# Patient Record
Sex: MALE | Race: ASIAN | NOT HISPANIC OR LATINO | Employment: OTHER | URBAN - METROPOLITAN AREA
[De-identification: names, ages, dates, MRNs, and addresses within clinical notes are randomized per-mention and may not be internally consistent; named-entity substitution may affect disease eponyms.]

---

## 2019-01-03 ENCOUNTER — OFFICE VISIT - HEALTHEAST (OUTPATIENT)
Dept: CARDIOLOGY | Facility: CLINIC | Age: 64
End: 2019-01-03

## 2019-01-03 ENCOUNTER — AMBULATORY - HEALTHEAST (OUTPATIENT)
Dept: CARDIOLOGY | Facility: CLINIC | Age: 64
End: 2019-01-03

## 2019-01-03 ENCOUNTER — SURGERY - HEALTHEAST (OUTPATIENT)
Dept: CARDIOLOGY | Facility: CLINIC | Age: 64
End: 2019-01-03

## 2019-01-03 DIAGNOSIS — I20.9 ANGINA, CLASS III (H): ICD-10-CM

## 2019-01-03 DIAGNOSIS — I10 HTN (HYPERTENSION): ICD-10-CM

## 2019-01-03 ASSESSMENT — MIFFLIN-ST. JEOR: SCORE: 1273.88

## 2019-01-10 ENCOUNTER — HOSPITAL ENCOUNTER (INPATIENT)
Dept: CARDIOLOGY | Facility: CLINIC | Age: 64
Discharge: HOME | End: 2019-01-19
Attending: INTERNAL MEDICINE | Admitting: INTERNAL MEDICINE

## 2019-01-10 ENCOUNTER — ANESTHESIA - HEALTHEAST (OUTPATIENT)
Dept: SURGERY | Facility: CLINIC | Age: 64
End: 2019-01-10

## 2019-01-10 ENCOUNTER — SURGERY - HEALTHEAST (OUTPATIENT)
Dept: CARDIOLOGY | Facility: CLINIC | Age: 64
End: 2019-01-10

## 2019-01-10 DIAGNOSIS — I25.10 CORONARY ARTERY DISEASE INVOLVING NATIVE CORONARY ARTERY OF NATIVE HEART, ANGINA PRESENCE UNSPECIFIED: ICD-10-CM

## 2019-01-10 DIAGNOSIS — Z95.1 S/P CABG (CORONARY ARTERY BYPASS GRAFT): ICD-10-CM

## 2019-01-10 DIAGNOSIS — M10.9 ACUTE GOUT OF RIGHT ANKLE, UNSPECIFIED CAUSE: ICD-10-CM

## 2019-01-10 DIAGNOSIS — M10.9 ACUTE GOUT, UNSPECIFIED CAUSE, UNSPECIFIED SITE: ICD-10-CM

## 2019-01-10 DIAGNOSIS — I20.9 ANGINA, CLASS III (H): ICD-10-CM

## 2019-01-10 LAB
ABO/RH(D): NORMAL
ALBUMIN UR-MCNC: ABNORMAL MG/DL
ANION GAP SERPL CALCULATED.3IONS-SCNC: 7 MMOL/L (ref 5–18)
ANTIBODY SCREEN: NEGATIVE
AORTIC ROOT: 3.4 CM
AORTIC VALVE MEAN VELOCITY: 102 CM/S
APPEARANCE UR: CLEAR
ASCENDING AORTA: 3.8 CM
ATRIAL RATE - MUSE: 110 BPM
AV DIMENSIONLESS INDEX VTI: 0.8
AV MEAN GRADIENT: 5 MMHG
AV PEAK GRADIENT: 9 MMHG
AV VALVE AREA: 2.9 CM2
AV VELOCITY RATIO: 0.7
BACTERIA #/AREA URNS HPF: ABNORMAL HPF
BASOPHILS # BLD AUTO: 0.1 THOU/UL (ref 0–0.2)
BASOPHILS NFR BLD AUTO: 1 % (ref 0–2)
BILIRUB UR QL STRIP: NEGATIVE
BSA FOR ECHO PROCEDURE: 1.67 M2
BUN SERPL-MCNC: 16 MG/DL (ref 8–22)
CALCIUM SERPL-MCNC: 9.6 MG/DL (ref 8.5–10.5)
CHLORIDE BLD-SCNC: 108 MMOL/L (ref 98–107)
CHOLEST SERPL-MCNC: 105 MG/DL
CO2 SERPL-SCNC: 24 MMOL/L (ref 22–31)
COLOR UR AUTO: YELLOW
CREAT SERPL-MCNC: 0.77 MG/DL (ref 0.7–1.3)
CV ECHO HEIGHT: 61 IN
CV ECHO WEIGHT: 143 LBS
DIASTOLIC BLOOD PRESSURE - MUSE: NORMAL MMHG
DOP CALC AO PEAK VEL: 150 CM/S
DOP CALC AO VTI: 32.6 CM
DOP CALC LVOT AREA: 3.8 CM2
DOP CALC LVOT DIAMETER: 2.2 CM
DOP CALC LVOT PEAK VEL: 110 CM/S
DOP CALC LVOT STROKE VOLUME: 93.1 CM3
DOP CALCLVOT PEAK VEL VTI: 24.5 CM
EJECTION FRACTION: 63 % (ref 55–75)
EOSINOPHIL # BLD AUTO: 0.2 THOU/UL (ref 0–0.4)
EOSINOPHIL NFR BLD AUTO: 2 % (ref 0–6)
ERYTHROCYTE [DISTWIDTH] IN BLOOD BY AUTOMATED COUNT: 12.3 % (ref 11–14.5)
FRACTIONAL SHORTENING: 27.1 % (ref 28–44)
GFR SERPL CREATININE-BSD FRML MDRD: >60 ML/MIN/1.73M2
GLUCOSE BLD-MCNC: 103 MG/DL (ref 70–125)
GLUCOSE UR STRIP-MCNC: NEGATIVE MG/DL
HCT VFR BLD AUTO: 41.5 % (ref 40–54)
HDLC SERPL-MCNC: 38 MG/DL
HGB BLD-MCNC: 14.5 G/DL (ref 14–18)
HGB UR QL STRIP: NEGATIVE
INR PPP: 1.08 (ref 0.9–1.1)
INTERPRETATION ECG - MUSE: NORMAL
INTERVENTRICULAR SEPTUM IN END DIASTOLE: 0.9 CM (ref 0.6–1)
IVS/PW RATIO: 0.8
KETONES UR STRIP-MCNC: ABNORMAL MG/DL
LA AREA 1: 22 CM2
LA AREA 2: 22.4 CM2
LDLC SERPL CALC-MCNC: 53 MG/DL
LEFT ATRIUM LENGTH: 6.23 CM
LEFT ATRIUM SIZE: 3.7 CM
LEFT ATRIUM VOLUME INDEX: 40.3 ML/M2
LEFT ATRIUM VOLUME: 67.2 ML
LEFT VENTRICLE CARDIAC INDEX: 3.1 L/MIN/M2
LEFT VENTRICLE CARDIAC OUTPUT: 5.2 L/MIN
LEFT VENTRICLE DIASTOLIC VOLUME INDEX: 38.3 CM3/M2 (ref 34–74)
LEFT VENTRICLE DIASTOLIC VOLUME: 64 CM3 (ref 62–150)
LEFT VENTRICLE HEART RATE: 56 BPM
LEFT VENTRICLE MASS INDEX: 108.9 G/M2
LEFT VENTRICLE SYSTOLIC VOLUME INDEX: 14.4 CM3/M2 (ref 11–31)
LEFT VENTRICLE SYSTOLIC VOLUME: 24 CM3 (ref 21–61)
LEFT VENTRICULAR INTERNAL DIMENSION IN DIASTOLE: 4.8 CM (ref 4.2–5.8)
LEFT VENTRICULAR INTERNAL DIMENSION IN SYSTOLE: 3.5 CM (ref 2.5–4)
LEFT VENTRICULAR MASS: 181.9 G
LEFT VENTRICULAR OUTFLOW TRACT MEAN GRADIENT: 3 MMHG
LEFT VENTRICULAR OUTFLOW TRACT MEAN VELOCITY: 77.4 CM/S
LEFT VENTRICULAR OUTFLOW TRACT PEAK GRADIENT: 5 MMHG
LEFT VENTRICULAR POSTERIOR WALL IN END DIASTOLE: 1.2 CM (ref 0.6–1)
LEUKOCYTE ESTERASE UR QL STRIP: NEGATIVE
LV STROKE VOLUME INDEX: 55.7 ML/M2
LYMPHOCYTES # BLD AUTO: 2.5 THOU/UL (ref 0.8–4.4)
LYMPHOCYTES NFR BLD AUTO: 22 % (ref 20–40)
MAGNESIUM SERPL-MCNC: 2.3 MG/DL (ref 1.8–2.6)
MAGNESIUM SERPL-MCNC: 2.4 MG/DL (ref 1.8–2.6)
MCH RBC QN AUTO: 31.5 PG (ref 27–34)
MCHC RBC AUTO-ENTMCNC: 34.9 G/DL (ref 32–36)
MCV RBC AUTO: 90 FL (ref 80–100)
MITRAL REGURGITANT VELOCITY TIME INTEGRAL: 188 CM
MITRAL VALVE E/A RATIO: 0.9
MONOCYTES # BLD AUTO: 1 THOU/UL (ref 0–0.9)
MONOCYTES NFR BLD AUTO: 9 % (ref 2–10)
MR FLOW: 4 CM3
MR MEAN GRADIENT: 57 MMHG
MR MEAN VELOCITY: 359 CM/S
MR PEAK GRADIENT: 85 MMHG
MR PISA EROA: 0 CM2
MR PISA RADIUS: 0.2 CM
MR PISA VN NYQUIST: 30.8 CM/S
MUCOUS THREADS #/AREA URNS LPF: ABNORMAL LPF
MV AVERAGE E/E' RATIO: 9.5 CM/S
MV DECELERATION TIME: 278 MS
MV E'TISSUE VEL-LAT: 8.97 CM/S
MV E'TISSUE VEL-MED: 5.75 CM/S
MV LATERAL E/E' RATIO: 7.8
MV MEDIAL E/E' RATIO: 12.1
MV PEAK A VELOCITY: 75 CM/S
MV PEAK E VELOCITY: 69.6 CM/S
MV REGURGITANT VOLUME: 3.2 CC
NEUTROPHILS # BLD AUTO: 7.3 THOU/UL (ref 2–7.7)
NEUTROPHILS NFR BLD AUTO: 66 % (ref 50–70)
NITRATE UR QL: NEGATIVE
NUC REST DIASTOLIC VOLUME INDEX: 2288 LBS
NUC REST SYSTOLIC VOLUME INDEX: 61 IN
P AXIS - MUSE: 43 DEGREES
PH UR STRIP: 6 [PH] (ref 4.5–8)
PISA MR PEAK VEL: 461 CM/S
PLATELET # BLD AUTO: 368 THOU/UL (ref 140–440)
PMV BLD AUTO: 9.1 FL (ref 8.5–12.5)
POTASSIUM BLD-SCNC: 4 MMOL/L (ref 3.5–5)
PR INTERVAL - MUSE: 210 MS
PR MAX PG: 3 MMHG
PR PEAK VELOCITY: 87.5 CM/S
PREALB SERPL-MCNC: 20.5 MG/DL (ref 19–38)
QRS DURATION - MUSE: 152 MS
QT - MUSE: 464 MS
QTC - MUSE: 447 MS
R AXIS - MUSE: 29 DEGREES
RBC # BLD AUTO: 4.61 MILL/UL (ref 4.4–6.2)
RBC #/AREA URNS AUTO: ABNORMAL HPF
SODIUM SERPL-SCNC: 139 MMOL/L (ref 136–145)
SP GR UR STRIP: >1.05 (ref 1–1.03)
SQUAMOUS #/AREA URNS AUTO: ABNORMAL LPF
SYSTOLIC BLOOD PRESSURE - MUSE: NORMAL MMHG
T AXIS - MUSE: 2 DEGREES
TRICUSPID REGURGITATION PEAK PRESSURE GRADIENT: 28.1 MMHG
TRICUSPID VALVE PEAK REGURGITANT VELOCITY: 265 CM/S
TRIGL SERPL-MCNC: 71 MG/DL
UROBILINOGEN UR STRIP-ACNC: ABNORMAL
VENTRICULAR RATE- MUSE: 56 BPM
WBC #/AREA URNS AUTO: ABNORMAL HPF
WBC: 11.1 THOU/UL (ref 4–11)

## 2019-01-10 ASSESSMENT — MIFFLIN-ST. JEOR
SCORE: 1292.02

## 2019-01-11 ENCOUNTER — SURGERY - HEALTHEAST (OUTPATIENT)
Dept: SURGERY | Facility: CLINIC | Age: 64
End: 2019-01-11

## 2019-01-11 LAB
ANION GAP SERPL CALCULATED.3IONS-SCNC: 4 MMOL/L (ref 5–18)
ANION GAP SERPL CALCULATED.3IONS-SCNC: 7 MMOL/L (ref 5–18)
APTT PPP: 71 SECONDS (ref 24–37)
BACTERIA SPEC CULT: NORMAL
BASE EXCESS ARTERIAL, I-STAT - HISTORICAL: -1 MMOL/L
BASE EXCESS ARTERIAL, I-STAT - HISTORICAL: -2 MMOL/L
BASE EXCESS ARTERIAL, I-STAT - HISTORICAL: 3 MMOL/L
BASE EXCESS BLDA CALC-SCNC: -0.4 MMOL/L
BASE EXCESS BLDA CALC-SCNC: -3 MMOL/L
BASE EXCESS BLDA CALC-SCNC: 0.4 MMOL/L
BASOPHILS # BLD AUTO: 0 THOU/UL (ref 0–0.2)
BASOPHILS NFR BLD AUTO: 0 % (ref 0–2)
BUN SERPL-MCNC: 12 MG/DL (ref 8–22)
BUN SERPL-MCNC: 14 MG/DL (ref 8–22)
CA-I BLD-MCNC: 0.95 MMOL/L (ref 1.11–1.3)
CA-I BLD-MCNC: 1.03 MMOL/L (ref 1.11–1.3)
CA-I BLD-MCNC: 1.11 MMOL/L (ref 1.11–1.3)
CA-I BLD-MCNC: 1.16 MMOL/L (ref 1.11–1.3)
CA-I BLD-MCNC: 1.23 MMOL/L (ref 1.11–1.3)
CALCIUM SERPL-MCNC: 7.5 MG/DL (ref 8.5–10.5)
CALCIUM SERPL-MCNC: 9.4 MG/DL (ref 8.5–10.5)
CALCIUM, IONIZED MEASURED: 1.02 MMOL/L (ref 1.11–1.3)
CHLORIDE BLD-SCNC: 104 MMOL/L (ref 98–107)
CHLORIDE BLD-SCNC: 115 MMOL/L (ref 98–107)
CO2 BLD-SCNC: 25 MMOL/L
CO2 BLD-SCNC: 25 MMOL/L
CO2 BLD-SCNC: 27 MMOL/L
CO2 BLD-SCNC: 28 MMOL/L
CO2 BLD-SCNC: 29 MMOL/L
CO2 SERPL-SCNC: 22 MMOL/L (ref 22–31)
CO2 SERPL-SCNC: 25 MMOL/L (ref 22–31)
COHGB MFR BLD: 100 % (ref 96–97)
COHGB MFR BLD: 100 % (ref 96–97)
COHGB MFR BLD: 99.8 % (ref 96–97)
CREAT SERPL-MCNC: 0.65 MG/DL (ref 0.7–1.3)
CREAT SERPL-MCNC: 0.76 MG/DL (ref 0.7–1.3)
EOSINOPHIL # BLD AUTO: 0.1 THOU/UL (ref 0–0.4)
EOSINOPHIL NFR BLD AUTO: 1 % (ref 0–6)
ERYTHROCYTE [DISTWIDTH] IN BLOOD BY AUTOMATED COUNT: 12.1 % (ref 11–14.5)
ERYTHROCYTE [DISTWIDTH] IN BLOOD BY AUTOMATED COUNT: 12.2 % (ref 11–14.5)
FIBRINOGEN PPP-MCNC: 316 MG/DL (ref 170–410)
FLOW: 3 LPM
GFR SERPL CREATININE-BSD FRML MDRD: >60 ML/MIN/1.73M2
GFR SERPL CREATININE-BSD FRML MDRD: >60 ML/MIN/1.73M2
GLUCOSE BLD-MCNC: 106 MG/DL (ref 70–125)
GLUCOSE BLD-MCNC: 171 MG/DL (ref 70–125)
GLUCOSE BLDC GLUCOMTR-MCNC: 113 MG/DL
GLUCOSE BLDC GLUCOMTR-MCNC: 114 MG/DL
GLUCOSE BLDC GLUCOMTR-MCNC: 122 MG/DL
GLUCOSE BLDC GLUCOMTR-MCNC: 124 MG/DL
GLUCOSE BLDC GLUCOMTR-MCNC: 126 MG/DL
GLUCOSE BLDC GLUCOMTR-MCNC: 126 MG/DL
GLUCOSE BLDC GLUCOMTR-MCNC: 133 MG/DL
GLUCOSE BLDC GLUCOMTR-MCNC: 143 MG/DL
GLUCOSE BLDC GLUCOMTR-MCNC: 143 MG/DL
GLUCOSE BLDC GLUCOMTR-MCNC: 159 MG/DL
GLUCOSE BLDC GLUCOMTR-MCNC: 178 MG/DL
GLUCOSE BLDC GLUCOMTR-MCNC: 97 MG/DL
HBA1C MFR BLD: 5.8 % (ref 4.2–6.1)
HCO3 BLDA-SCNC: 23.9 MMOL/L
HCO3 BLDA-SCNC: 23.9 MMOL/L
HCO3 BLDA-SCNC: 25.7 MMOL/L
HCO3 BLDA-SCNC: 25.8 MMOL/L
HCO3 BLDA-SCNC: 27.4 MMOL/L
HCO3, ARTERIAL CALC - HISTORICAL: 22.6 MMOL/L (ref 23–29)
HCO3, ARTERIAL CALC - HISTORICAL: 24.6 MMOL/L (ref 23–29)
HCO3, ARTERIAL CALC - HISTORICAL: 25.2 MMOL/L (ref 23–29)
HCT VFR BLD AUTO: 29.7 % (ref 40–54)
HCT VFR BLD AUTO: 40.2 % (ref 40–54)
HCT VFR BLD CALC: 24 %PCV
HCT VFR BLD CALC: 28 %PCV
HCT VFR BLD CALC: 30 %PCV
HCT VFR BLD CALC: 34 %PCV
HCT VFR BLD CALC: 37 %PCV
HGB BLD-MCNC: 10.2 G/DL
HGB BLD-MCNC: 10.2 G/DL (ref 14–18)
HGB BLD-MCNC: 11.6 G/DL
HGB BLD-MCNC: 12.6 G/DL
HGB BLD-MCNC: 14 G/DL (ref 14–18)
HGB BLD-MCNC: 8.2 G/DL
HGB BLD-MCNC: 9.5 G/DL
INR PPP: 1.09 (ref 0.9–1.1)
INR PPP: 1.48 (ref 0.9–1.1)
INR PPP: 1.54 (ref 0.9–1.1)
ION CA PH 7.4: 1.01 MMOL/L (ref 1.11–1.3)
LYMPHOCYTES # BLD AUTO: 3 THOU/UL (ref 0.8–4.4)
LYMPHOCYTES NFR BLD AUTO: 19 % (ref 20–40)
MAGNESIUM SERPL-MCNC: 2.2 MG/DL (ref 1.8–2.6)
MAGNESIUM SERPL-MCNC: 2.5 MG/DL (ref 1.8–2.6)
MCH RBC QN AUTO: 31.2 PG (ref 27–34)
MCH RBC QN AUTO: 31.2 PG (ref 27–34)
MCHC RBC AUTO-ENTMCNC: 34.3 G/DL (ref 32–36)
MCHC RBC AUTO-ENTMCNC: 34.8 G/DL (ref 32–36)
MCV RBC AUTO: 90 FL (ref 80–100)
MCV RBC AUTO: 91 FL (ref 80–100)
MONOCYTES # BLD AUTO: 0.6 THOU/UL (ref 0–0.9)
MONOCYTES NFR BLD AUTO: 4 % (ref 2–10)
NEUTROPHILS # BLD AUTO: 11.7 THOU/UL (ref 2–7.7)
NEUTROPHILS NFR BLD AUTO: 76 % (ref 50–70)
O2/TOTAL GAS SETTING VFR VENT: 0.4 %
O2/TOTAL GAS SETTING VFR VENT: 1 %
OXYHEMOGLOBIN - HISTORICAL: 96.2 % (ref 96–97)
OXYHEMOGLOBIN - HISTORICAL: 97 % (ref 96–97)
OXYHEMOGLOBIN - HISTORICAL: 97.3 % (ref 96–97)
PCO2 BLD: 38 MM HG (ref 35–45)
PCO2 BLD: 42 MM HG (ref 35–45)
PCO2 BLD: 45 MM HG (ref 35–45)
PCO2 BLDA: 42.3 MMHG
PCO2 BLDA: 43.6 MMHG
PCO2 BLDA: 47.4 MMHG
PCO2 BLDA: 54.8 MMHG
PCO2 BLDA: 62.3 MMHG
PEEP: 5 CM H2O
PEEP: 5 CM H2O
PH BLD: 7.37 [PH] (ref 7.37–7.44)
PH BLD: 7.37 [PH] (ref 7.37–7.44)
PH BLD: 7.38 [PH] (ref 7.37–7.44)
PH: 7.38 (ref 7.35–7.45)
PLATELET # BLD AUTO: 189 THOU/UL (ref 140–440)
PLATELET # BLD AUTO: 372 THOU/UL (ref 140–440)
PMV BLD AUTO: 8.9 FL (ref 8.5–12.5)
PMV BLD AUTO: 9.1 FL (ref 8.5–12.5)
PO2 BLD: 103 MM HG (ref 80–90)
PO2 BLD: 121 MM HG (ref 80–90)
PO2 BLD: 410 MM HG (ref 80–90)
PO2 BLDA: 191 MMHG
PO2 BLDA: 276 MMHG
PO2 BLDA: 361 MMHG
PO2 BLDA: 449 MMHG
PO2 BLDA: 61 MMHG
POC PH - HISTORICAL: 7.23
POC PH - HISTORICAL: 7.28
POC PH - HISTORICAL: 7.31
POC PH - HISTORICAL: 7.36
POC PH - HISTORICAL: 7.41
POTASSIUM BLD-SCNC: 3.4 MMOL/L (ref 3.5–5)
POTASSIUM BLD-SCNC: 3.7 MMOL/L (ref 3.5–5)
POTASSIUM BLD-SCNC: 4.2 MMOL/L (ref 3.5–5)
POTASSIUM BLD-SCNC: 4.5 MMOL/L (ref 3.5–5)
POTASSIUM BLD-SCNC: 4.6 MMOL/L (ref 3.5–5)
POTASSIUM BLD-SCNC: 5 MMOL/L (ref 3.5–5)
POTASSIUM BLD-SCNC: 5.4 MMOL/L (ref 3.5–5)
PSV (LAB BLOOD GAS): 5 CM H2O
RATE: 16 RR/MIN
RBC # BLD AUTO: 3.27 MILL/UL (ref 4.4–6.2)
RBC # BLD AUTO: 4.49 MILL/UL (ref 4.4–6.2)
SAT POCT - HISTORICAL: 100 %
SAT POCT - HISTORICAL: 85 %
SODIUM BLD-SCNC: 139 MMOL/L (ref 136–145)
SODIUM BLD-SCNC: 140 MMOL/L (ref 136–145)
SODIUM BLD-SCNC: 140 MMOL/L (ref 136–145)
SODIUM BLD-SCNC: 141 MMOL/L (ref 136–145)
SODIUM BLD-SCNC: 143 MMOL/L (ref 136–145)
SODIUM SERPL-SCNC: 136 MMOL/L (ref 136–145)
SODIUM SERPL-SCNC: 141 MMOL/L (ref 136–145)
TEMPERATURE: 37 DEGREES C
VENTILATION MODE: ABNORMAL
VENTILATOR TIDAL VOLUME: 400 ML
WBC: 13.5 THOU/UL (ref 4–11)
WBC: 15.7 THOU/UL (ref 4–11)

## 2019-01-11 ASSESSMENT — MIFFLIN-ST. JEOR
SCORE: 1285.67

## 2019-01-12 LAB
ANION GAP SERPL CALCULATED.3IONS-SCNC: 7 MMOL/L (ref 5–18)
ATRIAL RATE - MUSE: 89 BPM
BASOPHILS # BLD AUTO: 0 THOU/UL (ref 0–0.2)
BASOPHILS NFR BLD AUTO: 0 % (ref 0–2)
BUN SERPL-MCNC: 14 MG/DL (ref 8–22)
CALCIUM SERPL-MCNC: 8.1 MG/DL (ref 8.5–10.5)
CALCIUM, IONIZED MEASURED: 1.17 MMOL/L (ref 1.11–1.3)
CHLORIDE BLD-SCNC: 115 MMOL/L (ref 98–107)
CO2 SERPL-SCNC: 22 MMOL/L (ref 22–31)
COMPONENT (HISTORICAL CONVERSION): NORMAL
CREAT SERPL-MCNC: 0.76 MG/DL (ref 0.7–1.3)
DIASTOLIC BLOOD PRESSURE - MUSE: NORMAL MMHG
EOSINOPHIL # BLD AUTO: 0 THOU/UL (ref 0–0.4)
EOSINOPHIL NFR BLD AUTO: 0 % (ref 0–6)
ERYTHROCYTE [DISTWIDTH] IN BLOOD BY AUTOMATED COUNT: 12.6 % (ref 11–14.5)
GFR SERPL CREATININE-BSD FRML MDRD: >60 ML/MIN/1.73M2
GLUCOSE BLD-MCNC: 114 MG/DL (ref 70–125)
GLUCOSE BLDC GLUCOMTR-MCNC: 128 MG/DL
GLUCOSE BLDC GLUCOMTR-MCNC: 130 MG/DL
GLUCOSE BLDC GLUCOMTR-MCNC: 133 MG/DL
GLUCOSE BLDC GLUCOMTR-MCNC: 134 MG/DL
GLUCOSE BLDC GLUCOMTR-MCNC: 137 MG/DL
GLUCOSE BLDC GLUCOMTR-MCNC: 138 MG/DL
GLUCOSE BLDC GLUCOMTR-MCNC: 147 MG/DL
GLUCOSE BLDC GLUCOMTR-MCNC: 182 MG/DL
GLUCOSE BLDC GLUCOMTR-MCNC: 22 MG/DL
HCT VFR BLD AUTO: 24.6 % (ref 40–54)
HGB BLD-MCNC: 8.1 G/DL (ref 14–18)
INR PPP: 1.35 (ref 0.9–1.1)
INTERPRETATION ECG - MUSE: NORMAL
ION CA PH 7.4: 1.1 MMOL/L (ref 1.11–1.3)
LYMPHOCYTES # BLD AUTO: 1.3 THOU/UL (ref 0.8–4.4)
LYMPHOCYTES NFR BLD AUTO: 12 % (ref 20–40)
MAGNESIUM SERPL-MCNC: 2.1 MG/DL (ref 1.8–2.6)
MCH RBC QN AUTO: 31.5 PG (ref 27–34)
MCHC RBC AUTO-ENTMCNC: 32.9 G/DL (ref 32–36)
MCV RBC AUTO: 96 FL (ref 80–100)
MONOCYTES # BLD AUTO: 1.1 THOU/UL (ref 0–0.9)
MONOCYTES NFR BLD AUTO: 10 % (ref 2–10)
NEUTROPHILS # BLD AUTO: 8.6 THOU/UL (ref 2–7.7)
NEUTROPHILS NFR BLD AUTO: 78 % (ref 50–70)
P AXIS - MUSE: 36 DEGREES
PH: 7.25 (ref 7.35–7.45)
PLATELET # BLD AUTO: 156 THOU/UL (ref 140–440)
PMV BLD AUTO: 9.5 FL (ref 8.5–12.5)
POTASSIUM BLD-SCNC: 4.1 MMOL/L (ref 3.5–5)
POTASSIUM BLD-SCNC: 4.5 MMOL/L (ref 3.5–5)
PR INTERVAL - MUSE: 222 MS
QRS DURATION - MUSE: 138 MS
QT - MUSE: 400 MS
QTC - MUSE: 486 MS
R AXIS - MUSE: 60 DEGREES
RBC # BLD AUTO: 2.57 MILL/UL (ref 4.4–6.2)
SODIUM SERPL-SCNC: 144 MMOL/L (ref 136–145)
STATUS (HISTORICAL CONVERSION): NORMAL
SYSTOLIC BLOOD PRESSURE - MUSE: NORMAL MMHG
T AXIS - MUSE: -3 DEGREES
VENTRICULAR RATE- MUSE: 89 BPM
WBC: 11 THOU/UL (ref 4–11)

## 2019-01-12 ASSESSMENT — MIFFLIN-ST. JEOR
SCORE: 1330.13

## 2019-01-13 LAB
ANION GAP SERPL CALCULATED.3IONS-SCNC: 10 MMOL/L (ref 5–18)
ATRIAL RATE - MUSE: 78 BPM
BLD PROD TYP BPU: NORMAL
BLOOD EXPIRATION DATE: NORMAL
BLOOD TYPE: 9500
BUN SERPL-MCNC: 21 MG/DL (ref 8–22)
CALCIUM SERPL-MCNC: 8.7 MG/DL (ref 8.5–10.5)
CALCIUM, IONIZED MEASURED: 1.16 MMOL/L (ref 1.11–1.3)
CHLORIDE BLD-SCNC: 106 MMOL/L (ref 98–107)
CO2 SERPL-SCNC: 24 MMOL/L (ref 22–31)
CODING SYSTEM: NORMAL
COMPONENT (HISTORICAL CONVERSION): NORMAL
CREAT SERPL-MCNC: 0.77 MG/DL (ref 0.7–1.3)
DIASTOLIC BLOOD PRESSURE - MUSE: NORMAL MMHG
GFR SERPL CREATININE-BSD FRML MDRD: >60 ML/MIN/1.73M2
GLUCOSE BLD-MCNC: 124 MG/DL (ref 70–125)
GLUCOSE BLDC GLUCOMTR-MCNC: 147 MG/DL
GLUCOSE BLDC GLUCOMTR-MCNC: 206 MG/DL
GLUCOSE BLDC GLUCOMTR-MCNC: 213 MG/DL
GLUCOSE BLDC GLUCOMTR-MCNC: 231 MG/DL
HGB BLD-MCNC: 8.4 G/DL (ref 14–18)
INTERPRETATION ECG - MUSE: NORMAL
ION CA PH 7.4: 1.16 MMOL/L (ref 1.11–1.3)
ISSUE DATE AND TIME: NORMAL
MAGNESIUM SERPL-MCNC: 2.3 MG/DL (ref 1.8–2.6)
P AXIS - MUSE: 42 DEGREES
PH: 7.4 (ref 7.35–7.45)
PLATELET # BLD AUTO: 176 THOU/UL (ref 140–440)
POTASSIUM BLD-SCNC: 3.9 MMOL/L (ref 3.5–5)
PR INTERVAL - MUSE: 200 MS
QRS DURATION - MUSE: 150 MS
QT - MUSE: 426 MS
QTC - MUSE: 485 MS
R AXIS - MUSE: 10 DEGREES
SODIUM SERPL-SCNC: 140 MMOL/L (ref 136–145)
STATUS (HISTORICAL CONVERSION): NORMAL
SYSTOLIC BLOOD PRESSURE - MUSE: NORMAL MMHG
T AXIS - MUSE: -3 DEGREES
UNIT ABO/RH (HISTORICAL CONVERSION): NORMAL
UNIT NUMBER: NORMAL
VENTRICULAR RATE- MUSE: 78 BPM

## 2019-01-13 ASSESSMENT — MIFFLIN-ST. JEOR
SCORE: 1310.62

## 2019-01-14 LAB
ANION GAP SERPL CALCULATED.3IONS-SCNC: 7 MMOL/L (ref 5–18)
ATRIAL RATE - MUSE: 72 BPM
BLD PROD TYP BPU: NORMAL
BLD PROD TYP BPU: NORMAL
BLOOD EXPIRATION DATE: NORMAL
BLOOD EXPIRATION DATE: NORMAL
BLOOD TYPE: 5100
BLOOD TYPE: 5100
BUN SERPL-MCNC: 17 MG/DL (ref 8–22)
CALCIUM SERPL-MCNC: 8.4 MG/DL (ref 8.5–10.5)
CHLORIDE BLD-SCNC: 99 MMOL/L (ref 98–107)
CO2 SERPL-SCNC: 28 MMOL/L (ref 22–31)
CODING SYSTEM: NORMAL
CODING SYSTEM: NORMAL
COMPONENT (HISTORICAL CONVERSION): NORMAL
COMPONENT (HISTORICAL CONVERSION): NORMAL
CREAT SERPL-MCNC: 0.74 MG/DL (ref 0.7–1.3)
CROSSMATCH: NORMAL
CROSSMATCH: NORMAL
DIASTOLIC BLOOD PRESSURE - MUSE: NORMAL MMHG
ERYTHROCYTE [DISTWIDTH] IN BLOOD BY AUTOMATED COUNT: 12.4 % (ref 11–14.5)
GFR SERPL CREATININE-BSD FRML MDRD: >60 ML/MIN/1.73M2
GLUCOSE BLD-MCNC: 183 MG/DL (ref 70–125)
GLUCOSE BLDC GLUCOMTR-MCNC: 111 MG/DL
GLUCOSE BLDC GLUCOMTR-MCNC: 191 MG/DL
GLUCOSE BLDC GLUCOMTR-MCNC: 192 MG/DL
GLUCOSE BLDC GLUCOMTR-MCNC: 234 MG/DL
GLUCOSE BLDC GLUCOMTR-MCNC: 255 MG/DL
HCT VFR BLD AUTO: 26.2 % (ref 40–54)
HGB BLD-MCNC: 8.9 G/DL (ref 14–18)
INTERPRETATION ECG - MUSE: NORMAL
MAGNESIUM SERPL-MCNC: 2.1 MG/DL (ref 1.8–2.6)
MCH RBC QN AUTO: 31.3 PG (ref 27–34)
MCHC RBC AUTO-ENTMCNC: 34 G/DL (ref 32–36)
MCV RBC AUTO: 92 FL (ref 80–100)
P AXIS - MUSE: 3 DEGREES
PLATELET # BLD AUTO: 221 THOU/UL (ref 140–440)
PMV BLD AUTO: 9.6 FL (ref 8.5–12.5)
POTASSIUM BLD-SCNC: 3.3 MMOL/L (ref 3.5–5)
POTASSIUM BLD-SCNC: 4.2 MMOL/L (ref 3.5–5)
PR INTERVAL - MUSE: 154 MS
QRS DURATION - MUSE: 146 MS
QT - MUSE: 504 MS
QTC - MUSE: 494 MS
R AXIS - MUSE: 6 DEGREES
RBC # BLD AUTO: 2.84 MILL/UL (ref 4.4–6.2)
SODIUM SERPL-SCNC: 134 MMOL/L (ref 136–145)
STATUS (HISTORICAL CONVERSION): NORMAL
STATUS (HISTORICAL CONVERSION): NORMAL
SYSTOLIC BLOOD PRESSURE - MUSE: NORMAL MMHG
T AXIS - MUSE: -1 DEGREES
UNIT ABO/RH (HISTORICAL CONVERSION): NORMAL
UNIT ABO/RH (HISTORICAL CONVERSION): NORMAL
UNIT NUMBER: NORMAL
UNIT NUMBER: NORMAL
VENTRICULAR RATE- MUSE: 58 BPM
WBC: 19.6 THOU/UL (ref 4–11)

## 2019-01-14 ASSESSMENT — MIFFLIN-ST. JEOR
SCORE: 1258.46

## 2019-01-15 LAB
ANION GAP SERPL CALCULATED.3IONS-SCNC: 6 MMOL/L (ref 5–18)
BASOPHILS # BLD AUTO: 0.1 THOU/UL (ref 0–0.2)
BASOPHILS NFR BLD AUTO: 1 % (ref 0–2)
BUN SERPL-MCNC: 16 MG/DL (ref 8–22)
CALCIUM SERPL-MCNC: 8.4 MG/DL (ref 8.5–10.5)
CHLORIDE BLD-SCNC: 101 MMOL/L (ref 98–107)
CO2 SERPL-SCNC: 29 MMOL/L (ref 22–31)
CREAT SERPL-MCNC: 0.73 MG/DL (ref 0.7–1.3)
EOSINOPHIL # BLD AUTO: 0.2 THOU/UL (ref 0–0.4)
EOSINOPHIL NFR BLD AUTO: 1 % (ref 0–6)
ERYTHROCYTE [DISTWIDTH] IN BLOOD BY AUTOMATED COUNT: 12.6 % (ref 11–14.5)
GFR SERPL CREATININE-BSD FRML MDRD: >60 ML/MIN/1.73M2
GLUCOSE BLD-MCNC: 108 MG/DL (ref 70–125)
GLUCOSE BLDC GLUCOMTR-MCNC: 164 MG/DL
GLUCOSE BLDC GLUCOMTR-MCNC: 164 MG/DL
GLUCOSE BLDC GLUCOMTR-MCNC: 174 MG/DL
GLUCOSE BLDC GLUCOMTR-MCNC: 187 MG/DL
GLUCOSE BLDC GLUCOMTR-MCNC: 198 MG/DL
HCT VFR BLD AUTO: 25.4 % (ref 40–54)
HGB BLD-MCNC: 8.3 G/DL (ref 14–18)
LYMPHOCYTES # BLD AUTO: 3.5 THOU/UL (ref 0.8–4.4)
LYMPHOCYTES NFR BLD AUTO: 21 % (ref 20–40)
MAGNESIUM SERPL-MCNC: 2.6 MG/DL (ref 1.8–2.6)
MCH RBC QN AUTO: 31 PG (ref 27–34)
MCHC RBC AUTO-ENTMCNC: 32.7 G/DL (ref 32–36)
MCV RBC AUTO: 95 FL (ref 80–100)
MONOCYTES # BLD AUTO: 1.7 THOU/UL (ref 0–0.9)
MONOCYTES NFR BLD AUTO: 10 % (ref 2–10)
NEUTROPHILS # BLD AUTO: 11.3 THOU/UL (ref 2–7.7)
NEUTROPHILS NFR BLD AUTO: 68 % (ref 50–70)
PLATELET # BLD AUTO: 252 THOU/UL (ref 140–440)
PMV BLD AUTO: 9.1 FL (ref 8.5–12.5)
POTASSIUM BLD-SCNC: 4.5 MMOL/L (ref 3.5–5)
RBC # BLD AUTO: 2.68 MILL/UL (ref 4.4–6.2)
SODIUM SERPL-SCNC: 136 MMOL/L (ref 136–145)
WBC: 17 THOU/UL (ref 4–11)

## 2019-01-15 ASSESSMENT — MIFFLIN-ST. JEOR
SCORE: 1306.99

## 2019-01-16 LAB
ERYTHROCYTE [DISTWIDTH] IN BLOOD BY AUTOMATED COUNT: 12.4 % (ref 11–14.5)
GLUCOSE BLDC GLUCOMTR-MCNC: 126 MG/DL
GLUCOSE BLDC GLUCOMTR-MCNC: 168 MG/DL
GLUCOSE BLDC GLUCOMTR-MCNC: 184 MG/DL
GLUCOSE BLDC GLUCOMTR-MCNC: 185 MG/DL
HCT VFR BLD AUTO: 24 % (ref 40–54)
HGB BLD-MCNC: 8 G/DL (ref 14–18)
MAGNESIUM SERPL-MCNC: 2.3 MG/DL (ref 1.8–2.6)
MCH RBC QN AUTO: 30.9 PG (ref 27–34)
MCHC RBC AUTO-ENTMCNC: 33.3 G/DL (ref 32–36)
MCV RBC AUTO: 93 FL (ref 80–100)
PLATELET # BLD AUTO: 260 THOU/UL (ref 140–440)
PMV BLD AUTO: 9.4 FL (ref 8.5–12.5)
POTASSIUM BLD-SCNC: 4.2 MMOL/L (ref 3.5–5)
RBC # BLD AUTO: 2.59 MILL/UL (ref 4.4–6.2)
WBC: 16.6 THOU/UL (ref 4–11)

## 2019-01-16 ASSESSMENT — MIFFLIN-ST. JEOR
SCORE: 1291.12

## 2019-01-17 LAB
GLUCOSE BLDC GLUCOMTR-MCNC: 149 MG/DL
GLUCOSE BLDC GLUCOMTR-MCNC: 153 MG/DL
GLUCOSE BLDC GLUCOMTR-MCNC: 158 MG/DL
GLUCOSE BLDC GLUCOMTR-MCNC: 194 MG/DL
PLATELET # BLD AUTO: 325 THOU/UL (ref 140–440)
POTASSIUM BLD-SCNC: 3.8 MMOL/L (ref 3.5–5)

## 2019-01-17 ASSESSMENT — MIFFLIN-ST. JEOR
SCORE: 1300.64

## 2019-01-18 ENCOUNTER — HOSPITAL ENCOUNTER (OUTPATIENT)
Dept: OCCUPATIONAL THERAPY | Facility: CLINIC | Age: 64
Discharge: HOME | End: 2019-01-18
Attending: INTERNAL MEDICINE | Admitting: INTERNAL MEDICINE

## 2019-01-18 LAB
ANION GAP SERPL CALCULATED.3IONS-SCNC: 8 MMOL/L (ref 5–18)
ATRIAL RATE - MUSE: 37 BPM
BUN SERPL-MCNC: 18 MG/DL (ref 8–22)
CALCIUM SERPL-MCNC: 9 MG/DL (ref 8.5–10.5)
CHLORIDE BLD-SCNC: 103 MMOL/L (ref 98–107)
CO2 SERPL-SCNC: 27 MMOL/L (ref 22–31)
CREAT SERPL-MCNC: 0.74 MG/DL (ref 0.7–1.3)
DIASTOLIC BLOOD PRESSURE - MUSE: NORMAL MMHG
ERYTHROCYTE [DISTWIDTH] IN BLOOD BY AUTOMATED COUNT: 13.1 % (ref 11–14.5)
GFR SERPL CREATININE-BSD FRML MDRD: >60 ML/MIN/1.73M2
GLUCOSE BLD-MCNC: 97 MG/DL (ref 70–125)
GLUCOSE BLDC GLUCOMTR-MCNC: 135 MG/DL
GLUCOSE BLDC GLUCOMTR-MCNC: 197 MG/DL
GLUCOSE BLDC GLUCOMTR-MCNC: 251 MG/DL
GLUCOSE BLDC GLUCOMTR-MCNC: 81 MG/DL
HCT VFR BLD AUTO: 27.4 % (ref 40–54)
HGB BLD-MCNC: 8.9 G/DL (ref 14–18)
INTERPRETATION ECG - MUSE: NORMAL
MCH RBC QN AUTO: 31.2 PG (ref 27–34)
MCHC RBC AUTO-ENTMCNC: 32.5 G/DL (ref 32–36)
MCV RBC AUTO: 96 FL (ref 80–100)
P AXIS - MUSE: -4 DEGREES
PLATELET # BLD AUTO: 407 THOU/UL (ref 140–440)
PMV BLD AUTO: 9.2 FL (ref 8.5–12.5)
POTASSIUM BLD-SCNC: 4.2 MMOL/L (ref 3.5–5)
PR INTERVAL - MUSE: 158 MS
QRS DURATION - MUSE: 132 MS
QT - MUSE: 532 MS
QTC - MUSE: 417 MS
R AXIS - MUSE: 8 DEGREES
RBC # BLD AUTO: 2.85 MILL/UL (ref 4.4–6.2)
SODIUM SERPL-SCNC: 138 MMOL/L (ref 136–145)
SYSTOLIC BLOOD PRESSURE - MUSE: NORMAL MMHG
T AXIS - MUSE: 52 DEGREES
VENTRICULAR RATE- MUSE: 37 BPM
WBC: 25.7 THOU/UL (ref 4–11)

## 2019-01-18 ASSESSMENT — MIFFLIN-ST. JEOR
SCORE: 1286.13

## 2019-01-19 LAB
ALBUMIN UR-MCNC: NEGATIVE MG/DL
APPEARANCE UR: CLEAR
BILIRUB UR QL STRIP: NEGATIVE
COLOR UR AUTO: COLORLESS
ERYTHROCYTE [DISTWIDTH] IN BLOOD BY AUTOMATED COUNT: 13.7 % (ref 11–14.5)
GLUCOSE BLDC GLUCOMTR-MCNC: 104 MG/DL
GLUCOSE UR STRIP-MCNC: NEGATIVE MG/DL
HCT VFR BLD AUTO: 29.2 % (ref 40–54)
HGB BLD-MCNC: 9.4 G/DL (ref 14–18)
HGB UR QL STRIP: NEGATIVE
KETONES UR STRIP-MCNC: NEGATIVE MG/DL
LEUKOCYTE ESTERASE UR QL STRIP: NEGATIVE
MCH RBC QN AUTO: 31 PG (ref 27–34)
MCHC RBC AUTO-ENTMCNC: 32.2 G/DL (ref 32–36)
MCV RBC AUTO: 96 FL (ref 80–100)
NITRATE UR QL: NEGATIVE
PH UR STRIP: 5.5 [PH] (ref 4.5–8)
PLATELET # BLD AUTO: 454 THOU/UL (ref 140–440)
PMV BLD AUTO: 9.2 FL (ref 8.5–12.5)
POTASSIUM BLD-SCNC: 4.4 MMOL/L (ref 3.5–5)
RBC # BLD AUTO: 3.03 MILL/UL (ref 4.4–6.2)
SP GR UR STRIP: 1 (ref 1–1.03)
UROBILINOGEN UR STRIP-ACNC: NORMAL
WBC: 29.4 THOU/UL (ref 4–11)

## 2019-01-19 ASSESSMENT — MIFFLIN-ST. JEOR
SCORE: 1273.88

## 2019-01-21 ENCOUNTER — TELEPHONE (OUTPATIENT)
Dept: FAMILY MEDICINE | Facility: CLINIC | Age: 64
End: 2019-01-21

## 2019-01-21 NOTE — TELEPHONE ENCOUNTER
Date of discharge: 01/18/19  Facility of discharge: [facility]  Patient concerns about condition: No concerns at this time.  Patient concerns about medications: Concerns include Pt is wondering if he can get something for the pain.  Full med reconciliation will be completed at clinic visit.  Patient concerns about transitioning: No concerns at this time.  Clinic office visit appointment date: 01/22/19 w/Dr. Butts at 4:30 pm  Patient reminded to bring all medications (prescription and over-the-counter) to clinic appointment: Yes    JOSIAH Gray    Routed to Dr. Butts for FYI. Thanks. JOSIAH Gray

## 2019-01-22 ENCOUNTER — OFFICE VISIT (OUTPATIENT)
Dept: FAMILY MEDICINE | Facility: CLINIC | Age: 64
End: 2019-01-22
Payer: COMMERCIAL

## 2019-01-22 ENCOUNTER — OFFICE VISIT (OUTPATIENT)
Dept: PHARMACY | Facility: CLINIC | Age: 64
End: 2019-01-22
Payer: COMMERCIAL

## 2019-01-22 VITALS
RESPIRATION RATE: 20 BRPM | SYSTOLIC BLOOD PRESSURE: 118 MMHG | HEART RATE: 94 BPM | WEIGHT: 139.4 LBS | OXYGEN SATURATION: 99 % | BODY MASS INDEX: 27.37 KG/M2 | DIASTOLIC BLOOD PRESSURE: 87 MMHG | TEMPERATURE: 97.4 F | HEIGHT: 60 IN

## 2019-01-22 DIAGNOSIS — Z09 HOSPITAL DISCHARGE FOLLOW-UP: Primary | ICD-10-CM

## 2019-01-22 DIAGNOSIS — I25.810 CORONARY ARTERY DISEASE INVOLVING AUTOLOGOUS ARTERY CORONARY BYPASS GRAFT WITHOUT ANGINA PECTORIS: Primary | ICD-10-CM

## 2019-01-22 LAB
% GRANULOCYTES: 73.7 %G (ref 40–75)
ANION GAP SERPL CALCULATED.3IONS-SCNC: 10 MMOL/L (ref 5–18)
BUN SERPL-MCNC: 14 MG/DL (ref 8–22)
CALCIUM SERPL-MCNC: 8.9 MG/DL (ref 8.5–10.5)
CHLORIDE SERPL-SCNC: 102 MMOL/L (ref 98–107)
CO2 SERPL-SCNC: 22 MMOL/L (ref 22–31)
CREAT SERPL-MCNC: 0.78 MG/DL (ref 0.7–1.3)
GLUCOSE SERPL-MCNC: 139 MG/DL (ref 70–125)
GRANULOCYTES #: 13.6 K/UL (ref 1.6–8.3)
HCT VFR BLD AUTO: 37.9 % (ref 40–53)
HEMOGLOBIN: 11.6 G/DL (ref 13.3–17.7)
LYMPHOCYTES # BLD AUTO: 3.3 K/UL (ref 0.8–5.3)
LYMPHOCYTES NFR BLD AUTO: 17.7 %L (ref 20–48)
MAGNESIUM SERPL-MCNC: 2.4 MG/DL (ref 1.8–2.6)
MCH RBC QN AUTO: 30.7 PG (ref 26.5–35)
MCHC RBC AUTO-ENTMCNC: 30.6 G/DL (ref 32–36)
MCV RBC AUTO: 100.2 FL (ref 78–100)
MID #: 1.6 K/UL (ref 0–2.2)
MID %: 8.6 %M (ref 0–20)
PLATELET # BLD AUTO: 567 K/UL (ref 150–450)
POTASSIUM SERPL-SCNC: 4.2 MMOL/L (ref 3.5–5)
RBC # BLD AUTO: 3.8 M/UL (ref 4.4–5.9)
SODIUM SERPL-SCNC: 134 MMOL/L (ref 136–145)
URATE SERPL-MCNC: 8.1 MG/DL (ref 3–8)
WBC # BLD AUTO: 18.4 K/UL (ref 4–11)

## 2019-01-22 RX ORDER — LIDOCAINE 4 G/G
1 PATCH TOPICAL DAILY
COMMUNITY
Start: 2019-01-18 | End: 2019-03-26

## 2019-01-22 RX ORDER — POTASSIUM CHLORIDE 1500 MG/1
20 TABLET, EXTENDED RELEASE ORAL 2 TIMES DAILY
COMMUNITY
Start: 2019-01-19 | End: 2019-01-23

## 2019-01-22 RX ORDER — ASPIRIN 81 MG/1
81 TABLET, CHEWABLE ORAL DAILY
COMMUNITY
Start: 2018-12-26 | End: 2019-03-20

## 2019-01-22 RX ORDER — COLCHICINE 0.6 MG/1
0.6 TABLET ORAL 2 TIMES DAILY
COMMUNITY
Start: 2019-01-17 | End: 2019-03-20

## 2019-01-22 RX ORDER — CLOPIDOGREL BISULFATE 75 MG/1
75 TABLET ORAL DAILY
COMMUNITY
Start: 2019-01-18 | End: 2019-03-20

## 2019-01-22 RX ORDER — FUROSEMIDE 20 MG
20 TABLET ORAL 2 TIMES DAILY
COMMUNITY
Start: 2019-01-19 | End: 2019-01-22

## 2019-01-22 RX ORDER — FUROSEMIDE 20 MG
20 TABLET ORAL 2 TIMES DAILY
Qty: 14 TABLET | Refills: 0 | COMMUNITY
Start: 2019-01-19 | End: 2019-01-23

## 2019-01-22 RX ORDER — FERROUS SULFATE 325(65) MG
325 TABLET ORAL 2 TIMES DAILY
COMMUNITY
Start: 2019-01-17

## 2019-01-22 RX ORDER — ACETAMINOPHEN 325 MG/1
650 TABLET ORAL EVERY 6 HOURS PRN
COMMUNITY
End: 2019-03-20

## 2019-01-22 RX ORDER — TRAMADOL HYDROCHLORIDE 50 MG/1
50 TABLET ORAL EVERY 8 HOURS PRN
COMMUNITY
Start: 2019-01-17

## 2019-01-22 RX ORDER — ATORVASTATIN CALCIUM 80 MG/1
80 TABLET, FILM COATED ORAL AT BEDTIME
COMMUNITY
Start: 2019-01-17 | End: 2019-03-20

## 2019-01-22 RX ORDER — METOPROLOL TARTRATE 25 MG/1
12.5 TABLET, FILM COATED ORAL 2 TIMES DAILY
COMMUNITY
Start: 2019-01-19 | End: 2019-03-20

## 2019-01-22 ASSESSMENT — MIFFLIN-ST. JEOR: SCORE: 1249.97

## 2019-01-22 NOTE — NURSING NOTE
Due to patient being non-English speaking/uses sign language, an  was used for this visit. Only for face-to-face interpretation by an external agency, date and length of interpretation can be found on the scanned worksheet.     name: Ashley Martins  Agency: [employer]  Language: Hmong   Telephone number: [phone]  Type of interpretation: Face-to-face, spoken

## 2019-01-22 NOTE — LETTER
January 25, 2019      Main Carney  [address] [place] [address]        Dear Main,  Here is a copy of Main's most recent labs. His electrolytes all look good. His white blood count is still a little high and his red blood cell (hemoglobin) count is a little low, but they are improving from when they were checked in the hospital and most likely are from his recent stress of surgery. If his cough isn't getting better or breathing gets worse, please come back and see us again. His uric acid (gout) level is also a little high - he should keep taking the colchicine and at next visit (make an appointment for beginning of March unless you think he needs to be seen sooner) we can talk about whether he would benefit from a medication to prevent more attacks.   Please see below for your test results.    Resulted Orders   CBC with Diff Plt ([facility])   Result Value Ref Range    WBC 18.4 (H) 4.0 - 11.0 K/uL    Lymphocytes # 3.3 0.8 - 5.3 K/uL    % Lymphocytes 17.7 (L) 20.0 - 48.0 %L    Mid # 1.6 0.0 - 2.2 K/uL    Mid % 8.6 0.0 - 20.0 %M    GRANULOCYTES # 13.6 (H) 1.6 - 8.3 K/uL    % Granulocytes 73.7 40.0 - 75.0 %G    RBC 3.8 (L) 4.4 - 5.9 M/uL    Hemoglobin 11.6 (L) 13.3 - 17.7 g/dL    Hematocrit 37.9 (L) 40.0 - 53.0 %    .2 (H) 78.0 - 100.0 fL    MCH 30.7 26.5 - 35.0    MCHC 30.6 (L) 32.0 - 36.0 g/dL    Platelets 567.0 (H) 150.0 - 450.0 K/uL   Uric Acid ([facility])   Result Value Ref Range    Uric Acid 8.1 (H) 3.0 - 8.0 mg/dL    Narrative    Test performed by:  [facility]  [address] [place] [address]   Magnesium ([facility])   Result Value Ref Range    Magnesium 2.4 1.8 - 2.6 mg/dL    Narrative    Test performed by:  [facility]  [address] [place] [address]   Basic Metabolic Profile ([facility])   Result Value Ref Range    Sodium 134 (L) 136 - 145 mmol/L    Potassium 4.2 3.5 - 5.0 mmol/L    Chloride 102 98 - 107 mmol/L    CO2, Total 22 22 - 31 mmol/L    Anion Gap  10 5 - 18 mmol/L    Glucose 139 (H) 70 - 125 mg/dL    Calcium 8.9 8.5 - 10.5 mg/dL    Urea Nitrogen 14 8 - 22 mg/dL    Creatinine 0.78 0.70 - 1.30 mg/dL    GFR Estimate If Black >60 >60 mL/min/1.73m2    GFR Estimate >60 >60 mL/min/1.73m2    Narrative    Test performed by:  [facility] [address] [place] [address]  Fasting Glucose reference range is 70-99 mg/dL per  American Diabetes Association (ADA) guidelines.       If you have any questions, please call the clinic to make an appointment.    Sincerely,    Chetna Butts MD

## 2019-01-22 NOTE — PROGRESS NOTES
Transitional Care / Medication Management Note                                                       Touly was referred by Dr. Butts for pharmacy services for TCM.    MEDICATION REVIEW:  Discussed all medication indications, dosage and effectiveness, adverse effects, and adherence with patient/caregiver.    Pt had meds with them: yes  Pt had med list with them: yes  Pt was knowledgeable about meds: yes  Medications set up by: Son  Medications administered by someone else (e.g., LTCF): No  Pt uses a medication box or automated dispenser: yes - son sets up all the meds  Called pharmacy to obtain or clarify med list:  no  Called HHN or LTCF to obtain or clarify med list:  no  Patient has been seen by PharmD in past 6 months:  no   Needed: yes; Name: Ashley Martins Language: Ina    Medication Discrepancies  Medications on EMR med list that pt is NOT taking:  yes, Carvedilol  Medications pt IS taking that are NOT on EMR med list (e.g., from specialist, hospital): yes, Clopidogrel, colchicine, docusate, iron, furosemide, lidocaine patch, metoprolol tartrate, potassium chloride, tramadol  OTC meds/ dietary supplements pt taking on own that are NOT on EMR med list:  none  Dosage listed differently than how patient is taking: none  Frequency listed differently than how patient is taking: none  Duplicate medication on list (two occurrences of the same medication):  none  TOTAL NUMBER OF MEDICATION DISCREPANCIES:  10    The following medications were added in the hospital:    Clopidogrel, colchicine, docusate, iron, furosemide, lidocaine patch, metoprolol tartrate, potassium chloride, tramadol  The following medications were discontinued in the hospital:    Carvedilol  The following medications had dose/frequency changes in the hospital:    Atorvastatin    Subjective                                                       Patient reports the following problems or concerns with their medications:  none  Patient  reports the following adverse reactions to medications:  none  Pt reports missing doses:  never  Additional subjective information (e.g., reason for visit, frequency of PRNs, reasons meds were D/C ed):    Patient had concern that he has not been eating well and would like to know if there are any medications to improve his appetite - Referred to Dr. Butts    Eating less than when he was in the hospital - still feels hungry, but after he eats a few bites he loses his appetite; No N/V    Diet has not changed much (rice, chicken, porridge, fruit) except for decreasing salt intake    Was not prescribed a Prednisone taper, although discharge paperwork included one (Prednisone 40 mg daily x2 days, 20 mg x3 days, 15 mg x3 days, 10 mg x3 days, 5 mg x3 days)    Has not taken any tramadol or used any lidocaine patches    Son states that he told his father to tell him if he needed anything for pain, but it would seem the patient did not communicate this and states he has been having some back pain    Has not been taking docusate - no issues with bowels    No issues with gout flares currently    Objective                                                       There is no problem list on file for this patient.      No current outpatient medications on file.       Social History     Tobacco Use     Smoking status: Never Smoker     Smokeless tobacco: Never Used   Substance Use Topics     Alcohol use: Not on file     Drug use: Not on file       CrCl cannot be calculated (No order found.).    No results found for: A1C  Last Comprehensive Metabolic Panel:  No results found for: NA, POTASSIUM, CHLORIDE, CO2, ANIONGAP, GLC, BUN, CR, GFRESTIMATED, SLOAN    BP Readings from Last 3 Encounters:   01/22/19 118/87       The ASCVD Risk score (Landis JOSE Nieves., et al., 2013) failed to calculate for the following reasons:    Cannot find a previous HDL lab    Cannot find a previous total cholesterol lab    PHQ-9 score:  No flowsheet data  found.      Assessment                                                       Gout - controlled     Taking colchicine 0.6 mg BID    CAD - controlled     Lasix + Potassium for 7 days    Sees cardiology    Plan/Recommendations                                                       Updated medication list in the EMR; deleted meds patient no longer taking and added meds patient is now taking, and changed doses where there was a dose discrepancy.    All medications were reviewed and found to be indicated, effective, safe and convenient/ affordable unless drug therapy problem(s) was/were identified, as are described below.      Completed at this visit    Gout     Obtained uric acid labs at today's visit - value 8.1    Referred to Dr. Butts    CAD    Obtained BMP today    Per Dr. Butts, no indication to continue lasix at this time    To be completed at a future visit  Gout    Reassess for gout flares    Consider adding Allopurinol for urate-lowering therapy    Consider testing HLA-B*[id number] due to  ethinicity    CAD    Follow-up with recommendations from cardiology    Options for treatment and/or follow-up care were reviewed with the patient.  Nikobev was engaged and actively involved in the decision making process, verbalized understanding of the options discussed, and was satisfied with the final plan.      Follow-up                                                       Patient should follow up with Dr. Butts.  Patient was provided with written instructions/medication list via AVS.     Dr. Butts was provided the recommendations above  in clinic today and Dr. Aiken was available for supervision during this visit and is the authorizing prescriber for this visit through the pharmacist collaborative practice agreement.    Stacey Rogers, PharmD Resident      Drug therapy problems identified  1. Med: Allopurinol - Indication - Needs additional drug therapy - Resolution: Intiate Drug; deferred to future  visit    # of medical conditions addressed: 2  # of medications addressed: 12  # of medication discrepancies identified: 10  # of DTP identified: 1  Time spent: 20 minutes  Level of service: 2NC    Due to patient being non-English speaking/uses sign language, an  was used for this visit. Only for face-to-face interpretation by an external agency, date and length of interpretation can be found on the scanned worksheet.     name: Ashley Martins  Agency: [employer]  Language: Ina   Telephone number: [phone]  Type of interpretation: Face-to-face, spoken

## 2019-01-22 NOTE — PROGRESS NOTES
Hospitalization Follow-up Visit         [facility]       Hospital Follow-up Visit:    Hospital:  [facility]   Date of Admission: 1/10/19  Date of Discharge: 1/19/19  Reason(s) for Admission: abnormal angiogram - CABG x5 on 1/11 with vein harvest from L leg. Extubdated on POD0. Gout flare during hospital stay with colchicine and steroid. Had some concern for tachy/loren syndrome in hospital, asymptomatic, evaluated by cardiology, no pacemarker at this time.             Problems taking medications regularly: No prednisone taper picked up     Post Discharge Medication Reconciliation: See Pharm D note.        Problems adhering to non-medication therapy:  None       Medications reviewed by: by PharmD    Summary of hospitalization:  [facility] discharge summary reviewed  Diagnostic Tests/Treatments reviewed.  Follow up needed: cardiology, CV surg  Other Healthcare Providers Involved in Patient s Care:         Specialist appointment - Reviewed on discharge paperwork with son - pt has upcoming appts with cardiac rehab, CV surg and cardiology  Update since discharge: improved. Still having cough, not sure if this is related. Started at the end of hospitalization. No trouble breathing other than still having incisional pain with deep breaths.   Plan of care communicated with patient            A [employer]  was used for this visit.            Review of Systems:   CONSTITUTIONAL: no fatigue, no unexpected change in weight  SKIN: no worrisome rashes, no worrisome moles, no worrisome lesions  EYES: no acute vision problems or changes  ENT: no ear problems, no mouth problems, no throat problems  RESP: See above  CV: no chest pain, no palpitations, no new or worsening peripheral edema  GI: no nausea, no vomiting, no constipation, no diarrhea            Physical Exam:     Vitals:    01/22/19 1621   BP: 118/87   BP Location: Left arm   Patient Position: Sitting   Cuff Size: Adult Regular   Pulse: 94   Resp: 20   Temp: 97.4  " F (36.3  C)   TempSrc: Oral   SpO2: 99%   Weight: 63.2 kg (139 lb 6.4 oz)   Height: 1.492 m (4' 10.75\")     Body mass index is 28.4 kg/m .    GENERAL: healthy, alert and no distress  Chest wall: Sternotomy incision is c/d/i without surrounding erythema.   RESP: lungs clear to auscultation, no crackles. + intermittent end expiratory wheezes.   CV: regular rates and rhythm, normal S1 S2, no S3 or S4 and no murmur, no click or rub -   ABDOMEN: soft, no tenderness, no  hepatosplenomegaly, no masses, normal bowel sounds  MS: extremities- no gross deformities noted, no edema. Knees and ankles are without swelling or erythema.   SKIN: no suspicious lesions, no rashes         Results:     Results from the last 24 hours   Results for orders placed or performed in visit on 01/22/19 (from the past 24 hour(s))   CBC with Diff Plt ([facility])   Result Value Ref Range    WBC 18.4 (H) 4.0 - 11.0 K/uL    Lymphocytes # 3.3 0.8 - 5.3 K/uL    % Lymphocytes 17.7 (L) 20.0 - 48.0 %L    Mid # 1.6 0.0 - 2.2 K/uL    Mid % 8.6 0.0 - 20.0 %M    GRANULOCYTES # 13.6 (H) 1.6 - 8.3 K/uL    % Granulocytes 73.7 40.0 - 75.0 %G    RBC 3.8 (L) 4.4 - 5.9 M/uL    Hemoglobin 11.6 (L) 13.3 - 17.7 g/dL    Hematocrit 37.9 (L) 40.0 - 53.0 %    .2 (H) 78.0 - 100.0 fL    MCH 30.7 26.5 - 35.0    MCHC 30.6 (L) 32.0 - 36.0 g/dL    Platelets 567.0 (H) 150.0 - 450.0 K/uL   Basic Metabolic Profile ([facility])   Result Value Ref Range    Sodium 134 (L) 136 - 145 mmol/L    Potassium 4.2 3.5 - 5.0 mmol/L    Chloride 102 98 - 107 mmol/L    CO2, Total 22 22 - 31 mmol/L    Anion Gap 10 5 - 18 mmol/L    Glucose 139 (H) 70 - 125 mg/dL    Calcium 8.9 8.5 - 10.5 mg/dL    Urea Nitrogen 14 8 - 22 mg/dL    Creatinine 0.78 0.70 - 1.30 mg/dL    GFR Estimate If Black >60 >60 mL/min/1.73m2    GFR Estimate >60 >60 mL/min/1.73m2    Narrative    Test performed by:  [facility] [address] [place] [address]  Fasting Glucose reference range is 70-99 mg/dL " per  American Diabetes Association (ADA) guidelines.       Assessment and Plan      Touly was seen today for hospital f/u.    Diagnoses and all orders for this visit:    Hospital discharge follow-up  Doing well post follow-up. Doesn't have signs of gout on exam today and hasn't in several yeats so will check uric acid level today. HR actually somewhat elevated in the setting of beta blockade but will defer further titration to cardiology given bradycardia in hospital. Will check BMP but no indication at this time to continue lasix - weight is unchanged from discharge. Does have wheezing on exam and no lung hx - likely viral induced URI as no focal findings. Discussed trying an inhaler but as he doesn't feel dyspnic we agreed that risk of tachycardia with albuterol might outweigh benefits at this time. Counseled to continue incentive spirometry for now, back if breathing worsens. Back in March after seeing specialists, sooner if needed.   -     Cancel: Basic Metabolic Panel ([facility])  -     CBC with Diff Plt ([facility] )  -     Uric Acid ([facility])  -     Magnesium ([facility])  -     Basic Metabolic Profile ([facility])      E&M code to be billed if TCM cannot be: 30495    Options for treatment and follow-up care were reviewed with the patient  Main S Rommel   engaged in the decision making process and verbalized understanding of the options discussed and agreed with the final plan.      Chetna Butts MD  Staffed with Dr. Aiken

## 2019-01-22 NOTE — PROGRESS NOTES
Preceptor Attestation:   Patient seen, evaluated and discussed with the resident. I have verified the content of the note, which accurately reflects my assessment of the patient and the plan of care.   Supervising Physician:  Jameson Aiken MD

## 2019-01-23 ENCOUNTER — AMBULATORY - HEALTHEAST (OUTPATIENT)
Dept: CARDIAC REHAB | Facility: CLINIC | Age: 64
End: 2019-01-23

## 2019-01-23 DIAGNOSIS — Z95.1 S/P CABG (CORONARY ARTERY BYPASS GRAFT): ICD-10-CM

## 2019-01-23 DIAGNOSIS — I25.10 CORONARY ARTERY DISEASE INVOLVING NATIVE CORONARY ARTERY OF NATIVE HEART, ANGINA PRESENCE UNSPECIFIED: ICD-10-CM

## 2019-01-23 PROBLEM — I10 HTN (HYPERTENSION): Status: ACTIVE | Noted: 2019-01-03

## 2019-01-23 PROBLEM — E78.5 HYPERLIPIDEMIA LDL GOAL <70: Status: ACTIVE | Noted: 2019-01-23

## 2019-01-23 NOTE — PROGRESS NOTES
I have verified the content of the note, which accurately reflects my assessment of the patient and the plan of care.   Anna Rodríguez, PharmD

## 2019-01-24 ENCOUNTER — AMBULATORY - HEALTHEAST (OUTPATIENT)
Dept: CARDIAC REHAB | Facility: CLINIC | Age: 64
End: 2019-01-24

## 2019-01-24 DIAGNOSIS — I49.5 SSS (SICK SINUS SYNDROME) (H): ICD-10-CM

## 2019-01-24 DIAGNOSIS — Z95.1 S/P CABG (CORONARY ARTERY BYPASS GRAFT): ICD-10-CM

## 2019-02-05 ENCOUNTER — AMBULATORY - HEALTHEAST (OUTPATIENT)
Dept: CARDIAC REHAB | Facility: CLINIC | Age: 64
End: 2019-02-05

## 2019-02-05 ENCOUNTER — OFFICE VISIT - HEALTHEAST (OUTPATIENT)
Dept: CARDIOLOGY | Facility: CLINIC | Age: 64
End: 2019-02-05

## 2019-02-05 DIAGNOSIS — Z95.1 S/P CABG X 5: ICD-10-CM

## 2019-02-05 DIAGNOSIS — Z95.1 S/P CABG (CORONARY ARTERY BYPASS GRAFT): ICD-10-CM

## 2019-02-05 ASSESSMENT — MIFFLIN-ST. JEOR: SCORE: 1255.74

## 2019-02-14 ENCOUNTER — AMBULATORY - HEALTHEAST (OUTPATIENT)
Dept: CARDIAC REHAB | Facility: CLINIC | Age: 64
End: 2019-02-14

## 2019-02-14 DIAGNOSIS — Z95.1 S/P CABG (CORONARY ARTERY BYPASS GRAFT): ICD-10-CM

## 2019-02-19 ENCOUNTER — AMBULATORY - HEALTHEAST (OUTPATIENT)
Dept: CARDIAC REHAB | Facility: CLINIC | Age: 64
End: 2019-02-19

## 2019-02-19 DIAGNOSIS — Z95.1 S/P CABG (CORONARY ARTERY BYPASS GRAFT): ICD-10-CM

## 2019-03-04 ENCOUNTER — COMMUNICATION - HEALTHEAST (OUTPATIENT)
Dept: CARDIOLOGY | Facility: CLINIC | Age: 64
End: 2019-03-04

## 2019-03-18 ENCOUNTER — TELEPHONE (OUTPATIENT)
Dept: FAMILY MEDICINE | Facility: CLINIC | Age: 64
End: 2019-03-18

## 2019-03-18 NOTE — TELEPHONE ENCOUNTER
[facility] phone call message- general phone call:    Reason for call: He would like a call back re his dad is having headaches and he needs to know what he can take.    Return call needed: Yes    OK to leave a message on voice mail? Yes    Primary language: Hmong      needed? Yes    Call taken on March 18, 2019 at 11:26 AM by Yoseph Blanco

## 2019-03-18 NOTE — TELEPHONE ENCOUNTER
Patient's son states that he has been having HA's for the past couple of days.  He believes they are migraines.  He states his dad has not c/o of CP, SOB, N/T down extremities.  Patient is not with caller so not able to have patient rate pain.  Son states patient has been taking tylenol 2 tablets in the morning and in the evening.  Did suggest that patient could take 2 tablets of tylenol every 4-6 hours WA to see if that will help.  Also encouraged that patient stay hydrated with H2) 6-8 cups per day.    Does have appointment with Dr. Butts on 3/20/19/      JEIMY Burkett RN

## 2019-03-20 ENCOUNTER — OFFICE VISIT (OUTPATIENT)
Dept: FAMILY MEDICINE | Facility: CLINIC | Age: 64
End: 2019-03-20
Payer: COMMERCIAL

## 2019-03-20 VITALS
OXYGEN SATURATION: 98 % | HEART RATE: 57 BPM | WEIGHT: 142.2 LBS | RESPIRATION RATE: 16 BRPM | SYSTOLIC BLOOD PRESSURE: 181 MMHG | TEMPERATURE: 98 F | BODY MASS INDEX: 28.97 KG/M2 | DIASTOLIC BLOOD PRESSURE: 82 MMHG

## 2019-03-20 DIAGNOSIS — Z95.1 S/P CABG (CORONARY ARTERY BYPASS GRAFT): Primary | ICD-10-CM

## 2019-03-20 DIAGNOSIS — H49.01 THIRD NERVE PALSY OF RIGHT EYE: ICD-10-CM

## 2019-03-20 RX ORDER — METOPROLOL TARTRATE 25 MG/1
12.5 TABLET, FILM COATED ORAL 2 TIMES DAILY
Qty: 90 TABLET | Refills: 3 | Status: SHIPPED | OUTPATIENT
Start: 2019-03-20 | End: 2019-04-18

## 2019-03-20 RX ORDER — ASPIRIN 81 MG/1
81 TABLET, CHEWABLE ORAL DAILY
Qty: 90 TABLET | Refills: 3 | Status: SHIPPED | OUTPATIENT
Start: 2019-03-20 | End: 2019-04-18

## 2019-03-20 RX ORDER — CLOPIDOGREL BISULFATE 75 MG/1
75 TABLET ORAL DAILY
Qty: 90 TABLET | Refills: 3 | Status: SHIPPED | OUTPATIENT
Start: 2019-03-20 | End: 2019-04-18

## 2019-03-20 RX ORDER — ATORVASTATIN CALCIUM 80 MG/1
80 TABLET, FILM COATED ORAL AT BEDTIME
Qty: 90 TABLET | Refills: 3 | Status: SHIPPED | OUTPATIENT
Start: 2019-03-20 | End: 2019-04-18

## 2019-03-20 RX ORDER — ACETAMINOPHEN 325 MG/1
650 TABLET ORAL EVERY 6 HOURS PRN
Qty: 100 TABLET | Refills: 3 | Status: SHIPPED | OUTPATIENT
Start: 2019-03-20

## 2019-03-20 NOTE — PROGRESS NOTES
SUBJECTIVE     Chief Complaint   Patient presents with     Headache     Pt states his migraine started last Thursday and lasted until about two days ago when it started to relieve itself.  He stated he went to the pharmacy and got an OTC migraine medication and the pain went away.  However, now his R eye is off center and the eyelid is closed.     Heart Problem     Pt is also here to follow up on his heart bypass.     Medication Reconciliation     Complete.        Subjective: Main Carney is a 63 year old male with PMH of CAD s/p CABG and gout here for headache.    Eye pain x 6 days. Normally has no gaze deviation and no lid lag, confirmed by family member in room. R eyelid has been closed for 2 days. Severe pain on inside of L eye started 6 days ago. Had never had anything like that before. 2 days ago started OTC pain medication and the pain has since gone away. No fevers, no other symtpoms. No weakness in arms/legs. No numbness/tingling.  Blood pressure at home have ranged from 140-180s SBP. Has taken all of his medications earlier today.     No chest pain or trouble breathing.     ROS:    General: No fevers or weight loss  Skin: No new rashes or lesions.    PMH/PSH, FamHx, Meds, Allergies reviewed and updated as needed.    Social History     Socioeconomic History     Marital status:      Spouse name: None     Number of children: None     Years of education: None     Highest education level: None   Occupational History     None   Social Needs     Financial resource strain: None     Food insecurity:     Worry: None     Inability: None     Transportation needs:     Medical: None     Non-medical: None   Tobacco Use     Smoking status: Never Smoker     Smokeless tobacco: Never Used   Substance and Sexual Activity     Alcohol use: None     Drug use: None     Sexual activity: None   Lifestyle     Physical activity:     Days per week: None     Minutes per session: None     Stress: None   Relationships      Social connections:     Talks on phone: None     Gets together: None     Attends [employer] service: None     Active member of club or organization: None     Attends meetings of clubs or organizations: None     Relationship status: None     Intimate partner violence:     Fear of current or ex partner: None     Emotionally abused: None     Physically abused: None     Forced sexual activity: None   Other Topics Concern     None   Social History Narrative     None           OBJECTIVE     /82 (BP Location: Left arm, Patient Position: Sitting, Cuff Size: Adult Regular)   Pulse 57   Temp 98  F (36.7  C) (Oral)   Resp 16   Wt 64.5 kg (142 lb 3.2 oz)   SpO2 98%   BMI 28.97 kg/m    Body mass index is 28.97 kg/m .    Physical exam:  Gen: No acute distress  HEENT: R eye ptosis with R lateral gaze deviation at rest. R pupil slightly larger than L (5mm on R, 4 mm on L). R pupil with limited constriction to light confrontation in both L and R eyes. L eye constricts to light in R eye and L eye. Unable to look past midline with R eye, difficulty moving eye up and down at midline. L eye without any extraocular movement defects.   CV: Regular rate and rhythm, no murmurs/rubs/gallops  Resp: Clear to auscultation bilaterally, no crackles or wheezes  Neuro: Symmetric eyebrow raise, smile, palate raise, tongue protrusion and shoulder shrug. Symmetric sensation to light touch across face. 5/5 strength to arm flexion/extension and internal/external rotation and hip flexion, knee extension, and plantar/dorsiflexion. Symmetric sensation intact to light touch across distal extremities. 2+ biceps, brachioradialis, and patellar reflexes, no ankle clonus.   Skin: no suspicious lesions or rashes  Psych: Mood and affect appropriate, mentation appears normal.    No results found for this or any previous visit (from the past 24 hour(s)).      ASSESSMENT AND PLAN     Tobev Carney is a 63 year old male here for headache and eye pain    1.  Third nerve palsy of right eye  Suspect possible ischemic event given hypertensive today, risk factors and acute onset, however symptoms present well over 2 days now so not a candidate for acute intervention. Already on atorvastatin 80 mg, ASA, and plavix. No other neuro changes on exam. Discussed with Dr. Cornejo of neurology, recommended to ED with MRI/MRA head/neck with COW. Discussed with Dr. Concepcion of ED who will see patient on arrival. Patient and family member endorse understanding and will transport patient to ED.     2. S/P CABG (coronary artery bypass graft)  Refilled medications  - acetaminophen (TYLENOL) 325 MG tablet; Take 2 tablets (650 mg) by mouth every 6 hours as needed for pain  Dispense: 100 tablet; Refill: 3  - aspirin (ASA) 81 MG chewable tablet; Take 1 tablet (81 mg) by mouth daily  Dispense: 90 tablet; Refill: 3  - atorvastatin (LIPITOR) 80 MG tablet; Take 1 tablet (80 mg) by mouth At Bedtime  Dispense: 90 tablet; Refill: 3  - clopidogrel (PLAVIX) 75 MG tablet; Take 1 tablet (75 mg) by mouth daily  Dispense: 90 tablet; Refill: 3  - metoprolol tartrate (LOPRESSOR) 25 MG tablet; Take 0.5 tablets (12.5 mg) by mouth 2 times daily  Dispense: 90 tablet; Refill: 3    Chetna Butts MD, PGY-3  I precepted today with Mahendra Jack MD.

## 2019-03-20 NOTE — PROGRESS NOTES
Preceptor Attestation:   Patient seen, evaluated and discussed with the resident. I have verified the content of the note, which accurately reflects my assessment of the patient and the plan of care.   Supervising Physician:  Mahendra Jack MD

## 2019-03-20 NOTE — NURSING NOTE
Due to patient being non-English speaking/uses sign language, an  was used for this visit. Only for face-to-face interpretation by an external agency, date and length of interpretation can be found on the scanned worksheet.     name: Alfredo Kohli  Agency: [employer]  Language: Ina   Telephone number: [phone]  Type of interpretation: Face-to-face, spoken

## 2019-03-22 ENCOUNTER — TELEPHONE (OUTPATIENT)
Dept: FAMILY MEDICINE | Facility: CLINIC | Age: 64
End: 2019-03-22

## 2019-03-22 NOTE — TELEPHONE ENCOUNTER
Date of discharge: 03/21/19  Facility of discharge: [facility]  Patient concerns about condition: No concerns at this time.  Patient concerns about medications: No concerns at this time.  Full med reconciliation will be completed at clinic visit.  Patient concerns about transitioning: No concerns at this time.  Clinic office visit appointment date: 03/26/19 at 3:10 w/Dr. Byrd  Patient reminded to bring all medications (prescription and over-the-counter) to clinic appointment: Yes     JOSIAH Gray

## 2019-03-26 ENCOUNTER — OFFICE VISIT (OUTPATIENT)
Dept: FAMILY MEDICINE | Facility: CLINIC | Age: 64
End: 2019-03-26
Payer: COMMERCIAL

## 2019-03-26 ENCOUNTER — OFFICE VISIT (OUTPATIENT)
Dept: PHARMACY | Facility: CLINIC | Age: 64
End: 2019-03-26
Payer: COMMERCIAL

## 2019-03-26 VITALS
WEIGHT: 141.6 LBS | HEIGHT: 60 IN | DIASTOLIC BLOOD PRESSURE: 88 MMHG | HEART RATE: 64 BPM | BODY MASS INDEX: 27.8 KG/M2 | SYSTOLIC BLOOD PRESSURE: 151 MMHG | OXYGEN SATURATION: 99 % | TEMPERATURE: 98.6 F | RESPIRATION RATE: 16 BRPM

## 2019-03-26 DIAGNOSIS — M10.9 GOUT: Primary | ICD-10-CM

## 2019-03-26 DIAGNOSIS — H49.01 WEAKNESS OF RIGHT THIRD CRANIAL NERVE: ICD-10-CM

## 2019-03-26 DIAGNOSIS — M10.9 ACUTE GOUT, UNSPECIFIED CAUSE, UNSPECIFIED SITE: ICD-10-CM

## 2019-03-26 DIAGNOSIS — I10 BENIGN ESSENTIAL HYPERTENSION: Primary | ICD-10-CM

## 2019-03-26 RX ORDER — COLCHICINE 0.6 MG/1
TABLET ORAL
Qty: 60 TABLET | Refills: 0 | Status: SHIPPED | OUTPATIENT
Start: 2019-03-26

## 2019-03-26 RX ORDER — COLCHICINE 0.6 MG/1
CAPSULE ORAL
Refills: 0 | COMMUNITY
Start: 2019-03-21 | End: 2019-03-26

## 2019-03-26 RX ORDER — COLCHICINE 0.6 MG/1
CAPSULE ORAL
Refills: 0 | Status: CANCELLED | OUTPATIENT
Start: 2019-03-26

## 2019-03-26 RX ORDER — LISINOPRIL 10 MG/1
10 TABLET ORAL DAILY
Qty: 30 TABLET | Refills: 1 | Status: SHIPPED | OUTPATIENT
Start: 2019-03-26 | End: 2019-04-18

## 2019-03-26 RX ORDER — CARBOXYMETHYLCELLULOSE SODIUM 5 MG/ML
2 SOLUTION/ DROPS OPHTHALMIC 4 TIMES DAILY PRN
COMMUNITY
Start: 2019-03-26

## 2019-03-26 ASSESSMENT — MIFFLIN-ST. JEOR: SCORE: 1255.98

## 2019-03-26 NOTE — Clinical Note
Something to note: Drug interaction with Colchicine and Statins. If patient has a flare and needs to continue colchicine therapy, monitor closely. Thanks!

## 2019-03-26 NOTE — PROGRESS NOTES
optha     SUBJECTIVE     Chief Complaint   Patient presents with     Hospital F/U     TCM, seen at [facility]     Eye Problem     Testing for minor stroke done at hospital, lack of blood flow to eye, unable to open right eye       Subjective: Main Carney is a 63 year old male with history of CAD s/p CABG, gout here for follow-up.    Seen here in clinic on 3/20 for headaches/R eye symptoms, found to have severe ptosis and lateral deviation of R eye so was sent to ED and subsequently admitted for CN III palsy. Already on dual antiplatelet therapy and atorvastatin 80 mg for CABG. MRI showed multifocal stenoses but nothing to suggest acute CVA. Evaluated by neurology and ophthalmology. Normal ESR. Recommended optho follow up in 8 weeks. Here today for follow-up. Thought to be likely ischemic and would slowly improve over the next 4-5 months.     Pt wishes to know when his eye will get back to normal and wants to know about any medication or injection that could fix his eye. Continues to use eye drops regularly. They attempted patching at first but this was bothersome to patient so discontinued. Otherwise no new concerns. He sees no changes in his visual symptoms. No chest pain, no trouble breathing.     Has a BP cuff at home, ranges from 130-140s SBP at home but not very sure about this.    Pt thinks he has only had one gout attack in many years (during this last hospitalization).     PMH/PSH, FamHx, Meds, Allergies reviewed and updated as needed.    Social History     Socioeconomic History     Marital status:      Spouse name: None     Number of children: None     Years of education: None     Highest education level: None   Occupational History     None   Social Needs     Financial resource strain: None     Food insecurity:     Worry: None     Inability: None     Transportation needs:     Medical: None     Non-medical: None   Tobacco Use     Smoking status: Never Smoker     Smokeless tobacco: Never Used  "  Substance and Sexual Activity     Alcohol use: None     Drug use: None     Sexual activity: None   Lifestyle     Physical activity:     Days per week: None     Minutes per session: None     Stress: None   Relationships     Social connections:     Talks on phone: None     Gets together: None     Attends Christianity service: None     Active member of club or organization: None     Attends meetings of clubs or organizations: None     Relationship status: None     Intimate partner violence:     Fear of current or ex partner: None     Emotionally abused: None     Physically abused: None     Forced sexual activity: None   Other Topics Concern     None   Social History Narrative     None           OBJECTIVE     /88 (BP Location: Left arm, Patient Position: Sitting, Cuff Size: Adult Regular)   Pulse 64   Temp 98.6  F (37  C) (Oral)   Resp 16   Ht 1.486 m (4' 10.5\")   Wt 64.2 kg (141 lb 9.6 oz)   SpO2 99%   BMI 29.09 kg/m    Body mass index is 29.09 kg/m .    Physical exam:  Gen: No acute distress  HEENT: Ptosis of R eye with lateral deviation of R eye. R eye unable to cross midline to look medially. PERRL.   Chest: Chest wall scar c/d/i. No surrounding erythema or swelling.   CV: Regular rate and rhythm, no murmurs/rubs/gallops  Resp: Clear to auscultation bilaterally, no crackles or wheezes  Psych: Mood and affect appropriate, mentation appears normal.      No results found for this or any previous visit (from the past 24 hour(s)).      ASSESSMENT AND PLAN     Touly FADI Carney is a 63 year old male here for follow-up    1. Weakness of right third cranial nerve  No clinical changes on exam. Did discuss unclear prognosis and hopeful recovery over months. Will have him follow up with ophthalmology in 2 months especially as he has vascular disease he should likely get a dilated eye exam anyway. Continue eye lubrication.   - OPHTHALMOLOGY ADULT REFERRAL; Future    2. Benign essential hypertension  Elevated BP today, " and was elevated to SBP 170s in hospital and started on lisinopril 5 mg at that time. Will increase to 10 mg today, have him take his BP at home twice per week when relaxed and bring in numbers in 2 weeks for BP check and repeat BMP  - lisinopril (PRINIVIL/ZESTRIL) 10 MG tablet; Take 1 tablet (10 mg) by mouth daily  Dispense: 30 tablet; Refill: 1    3. Acute gout, unspecified cause, unspecified site  Does have uric acid >8 but only one gout attack in many years per patient. Discussed prophylactic therapy vs watchful waiting, pt opts to discontinue medication at this time. Did send with gout action plan (see AVS) to start colchicine if acute attack followed by prophylaxis and follow-up. Colchicine does interact with statins per pharmD (increased risk myalgias) but due to increased risk of NSAIDs and CV disease will monitor if he does indeed start colchicine.     Back in 2 weeks    Chetna Butts MD, PGY-3  I precepted today with Jameson Aiken MD.

## 2019-03-26 NOTE — PATIENT INSTRUCTIONS
Gout action plan:  -if gout happens, take two colchicine pills, followed by one colchicine pill one hour later. Then take colchicine one pill daily from there.     No patches for now  Take two lisinopril 5 mg daily (take both at once) until they run out, then start 10 mg daily  We will call about opthalomology referral, call our clinic if you don't hear from us this week  Follow up in 2-3 weeks here for labs and blood pressure follow-up  Dr. Chetna Butts    OPHTHALMOLOGY REFERRAL  ADDENDUM 3/28/2019 11:20 AM Referral, demographics, office note and medication list faxed to [facility] at [phone] who will contact patient/family to schedule.  [facility]  Phone:  [phone]  Fax:  [phone]  [facility]

## 2019-03-26 NOTE — NURSING NOTE
Due to patient being non-English speaking/uses sign language, an  was used for this visit. Only for face-to-face interpretation by an external agency, date and length of interpretation can be found on the scanned worksheet.     name: Edgar Alarcon  Agency: [employer]  Language: Ina   Telephone number:   Type of interpretation: Face-to-face, spoken

## 2019-03-26 NOTE — PROGRESS NOTES
Transitional Care / Medication Management Note                                                       Touly was referred by Dr. Butts for pharmacy services for TCM.    MEDICATION REVIEW:  Discussed all medication indications, dosage and effectiveness, adverse effects, and adherence with patient/caregiver.    Pt had meds with them: no  Pt had med list with them: yes  Pt was knowledgeable about meds: Somewhat, son manages and is knowledgeable  Medications set up by: Son  Medications administered by someone else (e.g., LTCF): No  Pt uses a medication box or automated dispenser: yes - son sets up  Patient has been seen by PharmD in past 6 months:  yes    Medication Discrepancies  Medications on EMR med list that pt is NOT taking:  yes, Lidocaine patch  Medications (including OTCs/supplements) pt IS taking that are NOT on EMR med list (e.g., from specialist, hospital): yes, Lisinopril, Refresh eye drops  Dosage or frequency listed differently than how patient is taking: none  Duplicate medication on list (two occurrences of the same medication):  none  TOTAL NUMBER OF MEDICATION DISCREPANCIES:  3    The following medications were added in the hospital:    Refresh Plus eye drops    Lisinopril 5 mg  The following medications were discontinued in the hospital:    None  The following medications had dose/frequency changes in the hospital:    None  The following medication changes made in the hospital were not implemented by the patient:    None    Subjective                                                       Patient reports the following problems or concerns with their medications:  none  Patient reports the following adverse reactions to medications:  none  Pt reports missing doses:  never  Additional subjective information (e.g., reason for visit, frequency of PRNs, reasons meds were D/C ed):    Hospital follow-up from 3/20-3/21 due to right eye drooping    Patient met with medical student and Dr. Butts before  PharmD visit and discussed gout and HTN    Followed-up and discussed plan together, then went over meds with patient/patient's son to verify - no concerns/questions    Rarely uses tramadol, but would like to keep it on the list for now    Has used 1-2 tablets in the past month or two    Objective                                                       Patient Active Problem List   Diagnosis     HTN (hypertension)     Hyperlipidemia LDL goal <70     S/P CABG (coronary artery bypass graft)       Current Outpatient Medications   Medication Sig Dispense Refill     carboxymethylcellulose (REFRESH PLUS) 0.5 % SOLN ophthalmic solution Place 2 drops into the right eye 4 times daily as needed for dry eyes       colchicine (COLCYRS) 0.6 MG tablet Take 2 tabs (1.2 mg) by mouth at sign of first gout flare, then take 1 tab (0.6 mg) an hour later. Take 1 tab by mouth daily thereafter. 60 tablet 0     acetaminophen (TYLENOL) 325 MG tablet Take 2 tablets (650 mg) by mouth every 6 hours as needed for pain 100 tablet 3     aspirin (ASA) 81 MG chewable tablet Take 1 tablet (81 mg) by mouth daily 90 tablet 3     atorvastatin (LIPITOR) 80 MG tablet Take 1 tablet (80 mg) by mouth At Bedtime 90 tablet 3     clopidogrel (PLAVIX) 75 MG tablet Take 1 tablet (75 mg) by mouth daily 90 tablet 3     ferrous sulfate (FEROSUL) 325 (65 Fe) MG tablet Take 325 mg by mouth 2 times daily       lisinopril (PRINIVIL/ZESTRIL) 10 MG tablet Take 1 tablet (10 mg) by mouth daily 30 tablet 1     metoprolol tartrate (LOPRESSOR) 25 MG tablet Take 0.5 tablets (12.5 mg) by mouth 2 times daily 90 tablet 3     traMADol (ULTRAM) 50 MG tablet Take 50 mg by mouth every 8 hours as needed for moderate to severe pain         Social History     Tobacco Use     Smoking status: Never Smoker     Smokeless tobacco: Never Used   Substance Use Topics     Alcohol use: Not on file     Drug use: Not on file       Estimated Creatinine Clearance: 86.9 mL/min (based on SCr of 0.78  mg/dL).    No results found for: A1C  Last Comprehensive Metabolic Panel:  Sodium   Date Value Ref Range Status   01/22/2019 134 (L) 136 - 145 mmol/L Final     Potassium   Date Value Ref Range Status   01/22/2019 4.2 3.5 - 5.0 mmol/L Final     Chloride   Date Value Ref Range Status   01/22/2019 102 98 - 107 mmol/L Final     Glucose   Date Value Ref Range Status   01/22/2019 139 (H) 70 - 125 mg/dL Final     Urea Nitrogen   Date Value Ref Range Status   01/22/2019 14 8 - 22 mg/dL Final     Creatinine   Date Value Ref Range Status   01/22/2019 0.78 0.70 - 1.30 mg/dL Final     GFR Estimate   Date Value Ref Range Status   01/22/2019 >60 >60 mL/min/1.73m2 Final     Calcium   Date Value Ref Range Status   01/22/2019 8.9 8.5 - 10.5 mg/dL Final       BP Readings from Last 3 Encounters:   03/26/19 151/88   03/20/19 181/82   01/22/19 118/87       The ASCVD Risk score (Yehuda GARCIA Jr., et al., 2013) failed to calculate for the following reasons:    Cannot find a previous HDL lab    Cannot find a previous total cholesterol lab    PHQ-9 score:  No flowsheet data found.      Assessment / Plan                                                       Updated medication list in the EMR; deleted meds patient no longer taking and added meds patient is now taking, and changed doses where there was a dose discrepancy.    All medications were reviewed and found to be indicated, effective, safe and convenient/ affordable unless drug therapy problem(s) was/were identified, as are described below.      HTN - uncontrolled     /88 today    Increase Lisinopril to 10 mg daily    Gout Prophylaxis - controlled     Discontinue colchicine    Developed gout action plan:    Take 1.2 mg at first sign of gout flare, then take 0.6 mg 1 hour after. Take 0.6 mg daily thereafter.     Options for treatment and/or follow-up care were reviewed with the patient.  Main was engaged and actively involved in the decision making process, verbalized understanding of  the options discussed, and was satisfied with the final plan.    Patient was provided with written instructions/medication list via AVS.     Follow-up                                                       Medication issues be addressed at a future visit      If patient experiences gout flare, monitor use with statin due to drug interaction (increased risk for myalgia)    Patient has tolerated in the past and statin is a necessary medication    Reassess BP and monitor Scr & K at next visit in 2-3 weeks    Dr. Butts was provided the recommendations above  in clinic today and Dr. Aiken was available for supervision during this visit and is the authorizing prescriber for this visit through the pharmacist collaborative practice agreement.    Stacey Rogers, PharmD Resident      Drug therapy problems identified  1. Med: Lisinopril - Efficacy  - Partially effective - Resolution: Change dose; resolved    # of medical conditions addressed: 2  # of medications addressed: 11  # of medication discrepancies identified: 3  # of DTP identified: 1  Time spent: 20 minutes  Level of service: 2NC    Due to patient being non-English speaking/uses sign language, an  was used for this visit. Only for face-to-face interpretation by an external agency, date and length of interpretation can be found on the scanned worksheet.     name: Edgar Alarcon  Agency: [employer]  Language: Ina   Telephone number:   Type of interpretation: Face-to-face, spoken

## 2019-03-29 NOTE — PROGRESS NOTES
I have verified the content of the note, which accurately reflects my assessment of the patient and the plan of care.   Dorothy Lisa, PharmD

## 2019-04-01 ENCOUNTER — TRANSFERRED RECORDS (OUTPATIENT)
Dept: HEALTH INFORMATION MANAGEMENT | Facility: CLINIC | Age: 64
End: 2019-04-01

## 2019-04-05 ENCOUNTER — OFFICE VISIT (OUTPATIENT)
Dept: FAMILY MEDICINE | Facility: CLINIC | Age: 64
End: 2019-04-05
Payer: COMMERCIAL

## 2019-04-05 VITALS
BODY MASS INDEX: 28.27 KG/M2 | TEMPERATURE: 97.9 F | WEIGHT: 144 LBS | DIASTOLIC BLOOD PRESSURE: 79 MMHG | RESPIRATION RATE: 20 BRPM | HEART RATE: 67 BPM | HEIGHT: 60 IN | OXYGEN SATURATION: 99 % | SYSTOLIC BLOOD PRESSURE: 134 MMHG

## 2019-04-05 DIAGNOSIS — H49.01 3RD CRANIAL NERVE PALSY, RIGHT: ICD-10-CM

## 2019-04-05 DIAGNOSIS — H02.401 PTOSIS OF EYELID, RIGHT: ICD-10-CM

## 2019-04-05 DIAGNOSIS — I10 ESSENTIAL HYPERTENSION: ICD-10-CM

## 2019-04-05 DIAGNOSIS — Z95.1 S/P CABG (CORONARY ARTERY BYPASS GRAFT): Primary | ICD-10-CM

## 2019-04-05 ASSESSMENT — MIFFLIN-ST. JEOR: SCORE: 1258.93

## 2019-04-05 NOTE — PROGRESS NOTES
"       SUBJECTIVE       Tobev Carney is a 63 year old  male with a PMH significant for:     Patient Active Problem List   Diagnosis     HTN (hypertension)     Hyperlipidemia LDL goal <70     S/P CABG (coronary artery bypass graft)     He presents for follow up of his hypertension, ptosis and cranial nerve 3 palsy of right eye. Patient denies any changes in visual fields, however, does describe a lot of incoordination as he is frequently runs into things due to the complete ptosis of his right eye. He is interested in making his eye better, but understands that it may take more time.     Of note, patient is most concerned with returning to work on 4/12/19. Patient describes that he is not able to drive and is very uncoordinated at the moment due to his ptosis. Patient also endorses bilateral shoulder weakness. Patient currently works as a  at [employer] and requires coordination and strength to do his work.     Patient's son (who helps manage his medications) and spouse were in the room during visit.     PMH, Medications and Allergies were reviewed and updated as needed.        REVIEW OF SYSTEMS     CONSTITUTIONAL: NEGATIVE for fever, chills, change in weight  EYES: NEGATIVE for vision changes or irritation  RESP: NEGATIVE for significant cough or SOB   CV: NEGATIVE for chest pain, palpitations or peripheral edema  GI: NEGATIVE for nausea, abdominal pain, heartburn  : NEGATIVE for frequency, dysuria  NEURO: NEGATIVE for weakness, dizziness or paresthesias        OBJECTIVE     Vitals:    04/05/19 1314   BP: 134/79   BP Location: Left arm   Patient Position: Sitting   Cuff Size: Adult Regular   Pulse: 67   Resp: 20   Temp: 97.9  F (36.6  C)   TempSrc: Oral   SpO2: 99%   Weight: 65.3 kg (144 lb)   Height: 1.473 m (4' 10\")     Body mass index is 30.1 kg/m .    Constitutional: Awake, alert, cooperative, no apparent distress, and appears stated age.  Eyes: Complete ptosis of right eyelid. CNIII " palsy of right eye. PERRL, Left eye normal. Sclera clear, conjunctiva normal.   ENT: Normocephalic, without obvious abnormality, atramatic,  external ears without lesions, oral pharynx with moist mucus membranes, tonsils without erythema or exudates, gums normal and good dentition.  Neck: Supple, symmetrical, trachea midline  Lungs: No increased work of breathing, good air exchange, clear to auscultation bilaterally, no crackles or wheezing.  Cardiovascular: Regular rate and rhythm, normal S1 and S2, no S3 or S4, and no murmur noted. Heart sounds distant. Chest wall scar c/d/i without swelling or edema.  Abdomen: Normal bowel sounds, soft, non-distended, non-tender, no masses palpated, no hepatosplenomegally.   Musculoskeletal: No redness, warmth, or swelling of the joints.  Full range of motion noted.  Motor strength is 5 out of 5 all extremities bilaterally.  Tone is normal.  Neuropsychiatric: Normal affect, mood, orientation, memory and insight.      ASSESSMENT AND PLAN     Ptosis of eyelid, right  3rd cranial nerve palsy, right  Continue to monitor. Has been about a month since onset of symptoms. Head MRI unremarkable for acute pathologies. Patient has seen ophthalmology who agrees with prognosis.   -Patient and family counseled that recovery may take up to 6 months after onset of symptoms for recovery.  -If no meaningful recovery after 6 months, may explore other neurosurgical options at that time.     S/P CABG (coronary artery bypass graft)  Essential hypertension  Stable. Doing well overall. Will check BMP next time.   -Continue lisinopril 10mg daily  -Strength exercises provided for patient    Social  Initially scheduled to return to work on 4/12/19 after his CABG; however, given patient's ptosis that developed shortly after, may need up to additional 6 months off for recovery.   -Work note provided to excuse patient through 4/26/19      RTC in 2 weeks for follow up of paper work and HTN, or sooner if  develops new or worsening symptoms.    Az Payton  4/5/2019    Precepted with Dr. Aiken

## 2019-04-05 NOTE — PATIENT INSTRUCTIONS
Patient Education     Shoulder Exercises: Shoulder Press    This exercise stretches and strengthens your shoulders. Before starting, read through all the instructions. During the exercise, breathe normally and use smooth movements. Stop if you feel any pain. If pain persists, call your healthcare provider.    Hold a ____ pound weight in each hand, elbows at shoulder level, palms facing forward. Arms to the side and slightly forward. Ask your physical therapist how much weight to use.     Raise one arm up until it s almost straight. Hold for a second. Lower the weight, extending the other arm up.    Repeat ____ times with each arm. Do ____ sets ____ times a day.  CAUTION: If you have shoulder problems, consult your healthcare provider before doing this exercise. Keep your head and body still during the exercise. Only your arms should move.   Date Last Reviewed: 11/1/2017 2000-2018 [employer] [address] [place] [address]. All rights reserved. This information is not intended as a substitute for professional medical care. Always follow your healthcare professional's instructions.           Patient Education     Shoulder Exercises: Side Raise    This exercise stretches and strengthens your shoulders. Before starting, read through all the instructions. During the exercise, breathe normally and use smooth movements. Stop if you feel any pain. If pain persists, call your healthcare provider.    Stand straight, holding a ____ pound weight in each hand, arms at sides, feet shoulder-width apart. Ask your physical therapist or healthcare provider how much weight to use.     Slowly extend your arms up and out until weights are at shoulder level. Slowly return to starting position.    Repeat ____ times. Do ____ sets ____ times a day.     CAUTION: Don t swing the weights or raise weights above shoulder level.   Date Last Reviewed: 11/1/2017 2000-2018 [employer] [address] [place] [address]  [place] [address]. All rights reserved. This information is not intended as a substitute for professional medical care. Always follow your healthcare professional's instructions.           Patient Education     Shoulder Exercises: Wall Pushup    Strengthening exercises help make your injured shoulder more stable by making the muscles that support your shoulder stronger. To warm up, do flexibility (stretching) exercises first.  Here are steps for the wall pushup:     With feet and hands shoulder-width apart, place your palms on the wall, standing about an arm s length away.    Keeping your knees straight and heels on the floor, bend your elbows and lean forward as far as you comfortably can. Your elbows should be pointing downward. Then push away from the wall to the starting position. Repeat.    Work up to 15 wall pushups.     Note: Wear shoes that keep you from slipping.   Date Last Reviewed: 12/1/2017 2000-2018 [employer]. [address] [place] [address]. All rights reserved. This information is not intended as a substitute for professional medical care. Always follow your healthcare professional's instructions.           Patient Education     Shoulder Exercises: Triceps Press    This exercise stretches and strengthens your shoulders. Before starting, read through all the instructions. During the exercise, breathe normally and use smooth movements. Stop if you feel any pain. If pain persists, call your healthcare provider.    Grasp a ___ pound  weight in each hand. Raise one arm overhead. Hold that arm close to your ear. Bend your elbow and lower the weight behind your head, as far as you can, being careful not to hit your head. Ask your physical therapist or healthcare provider how much weight to use.     Slowly straighten your elbow, extending your arm upward. Return to starting position.    Repeat ____ times with each arm. Do ____ sets ____ times a day.  CAUTION: Keep your  head still and neck straight. Keep your arm close to your ear. Don t arch your back.   Date Last Reviewed: 11/1/2017 2000-2018 [employer]. [address] [facility], [place] [address]. All rights reserved. This information is not intended as a substitute for professional medical care. Always follow your healthcare professional's instructions.           Patient Education     Exercising After a Heart Attack    Aerobic exercises improve endurance. They also help your heart, lungs, and blood vessels work better. They make you breathe harder and sweat a bit. Most of your workout should consist of aerobic exercises. Good choices include walking, biking, and swimming.  Strength exercises build muscle. They can also improve endurance. Examples include lifting weights and using resistance bands.  Stretching exercises increase flexibility, balance, and posture. Stretching is also a good way to reduce stress.  Safety    Follow the guidelines your healthcare provider or cardiac rehab team has set for you.    If nitroglycerine has been prescribed, keep it with you when exercising. Take as directed. Call 911 if you have chest pain that is not relieved by resting or taking nitroglycerine. Don't drive yourself to the emergency room. Call an ambulance or have someone else take you. It can be dangerous to drive if you are having a heart attack or after taking medicines that affect your blood pressure.    Report any changes in symptoms, such as pain or shortness of breath, to your healthcare provider or rehab staff right away.    Besides monitoring your symptoms, track your heart rate and blood pressure.    Slowly increase the level of exercise in a gradual manner over several weeks. Don't feel as if you have to push 100% from the start.    Don't exercise right after eating large meals in very hot or cold temperatures. These events can increase the stress to your heart.  Date Last Reviewed: 3/1/2018    2846-4468 [employer]. [address] [place] [address], [place] [address]. All rights reserved. This information is not intended as a substitute for professional medical care. Always follow your healthcare professional's instructions.

## 2019-04-05 NOTE — LETTER
[address] [address]  Phone: [phone]  Fax: [phone]    April 5, 2019        Main Carney  [address] [place] [address]          To whom it may concern:    RE: Main Carney    Patient was seen and treated today at our clinic. Patient was originally scheduled to come back to work on 04/12/19 from his medical health leave of absence. However, due to unforseen complications in his recovery process, please excuse Mr. Carney from his work duties until 04/26/19. Patient will likely need an additional 2-4 months to recover from his complications.    Please contact me for questions or concerns.      Sincerely,        Az Cain MD

## 2019-04-18 ENCOUNTER — OFFICE VISIT (OUTPATIENT)
Dept: FAMILY MEDICINE | Facility: CLINIC | Age: 64
End: 2019-04-18
Payer: COMMERCIAL

## 2019-04-18 VITALS
WEIGHT: 142.8 LBS | BODY MASS INDEX: 29.85 KG/M2 | RESPIRATION RATE: 20 BRPM | HEART RATE: 63 BPM | OXYGEN SATURATION: 98 % | DIASTOLIC BLOOD PRESSURE: 65 MMHG | TEMPERATURE: 97.9 F | SYSTOLIC BLOOD PRESSURE: 104 MMHG

## 2019-04-18 DIAGNOSIS — H02.401 PTOSIS OF EYELID, RIGHT: Primary | ICD-10-CM

## 2019-04-18 DIAGNOSIS — H49.01 3RD CRANIAL NERVE PALSY, RIGHT: ICD-10-CM

## 2019-04-18 DIAGNOSIS — I10 BENIGN ESSENTIAL HYPERTENSION: ICD-10-CM

## 2019-04-18 DIAGNOSIS — Z95.1 S/P CABG (CORONARY ARTERY BYPASS GRAFT): ICD-10-CM

## 2019-04-18 RX ORDER — ASPIRIN 81 MG/1
81 TABLET, CHEWABLE ORAL DAILY
Qty: 120 TABLET | Refills: 3 | Status: SHIPPED | OUTPATIENT
Start: 2019-04-18

## 2019-04-18 RX ORDER — ATORVASTATIN CALCIUM 80 MG/1
80 TABLET, FILM COATED ORAL AT BEDTIME
Qty: 120 TABLET | Refills: 3 | Status: SHIPPED | OUTPATIENT
Start: 2019-04-18

## 2019-04-18 RX ORDER — CLOPIDOGREL BISULFATE 75 MG/1
75 TABLET ORAL DAILY
Qty: 120 TABLET | Refills: 3 | Status: SHIPPED | OUTPATIENT
Start: 2019-04-18

## 2019-04-18 RX ORDER — LISINOPRIL 10 MG/1
10 TABLET ORAL DAILY
Qty: 120 TABLET | Refills: 1 | Status: SHIPPED | OUTPATIENT
Start: 2019-04-18

## 2019-04-18 RX ORDER — METOPROLOL TARTRATE 25 MG/1
12.5 TABLET, FILM COATED ORAL 2 TIMES DAILY
Qty: 120 TABLET | Refills: 3 | Status: SHIPPED | OUTPATIENT
Start: 2019-04-18 | End: 2019-04-24

## 2019-04-18 NOTE — PATIENT INSTRUCTIONS
Patient Education     Tswj Cov Roj (Cholesterol) Hauv Koj Cov Ntshav  Roj (cholesterol) yog ib co ua roj nplaum (waxy substance) uas khiav hauv koj cov ntshav. Thaum uas koj cov roj (cholesterol) ntau, nws yuav ua ib qho phab ntsa thaiv koj cov hlab ntsha. Qhov no ua kelsey cov hlab ntsha nqaim thiab cov ntshav yuav khiav tau tsawg. Li no jalen zaum koj lub plawv yuav nres (heart attack) los sis ua kelsey koj muaj mob sammy leeg (stroke).  Cov Roj (cholesterol) Zoo thiab Roj (cholesterol) Phem  Lipids yog cov pepper, thiab cov ntshav feem ntau yog dej. Pepper thiab dej tsis sib tov. Li no peb yuav tsum tau muaj lipoproteins (lipids uas ntim kelsey hauv cov plhaub protein) los nqa cov lipids. Lub plhaub protein ally cov lipoproteins nkag kelsey hauv cov ntshav, uas nqa cov lipids. Muaj ob renan lipoproteins:    LDL (lipoprotein uas tsawg) yog ib renan  roj (cholesterol) phem.  Qhov nws nqa tsuas yog roj (cholesterol) xwb. Nws xa cov roj (cholesterol) no mus kelsey cov menyuam qhov hauv lub cev (body cell). Yog tias muaj cov roj (cholesterol) LDL ntau dhau heev lawm, nws yuav txhaws tau cov hlab ntsha. Qhov no yuav ua tau kelsey kom muaj kab mob hauv lub plawv thiab muaj mob sammy leeg (stroke).    HDL (lipoprotein uas ntau) yog ib renan  roj (cholesterol) zoo.  Feem ntau nws muaj cov plhaub protein. Cov lipoprotein no marii cov roj (cholesterol) seem ntawm LDLs uas tau  kelsey hauv cov hlab ntsha. Li no yog tias muaj cov roj (cholesterol) HDL ntau yuav ua tau kom qhov uas koj yuav muaj kab mob hauv lub plawv thiab muaj mob sammy leeg (stroke) tsawg.  Tswj Cov Roj (cholesterol)  Cov roj (cholesterol) tag nrho muaj cov roj LDL thiab roj HDL, nrog kelsey lwm cov pepper hauv cov hlab ntsha. Yog tias tag nrho koj cov roj (cholesterol) siab heev, ua raws li cov theem ua nram qab no los pab txo koj cov roj (cholesterol) tag nrho kom nyob qis:    Noj Cov Khoom Uas Muaj Pepper Kom Tsawg:  ? Noj roj (uas hu tias hydrogenated) kom tsawg uas yog xaiv cov nqaij ntshiv,  khoom noj muaj roj tsawg, thiab siv jalen roj los ntawm txhiam laj txhiam xwm hloov pauv daniel siv roj tsiaj. Tsis txhob noj jalen khoom ncu fais rafia, noj nqaij tas li, thiab zaub mov kib heev heev. Jalen khoom noj uas muaj roj ntau yuav ua kelsey koj jalen cholesterol nce siab ntxiv. Daniel shirley tsis noj khoom uas muaj cholesterol nws kuj tseem tsis tau zoo txaus thiab.  ? Noj li ntawm 2 renan (servings) ntses txhua plua (week). Feem ntau ntses muaj omega-3 fatty acids. Cov no pab ua kom cov roj (cholesterol) hauv cov ntshav tsawg.  ? Noj cov khoom uas muaj whole grains thiab soluble fiber (xws li oat bran). Cov no yuav txo cov roj (cholesterol) tag nrho kom qis.    Nyob Kom Nquag Plias:  ? Xaiv ib yam daniel ua si uas koj nyiam ua. Taug daniel, ua luam dej, thiab caij tsheb kauj vab yog ib co daniel ua si uas yuav ua kom nquag plias.  ? Pib li qhov uas koj ua tau. Drake abbe ua kom ceev zog thiab ntev zog txhua plua (week).  ? Drake abbe pib siv zog renan khaj ib ce li 40 feeb nce txog daniel renan khaj uas siv siv zog yam tsawg kawg li 3 txog 4 hnub tau jib luong tiam kom lub cev muaj zog.  ? Nco ntsoov, ua ib co daniel ua si xwb zoo lupe qhov uas tsis ua li.  ? Yog tias koj tsis tau ua kom ib ce muaj zog tas li, drake abbe pib. Nrog koj tus kws mario mob quinn kom paub tseeb tias qhov no zoo kelsey koj.    Daniel txiav luam yeeb yuav ua tau kom koj cov lipid zoo. Nws tseem yuav txo tau qhov kom koj muaj kab mob hauv plawv thiab mob sammy leeg (stroke) tsawg.    Daniel tswj xyuas lub cev hnyav. Yog hais tias koj hnyav dhau lawm lossis pepper dhau lawm, erika koj tus neeg muab daniel saib xyuas yuav quinn nrog koj txhawm kelsey txo koj lub cev li daniel hnyav thiab txo koj li BMI (tus lej qhia txog qhov tsim nyog ntawm lub cev) kom rov zoo li qub lossis ze ze qhov ib txwm muaj. Daniel hloov khoom noj thiab renan khaj ntau ntxiv tuaj yeem pab tau.      Noj tshuaj raws li qhov tau qhia tseg. Muaj ntau tus neeg yuav tsum tau noj tshuaj kom txo tau lawv qhov LDL mus kelsey seem uas tsis phom sij. Cov  tshuaj noj kom txo tau cov roj (cholesterol) mas zoo heev thiab tsis muaj daniel phom sij. (Tiam sis noj tshuaj tsis yog ib qho yuav hloov tau kelsey qhov daniel ua si kom ib ce muaj zog los sis hloov qhov uas koj noj khoom!) Koj tus kws mario mob yuav qhia tau kelsey koj yeison cov tshuaj noj kom txo tau cov roj (cholesterol) puas yuav zoo kelsey koj.  Date Last Reviewed: 5/11/2015 2000-2018 [employer]. [address] [place] [address], [place] [address]. Txwv tsis pub yuam tas nrho cov alexandro kav. Cov ntsiab loren ntamw no tsis yog muab coj los ua ib yam hloov chaw kelsey txoj daniel mario mob nkeeg. Yuav tsum ua raws li koj tug kws mario mob cov loren taw qhia tas mus li.

## 2019-04-18 NOTE — PROGRESS NOTES
SUBJECTIVE       Main Carney is a 63 year old  male with a PMH significant for:     Patient Active Problem List   Diagnosis     HTN (hypertension)     Hyperlipidemia LDL goal <70     S/P CABG (coronary artery bypass graft)     He presents for follow-up of his cranial nerve III palsy and ptosis of his right eye.  Patient states that he is able to open his eye a little bit better, but not completely.  Describes that when he physically open his right eyelid, he sees double vision.  Denies any pain, headaches, fevers, lightheadedness, dizziness, chest pain, shortness of breath.    Patient's son states that he did meet with his dad's employer and was told that their HR department will send paperwork over to extend patient's medical leave. Our clinic has yet to receive these paperwork.     PMH, Medications and Allergies were reviewed and updated as needed.          OBJECTIVE     Vitals:    04/18/19 1402   BP: 104/65   Pulse: 63   Resp: 20   Temp: 97.9  F (36.6  C)   TempSrc: Oral   SpO2: 98%   Weight: 64.8 kg (142 lb 12.8 oz)     Body mass index is 29.85 kg/m .    Constitutional: Awake, alert, cooperative, no apparent distress, and appears stated age.  Eyes: Complete ptosis of right eyelid. CNIII palsy of right eye. PERRL, Left eye normal. Sclera clear, conjunctiva normal.   ENT: Normocephalic, without obvious abnormality, atramatic  Neck: Supple, symmetrical, trachea midline  Cardiovascular: Regular rate and rhythm, normal S1 and S2, no S3 or S4, and no murmur noted.      ASSESSMENT AND PLAN     1. Ptosis of eyelid, right  2. 3rd cranial nerve palsy, right  Ongoing. Mild improvement, but not cleared for work. May take an additional 4 months.  -will continue to monitor.  -Per ophthalmology and neurology, if no meaningful recovery 6 months after onset, symptoms may be permanent. Will explore neurosurgical options at that time.       RTC in 1 month for follow up of ptosis and CNIII palsy, or sooner if develops new  or worsening symptoms.    Az Cain  4/18/2019    Precepted with Dr. Jack

## 2019-04-23 ENCOUNTER — TELEPHONE (OUTPATIENT)
Dept: FAMILY MEDICINE | Facility: CLINIC | Age: 64
End: 2019-04-23

## 2019-04-23 DIAGNOSIS — Z95.1 S/P CABG (CORONARY ARTERY BYPASS GRAFT): ICD-10-CM

## 2019-04-23 NOTE — TELEPHONE ENCOUNTER
[facility] phone call message- patient requesting a refill:    Full Medication Name:     clopidogrel (PLAVIX) 75 MG tablet    metoprolol tartrate (LOPRESSOR) 25 MG tablet    Pharmacy confirmed as   [id number] - [place], [place] - [address]  [address]  [place] [address]  Phone: [phone] Fax: [phone]  : Yes    Additional Comments: They were only able to  some of the medications. Missing the two above. Please call and check with Pharmacy, They were told they only had the 3 they picked up.     OK to leave a message on voice mail? Yes    Primary language: Hmong      needed? Yes    Call taken on April 23, 2019 at 12:21 PM by Beverly Xiong

## 2019-04-24 RX ORDER — METOPROLOL TARTRATE 25 MG/1
12.5 TABLET, FILM COATED ORAL 2 TIMES DAILY
Qty: 120 TABLET | Refills: 3 | Status: SHIPPED | OUTPATIENT
Start: 2019-04-24

## 2021-06-02 VITALS — BODY MASS INDEX: 28.31 KG/M2 | WEIGHT: 139 LBS

## 2021-06-02 VITALS — WEIGHT: 143 LBS | BODY MASS INDEX: 27.02 KG/M2

## 2021-06-02 VITALS — HEIGHT: 60 IN | WEIGHT: 146 LBS | BODY MASS INDEX: 28.66 KG/M2

## 2021-06-02 VITALS — WEIGHT: 139.2 LBS | BODY MASS INDEX: 28.35 KG/M2

## 2021-06-02 VITALS
HEIGHT: 61 IN | WEIGHT: 139 LBS | BODY MASS INDEX: 26.24 KG/M2 | BODY MASS INDEX: 26.24 KG/M2 | WEIGHT: 139 LBS | HEIGHT: 61 IN | HEIGHT: 61 IN | WEIGHT: 139 LBS | BODY MASS INDEX: 26.24 KG/M2

## 2021-06-02 VITALS — WEIGHT: 142 LBS | BODY MASS INDEX: 26.83 KG/M2

## 2021-06-02 VITALS — BODY MASS INDEX: 25.49 KG/M2 | WEIGHT: 135 LBS | HEIGHT: 61 IN

## 2021-06-22 NOTE — PATIENT INSTRUCTIONS - HE
Below is a list of instructions we discussed today in clinic:   1. Your symptoms are likely due to blockages in the arteries that supply your heart with blood (coronary artery disease). This can be treated and needs to be further evaluated.  2. Start carvedilol 12.5 mg twice daily  3. Start atorvastatin 40 mg once daily  4. Continue to take aspirin daily  5. Schedule an echocardiogram and cardiac catheterization to evaluate your heart.  6. Follow up again with me in about 4-6 weeks.    You should receive a phone call from this office informing you of test or procedure results within 3 business days of the test being performed.  If you do not hear from our office with the test results within 1 week please do not hesitate to call asking for these results.     It was a pleasure to meet with you today in clinic.  Please do not hesitate to call the [facility] clinic with any questions or concerns at [phone].    Sincerely,     Torsten Herzog MD  Non-invasive Cardiology  Critical access hospital

## 2021-06-22 NOTE — PROGRESS NOTES
Main Carney  [address] [place] [address]  [phone] (home)      was present for this teach.    Primary cardiologist:  Dr. JEIMY Herzog  Procedure:  Coronary Angiogram Possible PCI  H&P completed by:  Dr. Herzog 1/3  Case MD:  Dr. Carl  Admit date and time:  1/10 9:30 am ECHO at 10 am  Case start time:  12:00  Ordering MD:  Dr. Herzog  Diagnosis:  Chest pain   Anticoagulation: None  CPAP: No  Bypass Grafts: No  Renal Issues: No  Allergies: No Known Allergies  Diabetic?: No  Device?: No    Angiogram Teaching    Reason for Visit:  Patient seen for pre-procedure education in preparation for: Coronary Angiogram Possible PCI    Procedure Prep:  Cardiologist note dated: 1/3  EKG results obtained, dated: Am of procedure  Hemogram results obtained: Am of procedure  Basic Metabolic Panel results obtained: Am of procedure  Lipid Profile results obtained: 1/3    Patient Education  Explained indications/risks for diagnostic evaluation, including one or more of the following:  left heart catheterization, right heart catheterization, coronary angiogram, less than 0.1% of acute myocardial infarction and CVA or death or any of the following to the degree that it could threaten life:  allergic reaction, arrhythmia, renal failure, hemorrhage, thrombosis and infection  Explained indications/risks for therapeutic interventions, including one or more of the following: PTCA, artherectomy, stent, intravascular ultrasound, 2% or less risk of MI, less than 1% risk of CVA, emergency heart surgery, death, less than 4 % risk of vascular injury requiring surgical repair or blood transfusion, 20-30% risk of restonosis with a bare metal stent, less than 10% risk of restonosis with a drug-eluting stent, 0.5-1% chance of stent thrombosis and may need clopidogrel (Plavix) form greater than 1 year.  These risks are in addition to baseline risks associated with a Diagnostic Evaluation.  Patient states understanding of  procedure and risks and agrees to proceed.    Pre-procedure instructions  Patient instructed to be NPO after midnight.  Patient instructed to arrange for transportation home following procedure.  Patient instructed to have a responsible adult with them for 24 hours post-procedure.  Post-procedure follow up process.  Conscious sedation discussed.  The patient was sent the pre-procedure letter (If requested) on 1/3    Pre-procedure medication instructions  Patient instructed on antiplatelet medication.  Continue medications as scheduled, with a small amount of water on the day of the procedure unless indicated.  Patient instructed to take 325 mg of Aspirin am of procedure: Yes  Other medication: instructed to hold mvi, supplements a.m. of the procedure.    *Order set was entered on this date: 1/3      Patient Active Problem List   Diagnosis     Angina, class III (H)     HTN (hypertension)       Current Outpatient Medications   Medication Sig Dispense Refill     acetaminophen (TYLENOL) 325 MG tablet Take 650 mg by mouth every 6 (six) hours as needed for pain.       aspirin 81 mg chewable tablet Chew 1 tablet (81 mg total) daily. 30 tablet 0     atorvastatin (LIPITOR) 40 MG tablet Take 1 tablet (40 mg total) by mouth at bedtime. 30 tablet 5     carvedilol (COREG) 12.5 MG tablet Take 1 tablet (12.5 mg total) by mouth 2 (two) times a day with meals. 60 tablet 5     No current facility-administered medications for this visit.        No Known Allergies    Patient verbalized understanding of the above teaching.    Natalie Schultz RN, BSN, PHN  Cardiovascular Nurse Clinician  [facility]  Direct: [phone]  [phone]  Email: [email or web address]

## 2021-06-23 NOTE — PROGRESS NOTES
Helped pt to ambulate but has difficulty walking due to gout flare up Unable to bear weight on his right leg. He did try in the room but he put more weight on his arms to support his weight while ambulating. Educated pt's wife on how to do incision care, pt's wife verbalized understanding. Pt denies pain once he is not moving. SCD in place.

## 2021-06-23 NOTE — PROGRESS NOTES
BG of 22 at 0219 was contaminated sample, BG rechecked with result of 134.  Insulin gtt continues to be held.

## 2021-06-23 NOTE — TREATMENT PLAN
RCAT Treatment Plan    Patient Score: 9    Patient Acuity: 2    Clinical Indication for Therapy: post extubation/CABG     Therapy Ordered: Flutter valve and IS (four times a day)    Assessment Summary: RCAT was done post extubation. Pt is on 3L NC with Sat 100%, BS were coarse/crackles. Pt scored an acuity level of 2. Plan to start Flutter Valve and IS for volume expansion and prevent atelectasis, also to instruct pt on deep breathing exercise for bronchial hygiene. Rt will re-eval within 48 hr.        01/11/19 7806   Reason for Assessment (RCAT)   RCAT Assesment Initial   Assessment Reason  Thoracic surgery   $ RCAT Eval Time 30 min.  Yes   Vitals (RCAT)   Heart Rate 85   /59   SpO2 100 %   Chart Assessment (RCAT)   Pulmonary Status  0   Surgical Status  3   Chest Xray  0   Patient Assessment (RCAT)    Respiratory Pattern  0   Mental Status  0   Breath Sounds  3   Cough Effectiveness  2   Level of Activity  0   O2 Required SpO2 >= 92%  1   Chart + Pt. Assessment Total Points  9   RCAT Acuity Score 9 (Acuity 2)   Clinical Indications (RCAT)   Broncho-Pulmonary Therapy Pt. unable to deep breath and cough spontaneously   Volume Expansion Therapy Prevent atelectasis   RCAT Order Placed  Continue current therapy     JUAN Encinas  1/11/2019

## 2021-06-23 NOTE — PROGRESS NOTES
CVTS Daily Progress Note   1/17/2019   POD # 6 s/p CABG x 5  DOS 1/11/2019 Dr. Otto  EF 63 %  A1C 5.8    Patient arrived to ICU from OR 1/11 afternoon. He was subsequently extubated and weaned from pressors. Transferred out of ICU on POD #1    Overnight events:  Cancel discharge home today, keep in for observation.  Cardiology consult to evaluate rhythm.     This afternoon had SR rate of 35-42 with chest discomfort.     HR varies 35-67, with SB to SR  With PAT short burst.  Chest discomfort improved with O2 and as HR recovered.    EKG - marked SB with RBB  Up in chair, alert      gout flair - right ankle - significant improvement in discomfort    Leukocytosis - Continues with downward trend.     Patient on path from surgical standpoint, but rehab impaired due to painful walking.      Maintaining oxygen saturations on RA   Normotensive   Pain  controlled. +BM /+ flatus.   Hgb 8.0.     Patient main concern: not feeling ready for discharge home today, thinks he maybe ready tomorrow.    Patient denies new chest pain, shortness of breath, abdominal pain, calf pain, nausea.  Visit was done with professional .      PLAN  Continue diuresis with oral lasix  Keep in house for rhythm assessment and management per cardiology    Home in Sat if stable    Follow up  CV surg  Feb 5 with [facility]  Cards  Wednesday Feb 27 with Dr. JEIMY Herzog    OBJECTIVE:    Temp:  [97.8  F (36.6  C)-98.7  F (37.1  C)] 98.3  F (36.8  C)  Heart Rate:  [39-68] 39  Resp:  [16-18] 16  BP: ()/(58-69) 105/60  Wt Readings from Last 2 Encounters:   01/17/19 0500 144 lb 14.4 oz (65.7 kg)   01/16/19 0451 142 lb 12.8 oz (64.8 kg)   01/15/19 0610 146 lb 4.8 oz (66.4 kg)   01/14/19 0611 135 lb 9.6 oz (61.5 kg)   01/13/19 0530 147 lb 1.6 oz (66.7 kg)   01/12/19 0556 151 lb 6.4 oz (68.7 kg)   01/11/19 0630 141 lb 9.6 oz (64.2 kg)   01/10/19 1700 143 lb (64.9 kg)   01/10/19 0955 143 lb (64.9 kg)   01/03/19 1114 146 lb (66.2 kg)       Current  Medications:    Scheduled Meds:    acetaminophen  650 mg Oral Q6H    Or     acetaminophen  650 mg Rectal Q6H     ascorbic acid (vitamin C)  500 mg Oral Daily with lunch     aspirin  81 mg Oral DAILY     atorvastatin  80 mg Oral QHS     atropine  1 mg Intravenous Once     bisacodyl  10 mg Rectal Once     clopidogrel  75 mg Oral DAILY     colchicine  0.6 mg Oral BID     docusate sodium  100 mg Oral BID     ferrous sulfate  325 mg Oral BID with meals     furosemide  40 mg Oral BID - diuretic     heparin (PF)  5,000 Units Subcutaneous Q8H FIXED TIMES     insulin aspart (NovoLOG) injection   Subcutaneous TID with meals     insulin aspart (NovoLOG) injection   Subcutaneous QHS     lidocaine  1 patch Transdermal DAILY     melatonin  3 mg Oral QHS     omeprazole  20 mg Oral QAM AC     polyethylene glycol  17 g Oral DAILY     potassium chloride ER  20 mEq Oral BID     [START ON 1/18/2019] predniSONE  20 mg Oral Daily with brkfst    Followed by     [START ON 1/21/2019] predniSONE  15 mg Oral Daily with brkfst    Followed by     [START ON 1/24/2019] predniSONE  10 mg Oral Daily with brkfst    Followed by     [START ON 1/27/2019] predniSONE  5 mg Oral Daily with brkfst     predniSONE  40 mg Oral DAILY     sodium chloride bacteriostatic  1-5 mL Intradermal Once     sodium chloride  10 mL Intravenous Q8H FIXED TIMES       PRN Meds:.acetaminophen, acetaminophen, albumin human, aluminum-magnesium hydroxide-simethicone, bisacodyl, bisacodyl, dextrose 50 % (D50W), glucagon (human recombinant), magnesium hydroxide, naloxone **OR** naloxone, ondansetron, sodium chloride, sodium chloride, sodium phosphates 133 mL, traMADol, traZODone    Cardiographics:    Telemetry monitoring demonstrates SR with rates in the 70s per my personal review.  Currently HR varies between 34 -67 - SR  Imaging:    No results found.    Lab Results (most recent, reviewed):    Hemoglobin (g/dL)   Date Value   01/16/2019 8.0 (L)     WBC (thou/uL)   Date Value    01/16/2019 16.6 (H)     Potassium (mmol/L)   Date Value   01/17/2019 3.8     Creatinine (mg/dL)   Date Value   01/15/2019 0.73     Hemoglobin A1c (%)   Date Value   01/10/2019 5.8     Magnesium (mg/dL)   Date Value   01/16/2019 2.3     Calcium (mg/dL)   Date Value   01/15/2019 8.4 (L)     144 lb 14.4 oz (65.7 kg)  Admit weight  64.8 kg     Physical Exam:    General: Patient seen in Chair. NAD  Much more awake and conversant today.  Professional     Wife in room, her questions address also.    CV: RRR on monitor.   Pulm: Non-labored effort on RA   Incision  C/D/I.  Abd: Soft, NT, ND +BM  : Voiding   Ext: Mild pedal edema, SCDs in place, warm- less pain right ankle  Neuro: CNs grossly intact.       ASSESSMENT/PLAN:    Main Carney is a 63 y.o. male with a history of HTN who is now s/p CABG x 5 .    Principal Problem:    Angina, class III (H)  Active Problems:    Unstable angina (H)    Hyperlipidemia LDL goal <70    S/P CABG (coronary artery bypass graft)    On mechanically assisted ventilation (H)      NEURO:   - Scheduled Tylenol and PRN Tramadol for pain  - Oxycodone made patient too sleepy  - Melatonin QHS    CV:   - Normotensive  - Coreg discontinued   - Lasix 40 po 2 times a day  - Kdur 20 two times a day  -- ASA 81mg  - Atorvastatin 80mg daily    PULM:   - Maintaining oxygen saturations on RA  - Encourage pulmonary toilet-pulling 500-750 on IS    FEN/GI:  - Continue electrolyte replacement protocol  -K+ supplement   - Diet: Cardiac, ADAT   - Bowel regimen +BM    RENAL:  - Adequate UOP/hr.   - Cr 0.73    HEME:  - Acute blood loss anemia post-op -   - stable hgb 8.3  - Supplemental Vit C and fe   - Hep SQ, [id number]  - Leukocytosis - 16.6  down from 19.6    ID:  - Lisa op ppx complete, afebrile  -  No concerns for infection    ENDO:   -SSI  -Gout - Cholchicine    - Prednisone taper.  PPx:   - DVT: SCDs, SQ heparin three times a day   - GI: Omeprazole  - Ambulating with therapy  CR - difficulty  standing with painful ankle     DISPO:   -telemetry floor   Home in Saturday  if stable.

## 2021-06-23 NOTE — PROCEDURES
Midline Insertion Procedure Note  Pt. Name: Main Carney  MRN:        [id number]      Procedure: Insertion of 5fr Dual Lumen BioFlo Midline Catheter, Lot number [id number]    Indications: vascular access    Procedure Details   Patient identified with 2 identifiers.  Contraindications : none noted.     Maximum Barrier line insertion bundle followed: hand hygeine performed prior to procedure, site cleansed with cholraprep, hat, mask, sterile gloves,sterile gown worn, patient draped with maximum barrier head to toe drape, sterile field maintained.    The vein was assessed and found to be compressible and of adequate size. 3 ml 1% Lidocaine administered sq to the insertion site. 5fr midline catheter inserted into the brachial vein of the left arm with ultrasound guidance. One attempts required to access vein.   Catheter threaded without difficulty. Good blood return noted.    Modified Seldinger Technique used for insertion.    Catheter secured with Statlock, biopatch and Tegaderm dressing applied.    Findings:  Total catheter length  14 cm, with 0 cm exposed. Mid upper arm circumference is 32 cm. Catheter was flushed with 20 cc NS. Patient  tolerated procedure well.      Patient's primary RN notified midline is ready for use.    Comments:      A midline catheter is a form of peripheral venous access. Not recommended for the infusion of vesicants (Vancomycin, Vasopressors, TPN, etc.)    Joseph Joe RN

## 2021-06-23 NOTE — PLAN OF CARE
Problem: Discharge Barriers  Goal: Patient's discharge needs are met  Outcome: Progressing  Care Plan  Care Management    Care Management Goals of the Day: Follow progression of care    Care Progression Reviewed With: Charge RN, MD, HUC, CMSW    Barriers to Discharge: POD 2, CABG x5    Discharge Disposition: Home    Expected Discharge Date: 1/14/19    Transportation: Family    Care Coordination Narrative: Pt lives at home, is independent at baseline. He plans to return home at [place] with family to transport. CM to assist with CR recommendations.

## 2021-06-23 NOTE — PLAN OF CARE
Problem: Discharge Barriers  Goal: Patient's discharge needs are met  Outcome: Progressing  Care Plan  Care Management      Care Management Goals of the Day: Assessment    Care Progression Reviewed With: Charge RN, MD, HUC, CMSW    Barriers to Discharge: CABG    Discharge Disposition: Likely home, TBD after surgery    Expected Discharge Date: TBD    Transportation: Family, anticipated      Care Coordination Narrative: Writer met with pt just before he went to surgery. An  was present.  When writer inquired about a HCD pt stated his sons, Ari and Chandler, are his contacts and decision makers if he would need decision makers. He does not have a health care directive. Pt would like to return home at [place]. He lives with his wife and family. He worked prior to this admission and is indepenent at baseline.

## 2021-06-23 NOTE — PROGRESS NOTES
Patient Name: Main Carney   MRN: [id number]   Date of Admission: 1/10/2019    Procedure: CORONARY ARTERY BYPASS x 5, LEFT ENDOSCOPIC VEIN HARVEST, INTERNAL MAMMARY ARTERY, EPIAORTIC ULTRASOUND, ANESTHESIA TRANSESOPHAGEAL ECHOCARDIOGRAM    Post Op day #:4    Subjective (Patient focus/Primary Problem for shift): Pain management and prepare for d/c          Pain Goal3 Pain Rating2/10           Pain Medication/ Regime effective to reduce patient pain Scheduled Tylenol    Objective (Physical assessment):           Rhythm: normal sinus rhythm w/ BBB            Bowel Activity: no if Yes indicate when:           Bowel Medications: yes            Incision: healing well          Incentive Spirometry Q 1-2 hour when awake:  yes Volume: 750          Epicardial Pacing Wires:  no            Patient Activity:           Up to chair for meals: not applicable          Ambulation with RN x2 (Not including CR): no            Is patient in home clothes:no             Chest Tubes   Pleural: not applicable Draining: not applicable               Suction: not applicable              Mediastinal: not applicable Draining: not applicable               Suction: not applicable   Dressing Change Daily:not applicable If No, why?                     Urinary Catheter: not applicable           Preventative WOC consult (need MD order): not applicable       Assessment (Nursing primary shift focus): Pain management and prepare for d/c    Plan (Patient Care Plan/focus): Prepare for d/c and improve strength      Carmine Nelson   1/15/2019   5:16 AM

## 2021-06-23 NOTE — PLAN OF CARE
Daily Care      Daily care needs are met Progressing          Pain      Patient's pain/discomfort is manageable Progressing          Potential for Falls      Patient will remain free of falls Progressing          Safety      Patient will be injury free during hospitalization Progressing          Pt is A&Ox4. Tele: NSR with BBB. Lung sounds clear on RA. Right radial site is CDI, soft, non-tender, and no drainage noted with band-aid. Pt NPO for CABG today. Pt showered, clipped, and scrubbed. Will continue to monitor.    Guadalupe Angel RN

## 2021-06-23 NOTE — PROGRESS NOTES
CVTS Daily Progress Note   1/13/2019   POD #2 s/p CABG x 5  DOS 1/11/2019 Dr. Otto  EF 63 %  A1C 5.8    Patient arrived to ICU from OR 1/11 afternoon. He was subsequently extubated and weaned from pressors.     Overnight events:  No acute events overnight.   Patient on path doing well.      Maintaining oxygen saturations on RA   Normotensive on no pressors.    Pain  controlled. -BM /+ flatus.     Tolerating diet clear liquids.   UOP adequate.   Chest tube output appropriate.   Hgb 8.4.   Patient denies new chest pain, shortness of breath, abdominal pain, calf pain, nausea.     Patient has no questions today.  IV access - mid line placed yesterday    PLAN  Continue diuresis  PW and CT's removed    Keep garces and CT another day    OBJECTIVE:    Temp:  [98  F (36.7  C)-99.5  F (37.5  C)] 98  F (36.7  C)  Heart Rate:  [63-73] 73  Resp:  [16-26] 17  BP: (110-124)/(57-75) 118/57  Wt Readings from Last 2 Encounters:   01/13/19 0530 147 lb 1.6 oz (66.7 kg)   01/12/19 0556 151 lb 6.4 oz (68.7 kg)   01/11/19 0630 141 lb 9.6 oz (64.2 kg)   01/10/19 1700 143 lb (64.9 kg)   01/10/19 0955 143 lb (64.9 kg)   01/03/19 1114 146 lb (66.2 kg)       Current Medications:    Scheduled Meds:    acetaminophen  650 mg Oral Q6H    Or     acetaminophen  650 mg Rectal Q6H     ascorbic acid (vitamin C)  500 mg Oral Daily with lunch     aspirin  81 mg Oral DAILY     atorvastatin  80 mg Oral QHS     [START ON 1/14/2019] bisacodyl  10 mg Rectal Once     chlorhexidine  15 mL Topical Q12H 09-21     docusate sodium  100 mg Oral BID     ferrous sulfate  325 mg Oral BID with meals     furosemide  40 mg Intravenous TID     heparin (PF)  5,000 Units Subcutaneous Q8H FIXED TIMES     insulin aspart (NovoLOG) injection   Subcutaneous TID with meals     insulin aspart (NovoLOG) injection   Subcutaneous QHS     lidocaine  1 patch Transdermal DAILY     magnesium hydroxide  30 mL Oral DAILY     melatonin  3 mg Oral QHS     metoprolol tartrate  12.5 mg Oral  BID     omeprazole  20 mg Oral QAM AC     polyethylene glycol  17 g Oral DAILY     potassium chloride ER  20 mEq Oral BID     sodium chloride bacteriostatic  1-5 mL Intradermal Once     sodium chloride  10 mL Intravenous Q8H FIXED TIMES     Continuous Infusions:    PRN Meds:.acetaminophen, acetaminophen, albumin human, aluminum-magnesium hydroxide-simethicone, bisacodyl, [START ON 1/14/2019] bisacodyl, dextrose 50 % (D50W), glucagon (human recombinant), magnesium hydroxide, naloxone **OR** naloxone, ondansetron, sodium chloride, sodium chloride, sodium phosphates 133 mL, traMADol, traZODone    Cardiographics:    Telemetry monitoring demonstrates SR with rates in the 70s per my personal review.    Imaging:    No results found.    Lab Results (most recent, reviewed):    Hemoglobin (g/dL)   Date Value   01/13/2019 8.4 (L)     WBC (thou/uL)   Date Value   01/12/2019 11.0     Potassium (mmol/L)   Date Value   01/13/2019 3.9     Creatinine (mg/dL)   Date Value   01/13/2019 0.77     Hemoglobin A1c (%)   Date Value   01/10/2019 5.8     Magnesium (mg/dL)   Date Value   01/13/2019 2.3     Calcium (mg/dL)   Date Value   01/13/2019 8.7       147 lb 1.6 oz (66.7 kg)  Admit weight  64.8 kg     UOP: 1.6L/24 hours  CT: 410 cc/24     Physical Exam:    General: Patient seen in Chair. NAD  Professional  available and interpreting for complete visit while garces and PW and CT removed. Wife in room, her questions address also.  CV: RRR on monitor.   Pulm: Non-labored effort on RA   Incision  C/D/I.  Abd: Soft, NT, ND +flatus, BS+  : Voiding   Ext: Mild pedal edema, SCDs in place, warm  Neuro: CNs grossly intact.       ASSESSMENT/PLAN:    Main Carney is a 63 y.o. male with a history of HTN who is now s/p CABG x 5 .    Principal Problem:    Angina, class III (H)  Active Problems:    Unstable angina (H)    Hyperlipidemia LDL goal <70    S/P CABG (coronary artery bypass graft)    On mechanically assisted ventilation  (H)      NEURO:   - Scheduled Tylenol and PRN Tramadol for pain  - Oxycodone made patient too sleepy  - Melatonin QHS    CV:   - Normotensive  - Metoprolol 12.5 mg -HR at goal 70s   - Lasix 40 three times a day  - Kdur 20 two times a day  -- ASA 81mg  - Atorvastatin 80mg daily  - Chest tubes and PW removed    PULM:   - Vent weaning as able  - Maintaining oxygen saturations on RA  - Encourage pulmonary toilet-pulling 500-750 on IS  - instructed with  help today     FEN/GI:  - Continue electrolyte replacement protocol  -K+ supplement   - Diet: Cardiac, ADAT   - Bowel regimen    RENAL:  - Adequate UOP/hr.   - Cr 0.77  -has voided post cath removal  - Diuresis with 40mg IV Lasix three times a day    HEME:  - Acute blood loss anemia post-op -   - stable hgb 8.4  - Supplemental Vit C and fe   - Hgb stable, no bleeding concerns.   - Hep SQ, [id number]    ID:  - Lisa op ppx complete, afebrile  -  No concerns for infection    ENDO:   -SSI  PPx:   - DVT: SCDs, SQ heparin three times a day   - GI: Omeprazole  - Ambulating with therapy  CR -100ft    DISPO:   - Transfer to general telemetry floor

## 2021-06-23 NOTE — PROGRESS NOTES
Respiratory Therapy Note:  Cab x5 POD 1.  Denies shortness of breath.  Respiratory effort normal, shallow breathing.  Titrated off O2.  Poor inspiratory effort with IS to 500 mL.  Consider EZPAP.  Aerobika for secretion clearance, weak nonproductive cough.  Plan is to continue with scheduled respiratory therapy per protocol.  JUAN Lou, 1/12/2019

## 2021-06-23 NOTE — PROGRESS NOTES
Patient Name: Main Carney   MRN: [id number]   Date of Admission: 1/10/2019    Procedure: CORONARY ARTERY BYPASS x 5, LEFT ENDOSCOPIC VEIN HARVEST, INTERNAL MAMMARY ARTERY, EPIAORTIC ULTRASOUND, ANESTHESIA TRANSESOPHAGEAL ECHOCARDIOGRAM    Post Op day #: 8    Subjective (Patient focus/Primary Problem for shift):  Watch rhythm, continue IV lasix, and encourage activity.          Pain Goal 0-3  Pain Rating 0          Pain Medication/ Regime effective to reduce patient pain scheduled tylenol    Objective (Physical assessment):           Rhythm: normal sinus rhythm; Afib 130s (short bursts-see other note), Afib 60-70, SR with bigemincy PVCs            Bowel Activity: yes if Yes indicate when:  today          Bowel Medications: yes            Incision: healing well          Incentive Spirometry Q 1-2 hour when awake:  yes Volume:  6995-4245          Epicardial Pacing Wires:  no            Patient Activity:           Up to chair for meals: yes          Ambulation with RN x2 (Not including CR): yes            Is patient in home clothes:no             Chest Tubes   Pleural: no Draining: not applicable               Suction: not applicable              Mediastinal: no Draining: not applicable               Suction: not applicable   Dressing Change Daily:not applicable If No, why?  CT sites DAMIÁN                   Urinary Catheter: no           Preventative WOC consult (need MD order): no       Assessment (Nursing primary shift focus):  Pt doing well. He and family( that knows English well) watched going home after heart surgery video in English.  (THe Hmong one is being fixed).  I talked with him afterwards with .  He understood content, and also said they have seen it 3 times now.    Plan (Patient Care Plan/focus):  Encourage activity, IS, and plan for DC soon. Monitor rhythm.      Danita James   1/18/2019   8:11 PM

## 2021-06-23 NOTE — PROGRESS NOTES
Pt still c/o significant pain in R knee with activity.  He does say pain lessened after colchicine given.    Notified Juancho Brink, he said it will take some time and a few more doses of colchicine to help bring inflammation causing pain down.  Juancho Brink did not want to change tramadol order from q6hrs to q4hrs due to pt's drowsiness.

## 2021-06-23 NOTE — BRIEF OP NOTE
Brief Operative Note    Name:  Main Carney  Location: [facility] Main OR  Procedure Date:  1/11/2019  PCP:  Provider, No Primary Care      CORONARY ARTERY BYPASS x 5, LEFT ENDOSCOPIC VEIN HARVEST, INTERNAL MAMMARY ARTERY, EPIAORTIC ULTRASOUND, ANESTHESIA TRANSESOPHAGEAL ECHOCARDIOGRAM (N/A)    Pre-Procedure Diagnosis:    CAD (coronary artery disease) [I25.10]     Post-Procedure Diagnosis:    Same    Surgeon(s):  Jazmín Otto MD    Findings:    Small targets    Estimated Blood Loss:   500 mL from 1/11/2019 11:03 AM to 1/11/2019  4:54 PM    Specimens: None       Drains:   Chest Tube 1 Mediastinal 32 Fr. (Active)       Chest Tube 2 Mediastinal 24 Fr. (Active)       Urethral Catheter Latex;Temperature probe 16 Fr. (Active)       Implants:  Implant Name Type Inv. Item Serial No.  Lot No. LRB No. Used Action   LEAD ARTIRIAL PACING TEMPORARY 53CM [id number] - [id number] Lead LEAD ARTIRIAL PACING TEMPORARY 53CM [id number]  [employer] [id number] N/A 3 Implanted   PLEDGET PTFE SOFT PREMIPATCH 9X5MM [id number] - [id number] Graft PLEDGET PTFE SOFT PREMIPATCH 9X5MM [id number]  [employer] [id number] N/A 2 Implanted   WIRE STERNAL SUTURE SINGLE #6 046-032 - [id number] Metallic Hardware/[place] WIRE STERNAL SUTURE SINGLE #6 046-032  [employer] [id number] N/A 2 Implanted       Complications:  None    Jazmín Otto     Date: 1/11/2019  Time: 5:14 PM

## 2021-06-23 NOTE — ANESTHESIA CARE TRANSFER NOTE
Last vitals:   Vitals:    01/11/19 0853   BP: 141/85   Pulse: 66   Resp: 18   Temp: 36.7  C (98.1  F)   SpO2: 96%     Patient's level of consciousness is unresponsive  Spontaneous respirations: no: VENT  Maintains airway independently: no: VENT  Dentition unchanged: yes  Oropharynx: oropharynx clear of all foreign objects    QCDR Measures:  ASA# 20 - Surgical No Data Recorded  PQRS# 430 - Adult PONV Prevention: 4558F-8P - Pt did NOT receive => 2 anti-emetic agents  ASA# 8 - Peds PONV Prevention: 4558F-8P - Pt did NOT receive => 2 antiemetic agents  PQRS# 424 - Lisa-op Temp Management: 4559F - At least one body temp DOCUMENTED => 35.5C or 95.9F within required timeframe  PQRS# 426 - PACU Transfer Protocol: - Transfer of care checklist used  ASA# 14 - Acute Post-op Pain: ASA14B - Patient did NOT experience pain >= 7 out of 10

## 2021-06-23 NOTE — PROGRESS NOTES
Discussed rhythm changes with Dr Estes.  MD said to continue to monitor, have lifepak and pads ready.  Pt still asymptomatic and resting comfortably.

## 2021-06-23 NOTE — PLAN OF CARE
Pt had 2 short runs of Afib RVR HR 130s. He did feel heart race.  Now he is Afib 60s-70s.   Radial pulse 60, apical pulse 60 at 13:45.  Other VSS. /66.  Notified URBANO Donohue with CV surgery. No new orders. Continue to monitor, and notify CV surgery if pt is in sustained Afib RVR.

## 2021-06-23 NOTE — ANESTHESIA PROCEDURE NOTES
LENNIE    Patient location during procedure: OR  Start time: 1/11/2019 12:30 PM  Staffing:  Performing  Anesthesiologist: Octavia Boo MD  LENNIE:  Type/Reason: Diagnostic LENNIE probe placement only  Technique: blind insertion  Difficulty: difficult

## 2021-06-23 NOTE — PLAN OF CARE
Patient Name: Main Carney   MRN: [id number]   Date of Admission: 1/10/2019    Procedure: CORONARY ARTERY BYPASS x 5, LEFT ENDOSCOPIC VEIN HARVEST, INTERNAL MAMMARY ARTERY, EPIAORTIC ULTRASOUND, ANESTHESIA TRANSESOPHAGEAL ECHOCARDIOGRAM    Post Op day #:2    Subjective (Patient focus/Primary Problem for shift): no pain, regain strength            Pain Goal 0 Pain Rating 6           Pain Medication/ Regime effective to reduce patient pain scheduled tylenol     Objective (Physical assessment):           Rhythm: normal sinus rhythm w/ BBB, has intermittent burst/runs of afib, but maintains NSR.             Bowel Activity: no if Yes indicate when:           Bowel Medications: yes            Incision: healing well          Incentive Spirometry Q 1-2 hour when awake:  yes Volume: 500          Epicardial Pacing Wires:  no            Patient Activity:           Up to chair for meals: yes          Ambulation with RN x2 (Not including CR): yes            Is patient in home clothes:no             Chest Tubes   Pleural: no Draining: no               Suction: no              Mediastinal: no Draining: no               Suction: no   Dressing Change Daily:yes If No, why?                     Urinary Catheter: no           Preventative WOC consult (need MD order): not applicable       Assessment (Nursing primary shift focus): pt is hmong speaking. Still sleeping most of my shift, but was more awake today. C/o incisional pain and R knee pain (new? Baseline? Hard to assess). Found relief w/ scheduled tylenol. PRN tramadol available. Clear/dim lung sounds. On RA. ISAAC, labored breathing when ambulating. Gets IS up to 500 only. Using flutter valve. Family assists pt in making sure he uses IS Q1hr.  NSR w/ BBB in 60-70's. Monitor tech noticed that pt was having bursts/runs of Afib, but continues to maintain NSR. Will leave a sticky note for the CV team. Positive bowel sounds, passing gas, but no BM yet. Received PRN bowel meds. Voiding  adequately. On IV lasix. +2 edema LLE, +1 RLE. Up w/ Ast 1-2 w/ walker. Pt ambulated in hallway today, walked only 50ft, < than yesterday. Was almost limping on his R foot. Will leave a sticky note on that for the CV team as well. May need more extensive cardiac rehab outpt or TCU cares.     Plan (Patient Care Plan/focus): monitor rhythms, manage pain, increase IS use, increase activity, regain strength, continue post-op education, shower in am.      Jf Lopez   1/13/2019   6:49 PM

## 2021-06-23 NOTE — PLAN OF CARE
4000 called report and RN to room with pt for hand off. To room 4030/ W/C , personal items with pt. Family with pt to new room.

## 2021-06-23 NOTE — PROGRESS NOTES
[facility] Daily Progress Note    Assessment & Plan: Main Carney is a 63 y.o. male with a past medical history of HTN, unstable angina admitted for severe multivessal coronary artery disease and plan for CABG.    Principal Problem:    Angina, class III (H)  Active Problems:    Unstable angina (H)    Hyperlipidemia LDL goal <70      Severe multivessel CAD  Unstable angina: History of progressive angina with dyspnea on exertion. EKG shows first degree AV block, RBBB and Q waves in the inferior leads. 1/1019 cardiac cath with severe multivessel disease. ECHO shows normal EF of 63% with no WMA or valvular disease. Carotid US without significant stenosis. Patient was evaluated by CV surgery and deemed a good candidate for surgery. CABG scheduled for 1/1/19 at 1100. No active CP or other concerning symptoms at this time.   - ASA 81mg daily  - Atorvastatin 40 mg  - Carvedilol 12.5 mg BID  - NPO for surgery  - CV surgery and cardiology following     HTN: Blood pressures have been borderline elevated. Pt states he has been on BP meds in the past, but does not recall the names. He is also intermittently bradycardic, likely secondary to beta blocker.   - Hydralazine 10 mg IV prn for SBP >180, DBP >100  - Carvedilol as above.         Diet: NPO, cardiac diet following   Fluids: No fluids   VTE Prophylaxis: None at this time    Discharge Planning discussed with patient  Barriers to discharge: pending CABG   Anticipated discharge day: 1/14/19  Disposition:  SNF  Status: inpatient .admit  Length of Stay: 1     Patient discussed with attending [facility] physician, Dr.Manuel Perez, who agrees with the plan.     Prakash Wills PGY3 1/11/2019  [facility]   Pager: [phone] (from 8AM-5:30PM)    This is a [facility] Patient.  Please call the senior pager with any questions or concerns, 24/7.  [phone]    Subjective/Interval events:  Feeling well at this time, no pain at  rest.  No shortness of breath, abdominal pain, nausea, or vomiting at this time. No history of lower extremity swelling. Patient has no questions about surgery today.  He did request that a person of  descent be in the car for the surgery, I could not guarantee this for the patient.  He has no other concerns.     At baseline, patient able to walk upstairs without any chest pain.  He only has chest pain shortness of breath with moderate to intense exertion.    ROS: Negative unless noted above.    Objective  Vital signs in last 24 hours  Temp:  [98  F (36.7  C)-99.1  F (37.3  C)] 99.1  F (37.3  C)  Heart Rate:  [55-69] 64  Resp:  [15-24] 18  BP: (124-167)/(64-82) 134/65  141 lb 9.6 oz (64.2 kg)  Physical Exam  General appearance: alert, appears stated age, cooperative and no distress  Head: Normocephalic, without obvious abnormality, atraumatic  Eyes: conjunctivae/corneas clear.  EOM's intact.  Neck: no adenopathy, no carotid bruit, supple, symmetrical, trachea midline and thyroid not enlarged, symmetric, no tenderness/mass/nodules  Lungs: clear to auscultation bilaterally  Heart: regular rate and rhythm, S1, S2 normal, no murmur, click, rub or gallop  Abdomen: soft, non-tender; bowel sounds normal; no masses,  no organomegaly  Extremities: extremities normal, atraumatic, no cyanosis or edema  Pulses: 2+ and symmetric  Skin: Skin color, texture, turgor normal. No rashes or lesions    Intake/Output last 3 shifts  I/O last 3 completed shifts:  In: 850 [P.O.:350; I.V.:500]  Out: -   Pertinent Labs   Results from last 7 days   Lab Units 01/10/19  1010   LN-WHITE BLOOD CELL COUNT thou/uL 11.1*   LN-HEMOGLOBIN g/dL 14.5   LN-HEMATOCRIT % 41.5   LN-PLATELET COUNT thou/uL 368     Results from last 7 days   Lab Units 01/11/19  0547 01/10/19  1010   LN-SODIUM mmol/L  --  139   LN-POTASSIUM mmol/L 3.4* 4.0   LN-CHLORIDE mmol/L  --  108*   LN-CO2 mmol/L  --  24   LN-BLOOD UREA NITROGEN mg/dL  --  16   LN-CREATININE mg/dL  --   0.77   LN-CALCIUM mg/dL  --  9.6     Results from last 7 days   Lab Units 01/10/19  1716   LN-INR  1.08     Pertinent Radiology    Xr Chest 2 Views    Result Date: 1/10/2019  XR CHEST 2 VIEWS 1/10/2019 4:34 PM INDICATION: Pre op cabg COMPARISON: Chest x-ray 12/26/2018 FINDINGS: No pneumothorax. Mild pulmonary hyperinflation. The lungs are clear and there are no pleural effusions. Stable upper normal heart size. No overt vascular congestion.    Xr Chest 2 Views    Result Date: 12/26/2018  XR CHEST 2 VIEWS 12/26/2018 2:45 PM INDICATION: Sob, harmon COMPARISON: None. FINDINGS: Trace right pleural effusion or pleural scar. Heart size appears normal. Lungs appear clear.    Us Carotid Bilateral    Result Date: 1/10/2019  US CAROTID BILATERAL 1/10/2019 4:31 PM INDICATION: Pre op evaluation. TECHNIQUE: Duplex exam performed utilizing 2D gray-scale imaging, Doppler interrogation with color-flow and spectral waveform analysis. COMPARISON: None. FINDINGS: RIGHT: There is a moderate amount of atheromatous plaque. Normal waveforms with no significant stenosis. LEFT: There is a moderate amount of atheromatous plaque. Normal waveforms with no significant stenosis. Both vertebral arteries and subclavian artery waveforms are normal. VELOCITY CHART: The following velocities were obtained in the RIGHT carotid system. CCA: 71/12 cm/s ICA: 79/25 cm/s ECA: 106/7 cm/s ICA/CCA: PS 1.11 The following velocities were obtained in the LEFT carotid system. CCA: 75/26 cm/s ICA: 87/31 cm/s ECA: 96/16 cm/s ICA/CCA: PS 1.16                            CONCLUSION: 1.  Mild atheromatous plaque in the carotid arteries. 2.  No significant stenosis on either side. Evaluation based on velocities and NASCET criteria.    Current Medications.     aspirin  81 mg Oral DAILY     atorvastatin  40 mg Oral QHS     carvedilol  12.5 mg Oral BID with meals     methadone  20 mg Intravenous Once     mupirocin  1 application Each Nare BID     potassium chloride ER  30 mEq  Oral Q4H     senna-docusate  1 tablet Oral BID     sodium chloride  3 mL Intravenous Line Care     PRN:acetaminophen, acetaminophen, bisacodyl, ceFAZolin (ANCEF) IV, hydrALAZINE, magnesium hydroxide, morphine  injection, naloxone **OR** naloxone, naloxone **OR** naloxone, ondansetron **OR** ondansetron, oxyCODONE, sodium chloride bacteriostatic    This note was created with help of Dragon dictation system. Grammatical /typing errors are not intentional.   1/11/2019  [facility] Faculty Attestation  I have seen and examined the patient.  I have discussed the case with the resident physician(s), Dr. Wills  I agree with the findings, assessment and plan.    Aakash Perez MD

## 2021-06-23 NOTE — PROGRESS NOTES
At 11:30am Pt (according to nurse that had him from 7:40am to 11:30am) is more drowsy.  She called Al Forcha who came to see pt.  Writer also assessed pt at that time with son (very confident in English) interpreting.  Pt still A&Ox4, but needs to be encouraged to answer questions or falls asleep in between questions.  Also checked arm and leg strength, no neuro deficits observed.  Pt did appear less drowsy over next hours after he got a nap in bed. And, he finally agreed to shower (1st after surgery)  at about 1600 this afternoon.

## 2021-06-23 NOTE — PLAN OF CARE
Problem: Food- and Nutrition-Related Knowledge Deficit (NB-1.1)  Goal: Nutrition education  Outcome: Completed Date Met: 01/14/19    With pt and son who also speaks english. Written materials provided in both ong for pt and english.

## 2021-06-23 NOTE — CONSULTS
Cardiothoracic Surgery Consult    Date of Service: 1/10/2019    REFERRING CARDIOLOGIST:      REASON FOR CONSULTATION: Consideration for surgical revascularization     HISTORY OF PRESENT ILLNESS: Main Carney is a 63 y.o. year-old Hmong speaking male who has a PMH of HTN, ISAAC, chest pain significantly limiting activity, who presented to [facility] for urgent evaluation of chest pain. Coronary angiogram performed today demonstrates severe multi-vessel coronary artery disease as below. CV Surgery is consulted for possible coronary artery bypass surgery.    Patient reports chest pain through his son and professional     The patient's coronary artery disease risk factors include HTN.  Patient was generally active as he works as a .   Patient denies history of bleeding/clotting issues and issues with anesthesia in the past.     PAST MEDICAL HISTORY:   Past Medical History:   Diagnosis Date     Chest pain     2018     HTN (hypertension)      Shortness of breath        PAST SURGICAL HISTORY:   Past Surgical History:   Procedure Laterality Date     APPENDECTOMY         ALLERGIES:   No Known Allergies       CURRENT MEDICATIONS:  No current facility-administered medications on file prior to encounter.      Current Outpatient Medications on File Prior to Encounter   Medication Sig Dispense Refill     acetaminophen (TYLENOL) 325 MG tablet Take 650 mg by mouth every 6 (six) hours as needed for pain.       aspirin 81 mg chewable tablet Chew 1 tablet (81 mg total) daily. 30 tablet 0     atorvastatin (LIPITOR) 40 MG tablet Take 1 tablet (40 mg total) by mouth at bedtime. 30 tablet 5     carvedilol (COREG) 12.5 MG tablet Take 1 tablet (12.5 mg total) by mouth 2 (two) times a day with meals. 60 tablet 5         FAMILY HISTORY:   Family History   Problem Relation Age of Onset     No Medical Problems Mother      No Medical Problems Father      Heart disease Neg Hx          SOCIAL HISTORY:   Social  "History     Socioeconomic History     Marital status:      Spouse name: Not on file     Number of children: Not on file     Years of education: Not on file     Highest education level: Not on file   Social Needs     Financial resource strain: Not on file     Food insecurity - worry: Not on file     Food insecurity - inability: Not on file     Transportation needs - medical: Not on file     Transportation needs - non-medical: Not on file   Occupational History     Not on file   Tobacco Use     Smoking status: Never Smoker     Smokeless tobacco: Never Used   Substance and Sexual Activity     Alcohol use: Not on file     Drug use: Not on file     Sexual activity: Not on file   Other Topics Concern     Not on file   Social History Narrative     Not on file       REVIEW OF SYSTEMS:  CONSTITUTIONAL: No weight loss, fever   SKIN: No rash  CARDIOVASCULAR: No chest pain or chest discomfort. No palpitations or edema.   RESPIRATORY: No shortness of breath or cough.  GASTROINTESTINAL: No nausea, vomiting or diarrhea. No abdominal pain.  NEUROLOGICAL: No headache, dizziness, syncope  HEMATOLOGIC: No anemia, bleeding or bruising.     PHYSICAL EXAMINATION:   Vitals: /68   Pulse (!) 57   Temp 98.3  F (36.8  C)   Resp 18   Ht 5' 1\" (1.549 m)   Wt 143 lb (64.9 kg)   SpO2 99%   BMI 27.02 kg/m    GENERAL:  Well developed and well nourished   CARDIOVASCULAR: RRR on monitor; no edema.  RESPIRATIONS: Non-labored breathing on room air.  ABDOMEN: Soft, non-distended, non-tender  EXTREMITIES: No deformity, no edema  NEUROLOGIC: Intact and symmetric with no focal deficits.   PSYCHIATRIC: Alert and oriented x3, pleasant     LABORATORY STUDIES:   Lab Results   Component Value Date    WBC 11.1 (H) 01/10/2019    HGB 14.5 01/10/2019    HCT 41.5 01/10/2019    MCV 90 01/10/2019     01/10/2019      Lab Results   Component Value Date    CREATININE 0.77 01/10/2019    BUN 16 01/10/2019     01/10/2019    K 4.0 01/10/2019 "     (H) 01/10/2019    CO2 24 01/10/2019     No results found for: HGBA1C    EKG 1/10/2019   Sinus tachycardia with 2nd degree A-V block with 2:1 A-V conduction   Right bundle branch block   Left ventricular hypertrophy with QRS widening   Inferior infarct (cited on or before 26-DEC-2018)   Abnormal ECG   When compared with ECG of 26-DEC-2018 14:35,   Sinus rhythm is now with 2nd degree A-V block   T wave inversion now evident in Anterior leads       CARDIAC CATHETERIZATION  1/10/2019:   Anomalous. Take off is very anterior, near the junction of the right and left cusps   Mid LM to Dist LM lesion is 40% stenosed.   Left Anterior Descending   Prox LAD-1 lesion is 50% stenosed.   Prox LAD-2 lesion is 70% stenosed.   Mid LAD lesion is 80% stenosed.   Dist LAD lesion is 70% stenosed.   Second Diagonal Branch   Ost 2nd Diag lesion is 70% stenosed.   Third Diagonal Branch   Ost 3rd Diag lesion is 50% stenosed.   Left Circumflex   Mid Cx to Dist Cx lesion is 70% stenosed with 70% stenosed side branch in Ost 3rd Mrg to 3rd Mrg.   Second Obtuse Marginal Branch   Ost 2nd Mrg lesion is 50% stenosed. Very small artery.   Right Coronary Artery   Ost RCA lesion is 80% stenosed.   Prox RCA lesion is 90% stenosed.   Mid RCA lesion is 80% stenosed.   Right Posterior Descending Artery   Ost RPDA lesion is 50% stenosed.   RPDA lesion is 90% stenosed.         TRANSTHORACIC ECHOCARDIOGRAM 1/10/2019     Left ventricle ejection fraction is normal. The calculated left ventricular ejection fraction is 63%.    Normal right ventricular size and systolic function.    No hemodynamically significant valvular heart abnormalities.    No previous study for comparison.      CAROTID ULTRASOUND   Pending      CXR:  Trace right pleural effusion or pleural scar. Heart size appears normal. Lungs appear clear    IMPRESSION AND PLAN: Main Carney is a 63 y.o. with severe multi-vessel coronary artery disease and  unstable angina,  Echocardiography  demonstrates preserved left ventricular function,  with an LVEF of 63%. After review of the above information, we believe that he is a reasonable surgical candidate. Our team discussed the technical details of coronary artery bypass grafting with the patient, as well as the expected postoperative course and recovery. The risks include but are not limited to bleeding, infection, stroke, heart or graft failure, dysrhythmia, respiratory failure, kidney or liver injury, bowel or limb ischemia, and death. His calculated STS risk for mortality is 1%. The calculated risk of major morbidity or mortality is 8.5% with risk of permanent stroke being 1.2%.     Pt will be admitted today   - Tentative plan for CABG on Friday, Santiago 10 second case with Dr. Otto,  pending further testing, patient has discussed with family and agrees to surgery.     - Remainder of cares per primary team.   Thank you very much for this referral.   Patient and plan discussed with Dr. Otto.    Patient was seen and examined by me.  I agree with the above.  Surgery is scheduled for tomorrow at 11 am.  He will require a multivessel CABG.

## 2021-06-23 NOTE — OP NOTE
DATE OF SERVICE: 01/11/2019    DATE OF SERVICE:  01/11/2019    OPERATING AREA:  Room 3    TITLE OF PROCEDURES:  1.  Epiaortic ultrasound of the ascending aorta.  2.  Quintuple vessel coronary artery bypass grafting procedures; left internal  mammary artery to left anterior descending coronary artery and separate reverse  saphenous vein grafts to the left anterior descending diagonal branch, 2, the obtuse  marginal 2 and a sequential vein graft to the proximal right posterior descending on  to the mid right posterior descending.  3.  Endoscopic vein procurement from the left lower extremity.    ATTENDING SURGEON:  Jazmín Otto MD    FIRST ASSISTANT:  Giulia SINGH [facility]      PHYSICIAN'S ASSISTANT:  URBANO Church.    ANESTHESIA:  General endotracheal.    SKIN PREP:  Betadine and DuraPrep.    INCISIONS:  Median sternotomy and skip incisions along the course of the greater  saphenous vein, left lower extremity.    DRAINS:  One 32-Bruneian Rison mediastinal, one 24-Bruneian Daron left chest.    CULTURE:  None.    SPECIMEN:  None.    CLOSURE:  Routine.    PREOPERATIVE DIAGNOSES:  1.  Progressive angina.  2.  Preserved left ventricular function.  3.  Severe diffuse triple vessel coronary artery disease.  4.  Abnormal takeoff of the left main coronary artery.    POSTOPERATIVE DIAGNOSES:  1.  Progressive angina.  2.  Preserved left ventricular function.  3.  Severe diffuse triple vessel coronary artery disease.    4.  Abnormal takeoff of the left main coronary artery.  5.  Normal pulmonary artery pressures and the patient may have had a left-sided  superior vena cava.    FINDINGS AT OPERATION:  The patient's ascending aorta was free of palpable plaque.   There was a dense adhesion between the ascending aorta and the superior vena cava.   This was partially taken down.  The pulmonary artery pressures were normal.  Overall  contractility was preserved both before and after surgery.  The patient's lungs  appear to be  somewhat hyperexpanded.  There were some anthracotic lymph nodes around  the proximal left internal mammary artery.  The patient's sternum was somewhat  osteoporotic.  The left internal mammary artery was a 1.5 mm conduit with excellent  flow.  The reverse saphenous vein graft measured 3.5 to 4.5 mm in diameter and was  of fair to good quality.  The proximal right posterior descending coronary artery  was a 2 mm target vessel and excellent vessel for bypass grafting.  The mid right  posterior descending coronary artery was a 1.5 to 2 mm target vessel, a good vessel  for bypass grafting.  The obtuse marginal 2 was a 1.5 to 2 mm target vessel, a good  vessel for bypass grafting.  The left anterior descending diagonal branch, 2 was a  1.5 mm target vessel, a fair vessel for bypass grafting and the left anterior  descending coronary artery was a 1.5 to 2 mm target vessel.  It was diffusely  diseased and therefore only a fair vessel for bypass grafting.  The patient did have  an element of left ventricular hypertrophy.    PROCEDURE:  The patient arrived in the operating room and an arterial line was  placed.  Satisfactory general endotracheal anesthesia was induced.  An OG tube was  inserted followed by a transesophageal echo probe, a [place]-Melvina catheter and a Harrison  catheter.  The patient's neck, chest, abdomen, both groins and lower extremities  were prepped and draped in a standard sterile fashion.  A complete median sternotomy  was performed.  With the aid of the Rultract retractor the left internal mammary  artery was taken down from the subclavian vein to the xiphoid process.  At the same  time, a skip incision was made along the course of the greater saphenous vein in the  left upper leg by Drew Brink.  He then passed an endoscope proximally and  distally along the course of the greater saphenous vein.  The vein was mobilized  circumferentially; its branches were clipped or cauterized.  It was  clipped  proximally and distally and extracted.  It was then cannulated and distended and  prepared in the usual fashion.  The incisions in the left lower extremity were  irrigated with warm saline solution.  There were rendered hemostatic and then closed  in layers using running 2-0 and 3-0 Vicryl, as well as Dermabond on the skin.    Heparin was administered.  The left internal mammary artery was prepared in the  usual fashion.  A Andrews retractor was inserted.  The pericardium was opened and  tented up on pericardial sutures.  The aorta was  from the pulmonary  artery.  The adhesions between the superior vena cava and the ascending aorta were  partially taken down.  Epiaortic ultrasound was carried out of the ascending aorta.   Pursestring sutures were placed in the ascending aorta and right atrium for  cannulation.  When the ACT was appropriate, cannulation was performed and  cardiopulmonary bypass established.  The patient was cooled to 32 degrees  centigrade.  A retrograde cardioplegia catheter was inserted via the right atrium  into the coronary sinus.  The aorta was crossclamped and a liter of cold blood  cardioplegic solution was administered antegrade and an additional 200 mL of cold  blood cardioplegic solution was administered retrograde.  A good arrest was  achieved.  Cold topical saline and slush was applied to the heart intermittently  during the period of aortic crossclamp.  Attention was first turned to the proximal  right posterior descending coronary artery.  This vessel was dissected out for a  distance of 1 cm and then opened for a distance of 8 mm.  It was bypassed in a  side-to-side fashion using a segment of the reverse saphenous vein graft.  Upon  completion of this 1st distal anastomosis, the vein graft was flushed with cold  blood cardioplegic solution and the patient received a dose of cold blood  cardioplegia in a retrograde fashion.  Next, that segment of a vein graft was  sized  to the mid right posterior descending coronary artery.  That portion of the artery  was dissected out for a distance of 1 cm and then opened for a distance of 8 mm.  It  was bypassed in an end-to-side fashion using running 7-0 Prolene.  Upon completion  of this 2nd distal anastomosis, the vein graft was flushed with cold blood  cardioplegic solution and sized to the ascending aorta and the patient received 400  mL of cold blood cardioplegia in a retrograde fashion.  Next, attention was turned  to the obtuse marginal 2.  This vessel was dissected out for a distance of 1 cm and  then opened for a distance of 1 cm.  It was bypassed in an end-to-side fashion using  reverse saphenous vein graft and running 7-0 Prolene.  Upon completion of this 3rd  distal anastomosis, the vein graft was flushed with cold blood cardioplegic solution  and sized to the ascending aorta and an additional 400 mL of cold blood cardioplegic  solution was administered in a retrograde fashion.  Next, attention was turned to  the left anterior descending diagonal branch, 2.  This vessel was dissected out for  a distance of 1 cm and then opened for a distance of 8 mm.  It was bypassed in an  end-to-side fashion using reverse saphenous vein graft and running 7-0 Prolene.   Upon completion of this 4th distal anastomosis, the vein graft was flushed with cold  blood cardioplegic solution and sized to the ascending aorta and an additional 300  mL of cold blood cardioplegic solution was administered in an antegrade fashion as  the retrograde catheter had come out.  Finally, attention was turned to the left  anterior descending coronary artery in the intraventricular groove.  This vessel was  dissected out at the junction of its mid and distal thirds for a distance of 1 cm  and then opened for a distance of 1 cm.  A pleural rent was created for passage of  the left internal mammary artery and then the 5th and last distal anastomosis was  performed  between the left internal mammary artery and the left anterior descending  coronary artery in an end-to-side fashion using running 7-0 Prolene.  As this 5th  and last distal anastomosis was being performed gentle warming was begun.  The grey  occluder was released from the left internal mammary artery and good flow was seen  down the distribution of the left anterior descending coronary artery.  The grey  occluder was once more applied to the left internal mammary artery and the patient  received a final dose of cold blood cardioplegia in an antegrade fashion.  It had  been decided to perform the 3 proximal aortosaphenous anastomoses with the aortic  crossclamp in place; therefore, three 3.5 mm punch aortotomies were created and the  3 proximal aortosaphenous anastomoses were constructed in an end-to-side fashion  using running 6-0 Prolene.  The grey occluder was released from the left internal  mammary artery and a hot shot was delivered in antegrade fashion.  The aortic  crossclamp was released.  The aortic crossclamp time was an hour and 34 minutes.   The patient did not require defibrillation.  The proximal and distal anastomoses  were examined and found to be hemostatic.  It should be noted that the left pleural  space could not be entered separately because of the dense adhesions present.   Ventricular and atrial pacing wires were applied and the patient was AV sequentially  paced at a rate of 90.  The retrograde cardioplegic catheter was removed and that  site repaired with two 4-0 Prolene sutures.  The root vent was removed and that site  repaired with pledgeted 4-0 Prolene.  Gentle ventilation was once more resumed.  The  patient was placed on low-dose epinephrine.  When he was warm the heart was allowed  to fill and eject and the patient  from cardiopulmonary bypass without  difficulty.  Total time on cardiopulmonary bypass was an hour and 51 minutes.  The  patient was decannulated and the  cannulation sites oversewn with 4-0 Prolene.   Protamine was administered to reverse the heparin.  Hemostasis was satisfactory.   The mediastinum was drained with a 32-Zimbabwean Glenvil chest tube as well as a  24-Zimbabwean Daron drain.  The sternum was approximated with six #6 stainless steel  sternal wires.  The sternotomy wound was irrigated with warm antibiotic containing  solution.  It was closed in 4 layers using 2 layers of running 0 Vicryl, an  additional layer of 2-0 Vicryl and a subcuticular stitch of 4-0 Monocryl.  Dermabond  was applied to the skin.  The sponge, needle and instrument counts were reported as  correct.  The estimated blood loss was 400 mL.  Mr. Carney was brought to the  intensive care unit in critical but stable condition.      ELLIE ANGELES MD  pa  D 01/12/2019 09:43:32  T 01/12/2019 11:05:54  R 01/12/2019 11:05:54  [id number]        cc:KATIE ANGELES MD

## 2021-06-23 NOTE — PLAN OF CARE
Mobility level is maintained or improved Progressing      Pt attempted to walk but the ankle is giving him difficulties due to gout.       Mobility level is maintained or improved Progressing      Pt has refused additional medications for pain.  Pain rating is a 3 of 10 in right ankle.  There is a lidocaine patch on his right foot and he is taking colchicine for the gout.

## 2021-06-23 NOTE — DISCHARGE SUMMARY
"Cardiac Rehab Discharge Summary    Problem List  Patient Active Problem List    Diagnosis Date Noted     SSS (sick sinus syndrome) (H) 01/19/2019     Coronary artery disease due to lipid rich plaque 01/18/2019     Paroxysmal atrial fibrillation (H) 01/17/2019     S/P CABG (coronary artery bypass graft)      Unstable angina (H) 01/10/2019     Hyperlipidemia LDL goal <70      Angina, class III (H) 01/03/2019     HTN (hypertension) 01/03/2019       Pertinent Medical History  Pertinent Medical History: Hyperlipidemia, HTN    Risk Factors  Risk Factors: Hypertension, High cholesterol, Obesity, Stress(very active at work)    Living Situation  Living Accomodations: House, Spouse, Family    Vitals  Height: 5' 1\" (154.9 cm)     Weight: 139 lb (63 kg)      Labs    Hemoglobin A1c   Date/Time Value Ref Range Status   01/10/2019 05:16 PM 5.8 4.2 - 6.1 % Final     Troponin I   Date/Time Value Ref Range Status   12/26/2018 02:09 PM 0.02 0.00 - 0.29 ng/mL Final     BNP   Date/Time Value Ref Range Status   12/26/2018 02:09 PM 73 (H) 0 - 57 pg/mL Final     Lab Results   Component Value Date    CHOL 105 01/10/2019     Lab Results   Component Value Date    HDL 38 (L) 01/10/2019     Lab Results   Component Value Date    LDLCALC 53 01/10/2019     Lab Results   Component Value Date    TRIG 71 01/10/2019         Treatment Progression: Maximal Activity and Exercise Level  Transfers  Bed Mobility: Min A, Mod A, Assist of 1  Bed to Chair: SBA, Assist of 1  Chair to Stand: Independent  Transfer Comments: pt had been using a walker.  Tried walking without device and pt tolerated it very well without any loss of balance.      Walking/Marching  Distance: 680 feet  Time: 3  Met Level: 3  Peak HR: 84  Peak BP: 155/62  Ambulation Status: SBA, Independent  Device: None  Comment:  was present    Steps  Number of Steps: 8  Met Level: 4  Peak HR: 80  Peak BP: 146/80         Bike/Pedex  Time: 10 min  Workload: 2  Met Level: 2  Peak HR: " 88  Peak BP: 122/58  Comment: tired    Nustep  Time: 5  Workload: 2  Met Level: 2.6  Peak HR: 85  Peak BP: 178/79  Arm Use: No  Comment: SOB         Tolerance Level/Symptoms/Complications       Symptomatic: Pt reported SOB and chest pressure while reviewing his cardiac sxs. ECG showed SB, rates in 30's. Sxs reported to nursing, Dr Otto and Mary Ann GUTIERREZ CNP    EKG/Arrhythmias  ECG: Sinus Rhythm    Discharge Disposition  Discharge   Recommended Discharge Disposition: Home with family  Outpatient Location: [facility]  Order Received On: 01/11/19  Comment:  and wife were present.

## 2021-06-23 NOTE — PLAN OF CARE
Breathing      Patient will maintain patent airway Progressing      Patient will utilize incentive spirometer Progressing          JUAN Lou, 1/12/2019

## 2021-06-23 NOTE — PROGRESS NOTES
"Clinical Nutrition Therapy Note      Reason for Assessment:   Main Carney is a 63 y.o. male assessed by the registered Dietitian for follow up.    Discussion with patient and wife with [employer] interpreters using [employer]- required 2 due to interruptions [id number] and [id number], also in person  during early part of conversation.  Pt asked if he can still eat sweets, as he has been receiving insulin here.    Nutrition History:  Pt is Hmong-speaking.   Pt used to eating anything he wants.    Current Nutrition Prescription:   Diet: Cardiac, Diabetic, no caffeine  IV dextrose or Fluids:     Current Nutrition Intake:  75 - 100% recorded meals    Anthropometrics:  Height: 5' 1\" (154.9 cm)  Admission weight:143#  Weight: 144 lb 14.4 oz (65.7 kg);    BMI (Calculated): 27  BMI indication: 25-29.9 overweight  Ideal body weight 112#  % Ideal body weight 128%  Weight History:  146# 1/3/19    RD Nutrition Focused Physical Exam:  The patient has the following physical signs which could indicate malnutrition: none    GI Status/Output:   Bowel Sounds present per nurse  Last BM: 1/16/19 per nurse    Skin/Wound:  Marlon score Marlon Scale Score: 20    Medications:  Medications reviewed.  Vit c, colchicine, colace , ferrous sulfate, lasix miralax, ss insulin,( prednisone for gout flare up will wean off per md)    Labs:  Labs reviewed.    Glu, poc 126 - 184 mg/dl  Hgb 8.0 L    Estimated Nutrition Needs:  Assessment weight is 50 kg, ideal weight    Energy Needs: 4336-5914 kcals daily, 22-25 kcal/kg  Protein Needs: 60-70 g daily, 1.2-1.4 g/kg.    Malnutrition: Not noted    Nutrition Risk Level: low risk    Nutrition dx:   Food and nutrition-related knowledge deficit r/t CV surgery.     Goal:   Heart healthy nutrition education prior to discharge with f/u at outpatient cardiac rehab. - progressing    Intervention:  Showed pt and wife where diet information located in heart health education binder.  Reviewed heart healthy diet and low " purine diet ( put copy in binder).  Shared with pt that elevated BG is from prednisone which is temporary, and he should be okay to eat sweets at home however, the best way to eat for healing and heart is heart healthy diet.    Monitoring/Evaluation:   Diet ed 's     Electronically signed by:  Elyse Donald RD

## 2021-06-23 NOTE — PROGRESS NOTES
Respiratory Care Note:    Patient tolerated EZ PAP treatment x2 without issue. Patient scored 900mL with the IS, which is improvement from yesterday (500mL). Patient remains on room air with saturations greater than 90%. Patient is not in respiratory distress. RCAT will be done tomorrow. RT following.    FRANK [place]T

## 2021-06-23 NOTE — PROGRESS NOTES
Patient Name: Main Carney   MRN: [id number]   Date of Admission: 1/10/2019    Procedure: CORONARY ARTERY BYPASS x 5, LEFT ENDOSCOPIC VEIN HARVEST, INTERNAL MAMMARY ARTERY, EPIAORTIC ULTRASOUND, ANESTHESIA TRANSESOPHAGEAL ECHOCARDIOGRAM    Post Op day #: 3    Subjective (Patient focus/Primary Problem for shift): Pt c/o incision pain as well as severe R knee ankle pain. Al Forcha with CV surgery aware. Family does say he has hx of gout, but hasn't taken any medication for gout ever.  The pain did lessen about 1 hour after first colchicine pill.            Pain Goal 0-3  Pain Rating 2-10          Pain Medication/ Regime effective to reduce patient pain: Scheduled tylenol and PRN tramadol.    Objective (Physical assessment):           Rhythm: normal sinus rhythm since about 1300. Was Sinus dysrythmia with some Afib before this. Also Evan in 50s this AM.  But SR 60-80s since about 1300            Bowel Activity: yes if Yes indicate when:  1/14 about 11am          Bowel Medications: yes- scheduled.            Incision: healing well          Incentive Spirometry Q 1-2 hour when awake:  yes Volume:  500-750          Epicardial Pacing Wires:  no            Patient Activity:           Up to chair for meals: yes          Ambulation with RN x2 (Not including CR): no.  CR attempted twice today, but knee hurt too much to walk very far.  Pt has walked in room 3-4 times today ( so total of about 60 feet).             Is patient in home clothes:no             Chest Tubes   Pleural: no Draining: NA              Suction: not applicable              Mediastinal: no Draining: not applicable               Suction: not applicable   Dressing Change Daily:yes If No, why? NA  Pt got his first shower today after CT removed.                     Urinary Catheter: no           Preventative WOC consult (need MD order): no       Assessment (Nursing primary shift focus): Pt is drowsy, but alert and oriented (through ).  He needs to be  pushed on pulmonary toilet and activity.  (Family understands and is very supportive in helping to push him to move).    Plan (Patient Care Plan/focus):  Increase activity and push pulmonary toilet.      Danita James   1/14/2019   5:41 PM

## 2021-06-23 NOTE — PROGRESS NOTES
Patient Name: Main Carney   MRN: [id number]   Date of Admission: 1/10/2019    Procedure: CORONARY ARTERY BYPASS x 5, LEFT ENDOSCOPIC VEIN HARVEST, INTERNAL MAMMARY ARTERY, EPIAORTIC ULTRASOUND, ANESTHESIA TRANSESOPHAGEAL ECHOCARDIOGRAM    Post Op day #: 9    Subjective (Patient focus/Primary Problem for shift): Monitor rhythm, give IV lasix, and encourage activity.           Pain Goal 3 Pain Rating 0           Pain Medication/ Regime effective to reduce patient pain scheduled tylenol     Objective (Physical assessment):           Rhythm: normal sinus rhythm (with short bursts of A- fib in the 100's then converts to sinus rhythm), then around 0400 pt was sinus loren in the 50's.             Bowel Activity: yes if Yes indicate when: 1/18/2019          Bowel Medications: yes            Incision: healing well  Volume: 1000                  Epicardial Pacing Wires:  not applicable            Patient Activity:           Up to chair for meals: yes          Ambulation with RN x2 (Not including CR): yes            Is patient in home clothes:no             Chest Tubes   Pleural: not applicable Draining: not applicable               Suction: not applicable              Mediastinal: not applicable Draining: not applicable               Suction: not applicable   Dressing Change Daily:not applicable If No, why?  D/C chest tubes                     Urinary Catheter: no           Preventative WOC consult (need MD order): no       Assessment (Nursing primary shift focus):  Pt up in chair most of shift, VSS, in normal sinus with bursts of a- fib. Denies any pain, or shortness of breath, lung sounds clear on RA. Family in room, supportive.     Plan (Patient Care Plan/focus):  Control pain with scheduled tylenol, monitor rhythm overnight, continue IV lasix.       Vale Grossman   1/19/2019   2:07 AM

## 2021-06-23 NOTE — PROGRESS NOTES
Patient discharged to home with family.  Activity restrictions, follow up appointments discussed with family and patient, family understands appointment schedule and need to follow up with primary care office on Monday.  Medication review and teaching completed with family and patient, family stated they understand all medication instructions and will follow AVS report until following up with primary care clinic.  Patient belongings reviewed at bedside with family, family stated they have all of their belongings.     Francis Elizalde RN

## 2021-06-23 NOTE — PLAN OF CARE
Patient Name: Main Carney   MRN: [id number]   Date of Admission: 1/10/2019    Procedure: CORONARY ARTERY BYPASS x 5, LEFT ENDOSCOPIC VEIN HARVEST, INTERNAL MAMMARY ARTERY, EPIAORTIC ULTRASOUND, ANESTHESIA TRANSESOPHAGEAL ECHOCARDIOGRAM    Post Op day #:2    Subjective (Patient focus/Primary Problem for shift): Increase energy, improve appetite and rest.          Pain Goal0 Pain Rating2           Pain Medication/ Regime effective to reduce patient pain schedule tylenol     Objective (Physical assessment):           Rhythm: normal sinus rhythm bbb            Bowel Activity: no if Yes indicate when: n/a          Bowel Medications: yes            Incision: healing well          Incentive Spirometry Q 1-2 hour when awake:  yes Volume: 500-750          Epicardial Pacing Wires:  no            Patient Activity:           Up to chair for meals: yes          Ambulation with RN x2 (Not including CR): yes            Is patient in home clothes:no             Chest Tubes   Pleural: no Draining: no               Suction: no              Mediastinal: no Draining: no               Suction: no   Dressing Change Daily:yes If No, why?                     Urinary Catheter: no           Preventative WOC consult (need MD order): no       Assessment (Nursing primary shift focus): Patient is more awake today, pain is managed well with schedule tylenol. Chest tube and garces have been dc today. He has voided. No bm today but bowel medications given, having flatus. Tolerated his meal today no N/V. k 3.9 mg 2.3 continue to monitor.     Plan (Patient Care Plan/focus): Increase activity, manage pain, improve Incentive spirometry, improve swelling with iv lasix.       Jesi Palmer   1/13/2019   10:49 AM

## 2021-06-23 NOTE — PROGRESS NOTES
Patient Name: Main Carney   MRN: [id number]   Date of Admission: 1/10/2019    Procedure: CORONARY ARTERY BYPASS x 5, LEFT ENDOSCOPIC VEIN HARVEST, INTERNAL MAMMARY ARTERY, EPIAORTIC ULTRASOUND, ANESTHESIA TRANSESOPHAGEAL ECHOCARDIOGRAM    Post Op day #: 4  Subjective (Patient focus/Primary Problem for shift): Pt c/o pain of the right knee and ankle due to gout.          Pain Goal 0 Pain Rating 5-6  with movement           Pain Medication/ Regime effective to reduce patient pain    Objective (Physical assessment):           Rhythm: normal sinus rhythm  With BBB          Bowel Activity: yes if Yes indicate when:  Today per pt's report          Bowel Medications: no            Incision: healing well          Incentive Spirometry Q 1-2 hour when awake:  yes Volume: 750 ml          Epicardial Pacing Wires:  no            Patient Activity:           Up to chair for meals: yes          Ambulation with RN x2 (Not including CR): no            Is patient in home clothes:no             Chest Tubes   Pleural: no Draining: not applicable               Suction: no              Mediastinal: no Draining: not applicable               Suction: not applicable   Dressing Change Daily done If No, why? DAMIÁN                     Urinary Catheter: no           Preventative WOC consult (need MD order): no       Assessment (Nursing primary shift focus):  Pt c/o pain with activity, unable to bear weight of the right leg when he tried to walk. Pt denies pain when he is at rest.     Plan (Patient Care Plan/focus): Encourage use of IS and flutter valve. Ambulate as tolerated.      Denae Guerrero   1/15/2019   7:25 PM

## 2021-06-23 NOTE — PROGRESS NOTES
Respiratory care notes:    Pt remains on room air, SpO2 95%.  HR 76, RR 22, BBS clear and diminished.  Pt tolerated flutter valve x2 well and achieved 550 ml of IS.  Mild accessory muscle use noted.  Pt breathing shallow and labored.  Some pain in the chest area, more pain on the right of the chest.  Family members in the room visiting.  Rt following.    Lacie Kohli, LRT

## 2021-06-23 NOTE — PROGRESS NOTES
RT Heart Teaching Worksheet       Date of Surgery 1/11/2019          Date Taught 1/10/2019  Time of Surgery 1100          Surgeon Dr. Otto    IS 1600 ml Achieved        IS 1800 ml Predicted        Height 5'1 in.      History : Smoker ?:no   Quit ? :             When?:                        History: COPD ?:no                                       Asthma ? : no               LAURENCE ? : no               Home Machine ? : no                                         What Meds if Lung History?: n/a    Items to discuss with Patient : (done or not done)     Heart Pillow/ Coughing: done  Flutter Valve: done  Questions Answered:done    Charting that needs to be done: (done or not done)    IS charted in Flowsheet done  IS achieved placed in patient binder done  Education Charted in Ed Activity done  Charges/productivity done      Comments/ Note :

## 2021-06-23 NOTE — PLAN OF CARE
Discharge Barriers      Patient's discharge needs are met Progressing        Pain      Patient's pain/discomfort is manageable Progressing        Potential for Falls      Patient will remain free of falls Progressing        Safety      Patient will be injury free during hospitalization Progressing

## 2021-06-23 NOTE — PLAN OF CARE
Hemodynamic Status      Patient's vitals signs are stable Progressing          The patients HR dropped to the 30's.  The patient C/O CP at that time but did have a SBP that was > 90.  He didn't feel light headed at that time.  His HR would go up and down. At times the patient was bradycardic in the 30's and other times he was NSR.  He was having runs of SVT but this have since resolved.

## 2021-06-23 NOTE — PROGRESS NOTES
Care Plan  Care Management      Care Management Goals of the Day: chart review    Care Progression Reviewed With: Charge RN, MD, HUC, RNCM, CMSW    Barriers to Discharge:POD 3 CABG    Discharge Disposition:home    Expected Discharge Date:1/15 -1/16    Transportation:family      Care Coordination Narrative:Home with family support and OP cardiac rehab at [facility]

## 2021-06-23 NOTE — CONSULTS
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    CV:  HTN, CAD, CAB x 5: CAB x 5 on 1/11/19. EF 50% intraop. CI <2, improved to >2; still ventricle. ScvO2 60s, up to 70s. No blood products given.    Vasoactive medications and chest tubes per CV surgery; will co-manage with their service    ASA    Will need to restart high-dose statin    NEURO:  Perioperative anesthesia:    Wake and SBT    RESP:  Perioperative mechanical ventilation:    ET tube 1.5 cm proximal to mae, directed toward right mainstem; will pull back 1 cm    Wake, SBT, expedite extubation per protocol    Chest tubes per CV surgery    GI:  No acute issues.    Bowel regimen    RENAL:  No acute issues.    Monitor    Replace lytes as needed    ID:  No acute issues.    monitor    HEMATOLOGIC:  Anemia: Periop anemia, stable chest tube output, no evidence of active hemorrhage.    Monitor with restrictive transfusion threshold    ENDOCRINE:  No active issues.    monitor    ICU PROPHYLAXIS:    Heparin SC    PPI    HOB elevation    chlorhexidine    Lines/Drains/Tubes:  Central line (R IJ introducer with PAC), Placed on 1/11/19  Arterial line (L brachial), Placed on 1/11/19  Feeding tube placed on 1/11/19  Harrison catheter placed on 1/11/19  Chest tube x 2    Restraints  indicated    Progress Note  Restraint Application    I recognize that restraints are physical and/or chemical interventions intended to restrict a person's movements. Restraints are currently needed to ensure the safety of this patient and/or others. My clinical rationale appears below.    Category/Type of Restraint     Non Violent:  Soft limb restraint x2  --  Behavior  Pulling at tubes/lines  --  Root Cause of the Behavior  Sedation/intubation  --  Less-Restrictive Measures that Failed  Non Violent Measures:  Close Observation  --  Response to the  Restraint  Patient unable to pull at tubes/lines  --  Criteria for Release from the Restraint  Patient calm and off sedation    DISPO/CODE STATUS: FULL CODE. Today (1/11/2019), the patient is critically ill requiring invasive mechanical ventilation and continuous vasopressors.    FAMILY COMMUNICATION: I did not directly communicate with family.    I appreciate the opportunity to participate in the care of Mr. Carney.  Please feel free to contact me at any time.    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 Recent presentation with unstable angina, found to have multivessel CAD. S/P CAB x 5 today. No blood products given. PAP 30s/10s, EF 50% intraop. CI <2 initially, improved to 2s. ScvO2 60s, then up to 70s. Sinus loren. Still LV on echo.    Past Medical History:  Past Medical History:   Diagnosis Date     Chest pain     2018     HTN (hypertension)      Shortness of breath        Past Surgical History:  Past Surgical History:   Procedure Laterality Date     APPENDECTOMY       CV CORONARY ANGIOGRAM N/A 1/10/2019    Procedure: Coronary Angiogram;  Surgeon: Glory Carl MD;  Location: [facility] Cath Lab;  Service: Cardiology     CV LEFT HEART CATHETERIZATION WO LEFT VETRICULOGRAM Left 1/10/2019    Procedure: Left Heart Catheterization Without Left Ventriculogram;  Surgeon: Glory Carl MD;  Location: [facility] Cath Lab;  Service: Cardiology       Social History:  Social History     Socioeconomic History     Marital status:      Spouse name: Not on file     Number of children: Not on file     Years of education: Not on file     Highest education level: Not on file   Social Needs     Financial resource strain: Not on file     Food insecurity - worry: Not on file     Food insecurity - inability: Not on file     Transportation needs - medical: Not on file      "Transportation needs - non-medical: Not on file   Occupational History     Not on file   Tobacco Use     Smoking status: Never Smoker     Smokeless tobacco: Never Used   Substance and Sexual Activity     Alcohol use: Not on file     Drug use: No     Sexual activity: Not on file   Other Topics Concern     Not on file   Social History Narrative     Not on file       Family History:  Family History   Problem Relation Age of Onset     No Medical Problems Mother      No Medical Problems Father      Heart disease Neg Hx        Allergies:  No Known Allergies    MAR Reviewed      Physical Exam:  Vent settings for last 24 hours:  Vent Mode: VCV  FiO2 (%):  [80 %-100 %] 80 %  S RR:  [16] 16  S VT:  [400 mL] 400 mL  PEEP/CPAP (cm H2O):  [5 cm H2O] 5 cm H2O  Minute Ventilation (L/min):  [8.4 L/min] 8.4 L/min  PIP:  [33 cm H2O] 33 cm H2O  MAP (cm H2O):  [10] 10    /85 (Patient Position: Sitting)   Pulse 89   Temp 97.1  F (36.2  C) (Bladder)   Resp 16   Ht 5' 1\" (1.549 m)   Wt 141 lb 9.6 oz (64.2 kg)   SpO2 100%   BMI 26.76 kg/m      Intake/Output last 3 shifts:  I/O last 3 completed shifts:  In: 1050 [P.O.:50; I.V.:1000]  Out: 100 [Urine:100]  Intake/Output this shift:  I/O this shift:  In: 1580 [I.V.:1000; Blood:330; IV Piggyback:250]  Out: 500 [Blood:500]    Physical Exam  Gen: intubated, sedated  HEENT: NT, no MARILEE  CV: RRR, no m/g/r  Resp: CTAB, chest tubes with stable serosanginous output  Abd: soft, BS+  Skin: L leg ACE  Ext: no edema  Neuro: PERRL, still sedated    LAB:  Recent Results (from the past 24 hour(s))   Potassium - Next AM   Result Value Ref Range    Potassium 3.4 (L) 3.5 - 5.0 mmol/L   Echo Monitor LENNIE   Result Value Ref Range    BSA 1.67 m2    Hieght 61 in    Weight 2,265.6 lbs    /65 mmHg    HR 64 bpm   POCT Glucose   Result Value Ref Range    Glucose,  mg/dL   Hemogram 2   Result Value Ref Range    WBC 13.5 (H) 4.0 - 11.0 thou/uL    RBC 4.49 4.40 - 6.20 mill/uL    Hemoglobin 14.0 " 14.0 - 18.0 g/dL    Hematocrit 40.2 40.0 - 54.0 %    MCV 90 80 - 100 fL    MCH 31.2 27.0 - 34.0 pg    MCHC 34.8 32.0 - 36.0 g/dL    RDW 12.1 11.0 - 14.5 %    Platelets 372 140 - 440 thou/uL    MPV 8.9 8.5 - 12.5 fL   Basic Metabolic Panel (Na,K+, Cl, BUN, Cr)   Result Value Ref Range    Sodium 136 136 - 145 mmol/L    Potassium 3.7 3.5 - 5.0 mmol/L    Chloride 104 98 - 107 mmol/L    CO2 25 22 - 31 mmol/L    Anion Gap, Calculation 7 5 - 18 mmol/L    Glucose 106 70 - 125 mg/dL    Calcium 9.4 8.5 - 10.5 mg/dL    BUN 14 8 - 22 mg/dL    Creatinine 0.76 0.70 - 1.30 mg/dL    GFR MDRD Af Amer >60 >60 mL/min/1.73m2    GFR MDRD Non Af Amer >60 >60 mL/min/1.73m2   INR (ON DAY OF SURGERY)   Result Value Ref Range    INR 1.09 0.90 - 1.10   POCT Post-op Cardiac   Result Value Ref Range    POC pH 7.405     POC pCO2 43.6 mmHg    POC pO2 449 mmHg    Base Exc, Art 3 mmol/L    POC TCO2 29 mmol/L    POC HCO3 27.4 mmol/L    POC O2 Sat 100 %    POC Hemoglobin Calc 12.6 g/dL    POC HCT 37 %PCV    POC Glucose 122 mg/dL    POC Ionized Calcium 1.16 1.11 - 1.30 mmol/L    POC Sodium 140 136 - 145 mmol/L    POC Potassium 3.7 3.5 - 5.0 mmol/L   POCT Post-op Cardiac   Result Value Ref Range    POC pH 7.226     POC pCO2 62.3 mmHg    POC pO2 61 mmHg    Base Exc, Art -2 mmol/L    POC TCO2 28 mmol/L    POC HCO3 25.8 mmol/L    POC O2 Sat 85 %    POC Hemoglobin Calc 10.2 g/dL    POC HCT 30 %PCV    POC Glucose 126 mg/dL    POC Ionized Calcium 1.03 1.11 - 1.30 mmol/L    POC Sodium 139 136 - 145 mmol/L    POC Potassium 4.5 3.5 - 5.0 mmol/L   POCT Post-op Cardiac   Result Value Ref Range    POC pH 7.280     POC pCO2 54.8 mmHg    POC pO2 361 mmHg    Base Exc, Art -1 mmol/L    POC TCO2 27 mmol/L    POC HCO3 25.7 mmol/L    POC O2 Sat 100 %    POC Hemoglobin Calc 11.6 g/dL    POC HCT 34 %PCV    POC Glucose 143 mg/dL    POC Ionized Calcium 0.95 1.11 - 1.30 mmol/L    POC Sodium 140 136 - 145 mmol/L    POC Potassium 5.4 3.5 - 5.0 mmol/L   POCT Post-op Cardiac    Result Value Ref Range    POC pH 7.310     POC pCO2 47.4 mmHg    POC pO2 276 mmHg    Base Exc, Art -2 mmol/L    POC TCO2 25 mmol/L    POC HCO3 23.9 mmol/L    POC O2 Sat 100 %    POC Hemoglobin Calc 8.2 g/dL    POC HCT 24 %PCV    POC Glucose 133 mg/dL    POC Ionized Calcium 1.23 1.11 - 1.30 mmol/L    POC Sodium 141 136 - 145 mmol/L    POC Potassium 5.0 3.5 - 5.0 mmol/L   INR   Result Value Ref Range    INR 1.54 (H) 0.90 - 1.10   APTT(PTT)   Result Value Ref Range    PTT 71 (H) 24 - 37 seconds   Fibrinogen   Result Value Ref Range    Fibrinogen 316 170 - 410 mg/dL   POCT Post-op Cardiac   Result Value Ref Range    POC pH 7.360     POC pCO2 42.3 mmHg    POC pO2 191 mmHg    Base Exc, Art -2 mmol/L    POC TCO2 25 mmol/L    POC HCO3 23.9 mmol/L    POC O2 Sat 100 %    POC Hemoglobin Calc 9.5 g/dL    POC HCT 28 %PCV    POC Glucose 143 mg/dL    POC Ionized Calcium 1.11 1.11 - 1.30 mmol/L    POC Sodium 143 136 - 145 mmol/L    POC Potassium 4.6 3.5 - 5.0 mmol/L   Crossmatch   Result Value Ref Range    Crossmatch Compatible     Blood Expiration Date 20190122235900     Unit Type O Pos     Unit Number [id number]     Status Ready     Component Red Blood Cells     PRODUCT CODE [id number]     Blood Type 5100     CODING SYSTEM [id number]    Crossmatch   Result Value Ref Range    Crossmatch Compatible     Blood Expiration Date 20190128235900     Unit Type O Pos     Unit Number [id number]     Status Ready     Component Red Blood Cells     PRODUCT CODE [id number]     Blood Type 5100     CODING SYSTEM [id number]    INR   Result Value Ref Range    INR 1.48 (H) 0.90 - 1.10   Magnesium   Result Value Ref Range    Magnesium 2.5 1.8 - 2.6 mg/dL   Calcium, Ionized, Measured   Result Value Ref Range    Calcium, Ionized Measured 1.02 (L) 1.11 - 1.30 mmol/L    Calcium, Ionized pH 7.4 1.01 (L) 1.11 - 1.30 mmol/L    pH 7.38 7.35 - 7.45   HM1 (CBC with Diff)   Result Value Ref Range    WBC 15.7 (H) 4.0 - 11.0 thou/uL    RBC 3.27 (L) 4.40 - 6.20  mill/uL    Hemoglobin 10.2 (L) 14.0 - 18.0 g/dL    Hematocrit 29.7 (L) 40.0 - 54.0 %    MCV 91 80 - 100 fL    MCH 31.2 27.0 - 34.0 pg    MCHC 34.3 32.0 - 36.0 g/dL    RDW 12.2 11.0 - 14.5 %    Platelets 189 140 - 440 thou/uL    MPV 9.1 8.5 - 12.5 fL    Neutrophils % 76 (H) 50 - 70 %    Lymphocytes % 19 (L) 20 - 40 %    Monocytes % 4 2 - 10 %    Eosinophils % 1 0 - 6 %    Basophils % 0 0 - 2 %    Neutrophils Absolute 11.7 (H) 2.0 - 7.7 thou/uL    Lymphocytes Absolute 3.0 0.8 - 4.4 thou/uL    Monocytes Absolute 0.6 0.0 - 0.9 thou/uL    Eosinophils Absolute 0.1 0.0 - 0.4 thou/uL    Basophils Absolute 0.0 0.0 - 0.2 thou/uL   EKG 12 lead   Result Value Ref Range    SYSTOLIC BLOOD PRESSURE  mmHg    DIASTOLIC BLOOD PRESSURE  mmHg    VENTRICULAR RATE 89 BPM    ATRIAL RATE 89 BPM    P-R INTERVAL 222 ms    QRS DURATION 138 ms    Q-T INTERVAL 400 ms    QTC CALCULATION (BEZET) 486 ms    P Axis 36 degrees    R AXIS 60 degrees    T AXIS -3 degrees    MUSE DIAGNOSIS       Electronic atrial pacemaker  Right bundle branch block  Possible Inferior infarct (cited on or before 26-DEC-2018)  Abnormal ECG  When compared with ECG of 10-KHARI-2019 10:15,  Electronic atrial pacemaker has replaced Sinus rhythm  Vent. rate has increased BY  33 BPM  T wave inversion no longer evident in Anterior leads     Blood Gases, Arterial   Result Value Ref Range    pH, Arterial 7.37 7.37 - 7.44    pCO2, Arterial 38 35 - 45 mm Hg    pO2, Arterial 410 (H) 80 - 90 mm Hg    Bicarbonate, Arterial Calc 22.6 (L) 23.0 - 29.0 mmol/L    O2 Sat, Arterial 100.0 (H) 96.0 - 97.0 %    Oxyhemoglobin 97.3 (H) 96.0 - 97.0 %    Base Excess, Arterial Calc -3.0 mmol/L    Ventilation Mode VCV     Rate 16 rr/min    FIO2 1.00     Peep 5 cm H2O    Sample Stabilized Temperature 37.0 degrees C    Ventilator Tidal Volume 400 mL       IMAGING:  Xr Chest 1 View Portable    Result Date: 1/11/2019  XR CHEST 1 VIEW PORTABLE 1/11/2019 4:44 PM INDICATION: S/p cabg COMPARISON: 01/10/2019  FINDINGS: Postop changes median sternotomy and CABG. Heart size slightly enlarged. Pulmonary vascularity normal. Subsegmental atelectasis left base. Mediastinal drains. No evidence for pneumothorax. Endotracheal tube tip 1.5 cm above the mae. Right IJ  Belle-Melvina catheter tip proximal right main pulmonary artery.      Total Critical Care Time : 45 Minutes    Raulito Minor MD  Pulmonary and Critical Care Medicine  [facility]  Cell [phone]  Office [phone]  Pager [phone]

## 2021-06-23 NOTE — TREATMENT PLAN
RCAT Treatment Plan      Patient Score: 6  Patient Acutiy: 2    Clinical Indication for Therapy: Prevent atelectasis.    Therapy Ordered: EZPAP two times a day, flutter valve two times a day.    Assessment Summary: PT was assessed per RCAT protocol. PT performed IS, flutter valve, and EZPAP therapy x1 and tolerated them well. BS are clear and diminished bilaterally. PT achieved a best inspiratory capacity of 1000mL on their IS. PT is on RA and their SpO2 appears stable. RT will switch EZPAP and flutter valve two times a day to PRN despite RCAT guidelines because the PT has shown improvement in ambulation and oxygenation. RT will continue to monitor PT closely.       01/18/19 0803   Reason for Assessment (RCAT)   RCAT Assesment Re-eval   Assessment Reason  Cardiac surgery   $ RCAT Eval Time 15 min.  Yes   Vitals (RCAT)   Heart Rate 62   Resp 18   SpO2 95 %   FiO2 (%) (Room air.)   Chart Assessment (RCAT)   Pulmonary Status  0   Surgical Status  3   Chest Xray  0   Patient Assessment (RCAT)    Respiratory Pattern  0   Mental Status  0   Breath Sounds  2   Cough Effectiveness  0   Level of Activity  1   O2 Required SpO2 >= 92%  0   Chart + Pt. Assessment Total Points  6   RCAT Acuity Score 6 (Acuity 2)   Clinical Indications (RCAT)   RCAT Order Placed  Yes;Not appropriate for therapy       FRANK JimenezT

## 2021-06-23 NOTE — PLAN OF CARE
.  Patient Name: Main Carney   MRN: [id number]   Date of Admission: 1/10/2019    Procedure: CORONARY ARTERY BYPASS x 5, LEFT ENDOSCOPIC VEIN HARVEST, INTERNAL MAMMARY ARTERY, EPIAORTIC ULTRASOUND, ANESTHESIA TRANSESOPHAGEAL ECHOCARDIOGRAM    Post Op day #:6    Subjective (Patient focus/Primary Problem for shift): Generalized body pain          Pain GoalNo pain Pain Rating6/10 at 0000           Pain Medication/ Regime effective to reduce patient painscheduled tylenol    Objective (Physical assessment):           Rhythm: sinus bradycardia with BBB            Bowel Activity: yes if Yes indicate when: 1/16          Bowel Medications: Pt refused 2100 stool softener            Incision: healing well          Incentive Spirometry Q 1-2 hour when awake:  yes Volume: 800          Epicardial Pacing Wires:  no            Patient Activity:           Up to chair for meals: yes          Ambulation with RN x2 (Not including CR): yes            Is patient in home clothes:no             Chest Tubes   Pleural: not applicable Draining: not applicable               Suction: not applicable              Mediastinal: not applicable Draining: not applicable               Suction: not applicable   Dressing Change Daily:no If No, why?                     Urinary Catheter: not applicable           Preventative WOC consult (need MD order): not applicable       Assessment (Nursing primary shift focus):  Pt is AxO, pain management this shift    Plan (Patient Care Plan/focus): Continue to encourage the use of IS       Amarilis Reyes   1/17/2019   5:44 AM

## 2021-06-23 NOTE — PROGRESS NOTES
CVTS Daily Progress Note   1/16/2019   POD # 5 s/p CABG x 5  DOS 1/11/2019 Dr. Otto  EF 63 %  A1C 5.8    Patient arrived to ICU from OR 1/11 afternoon. He was subsequently extubated and weaned from pressors. Transferred out of ICU on POD #1    Overnight events:  Up in chair, alert and smiling.      gout flair - right ankle - significant improvement in discomfort    Leukocytosis - Continues with downward trend.     Patient on path from surgical standpoint, but rehab impaired due to painful walking.      Maintaining oxygen saturations on RA   Normotensive   Pain  controlled. +BM /+ flatus.   Hgb 8.0.     Patient main concern: not feeling ready for discharge home today, thinks he maybe ready tomorrow.    Patient denies new chest pain, shortness of breath, abdominal pain, calf pain, nausea.  Visit was done with professional .      PLAN  Continue diuresis with oral lasix  Another day of therapy   Home in am if stable    Follow up  CV surg  Feb 5 with [facility]  Wednesday Feb 27 with Dr. JEIMY Herzog    OBJECTIVE:    Temp:  [97.6  F (36.4  C)-98.7  F (37.1  C)] 98.7  F (37.1  C)  Heart Rate:  [58-67] 67  Resp:  [16-18] 17  BP: (114-137)/(56-68) 137/66  Wt Readings from Last 2 Encounters:   01/16/19 0451 142 lb 12.8 oz (64.8 kg)   01/15/19 0610 146 lb 4.8 oz (66.4 kg)   01/14/19 0611 135 lb 9.6 oz (61.5 kg)   01/13/19 0530 147 lb 1.6 oz (66.7 kg)   01/12/19 0556 151 lb 6.4 oz (68.7 kg)   01/11/19 0630 141 lb 9.6 oz (64.2 kg)   01/10/19 1700 143 lb (64.9 kg)   01/10/19 0955 143 lb (64.9 kg)   01/03/19 1114 146 lb (66.2 kg)       Current Medications:    Scheduled Meds:    acetaminophen  650 mg Oral Q6H    Or     acetaminophen  650 mg Rectal Q6H     ascorbic acid (vitamin C)  500 mg Oral Daily with lunch     aspirin  81 mg Oral DAILY     atorvastatin  80 mg Oral QHS     bisacodyl  10 mg Rectal Once     carvedilol  12.5 mg Oral BID with meals     clopidogrel  75 mg Oral DAILY     colchicine  0.6 mg Oral  BID     docusate sodium  100 mg Oral BID     ferrous sulfate  325 mg Oral BID with meals     furosemide  40 mg Oral BID - diuretic     heparin (PF)  5,000 Units Subcutaneous Q8H FIXED TIMES     insulin aspart (NovoLOG) injection   Subcutaneous TID with meals     insulin aspart (NovoLOG) injection   Subcutaneous QHS     lidocaine  1 patch Transdermal DAILY     melatonin  3 mg Oral QHS     omeprazole  20 mg Oral QAM AC     polyethylene glycol  17 g Oral DAILY     potassium chloride ER  20 mEq Oral BID     predniSONE  40 mg Oral DAILY     sodium chloride bacteriostatic  1-5 mL Intradermal Once     sodium chloride  10 mL Intravenous Q8H FIXED TIMES       PRN Meds:.acetaminophen, acetaminophen, albumin human, aluminum-magnesium hydroxide-simethicone, bisacodyl, bisacodyl, dextrose 50 % (D50W), glucagon (human recombinant), magnesium hydroxide, naloxone **OR** naloxone, ondansetron, sodium chloride, sodium chloride, sodium phosphates 133 mL, traMADol, traZODone    Cardiographics:    Telemetry monitoring demonstrates SR with rates in the 70s per my personal review.    Imaging:    No results found.    Lab Results (most recent, reviewed):    Hemoglobin (g/dL)   Date Value   01/16/2019 8.0 (L)     WBC (thou/uL)   Date Value   01/16/2019 16.6 (H)     Potassium (mmol/L)   Date Value   01/16/2019 4.2     Creatinine (mg/dL)   Date Value   01/15/2019 0.73     Hemoglobin A1c (%)   Date Value   01/10/2019 5.8     Magnesium (mg/dL)   Date Value   01/16/2019 2.3     Calcium (mg/dL)   Date Value   01/15/2019 8.4 (L)     142 lb 12.8 oz (64.8 kg)  Admit weight  64.8 kg     Physical Exam:    General: Patient seen in Chair. NAD  Much more awake and conversant today.  Professional     Wife in room, her questions address also.    CV: RRR on monitor.   Pulm: Non-labored effort on RA   Incision  C/D/I.  Abd: Soft, NT, ND +BM  : Voiding   Ext: Mild pedal edema, SCDs in place, warm- less pain right ankle  Neuro: CNs grossly intact.        ASSESSMENT/PLAN:    Main Carney is a 63 y.o. male with a history of HTN who is now s/p CABG x 5 .    Principal Problem:    Angina, class III (H)  Active Problems:    Unstable angina (H)    Hyperlipidemia LDL goal <70    S/P CABG (coronary artery bypass graft)    On mechanically assisted ventilation (H)      NEURO:   - Scheduled Tylenol and PRN Tramadol for pain  - Oxycodone made patient too sleepy  - Melatonin QHS    CV:   - Normotensive  - Coreg 12.5 mg -HR at goal 70s   - Lasix 40 po 2 times a day  - Kdur 20 two times a day  -- ASA 81mg  - Atorvastatin 80mg daily    PULM:   - Maintaining oxygen saturations on RA  - Encourage pulmonary toilet-pulling 500-750 on IS    FEN/GI:  - Continue electrolyte replacement protocol  -K+ supplement   - Diet: Cardiac, ADAT   - Bowel regimen +BM    RENAL:  - Adequate UOP/hr.   - Cr 0.73    HEME:  - Acute blood loss anemia post-op -   - stable hgb 8.3  - Supplemental Vit C and fe   - Hep SQ, [id number]  - Leukocytosis - 17.0 down from 19.6    ID:  - Lisa op ppx complete, afebrile  -  No concerns for infection    ENDO:   -SSI  PPx:   - DVT: SCDs, SQ heparin three times a day   - GI: Omeprazole  - Ambulating with therapy  CR - difficulty standing with painful ankle     DISPO:   -telemetry floor   Home in am if stable.

## 2021-06-23 NOTE — PLAN OF CARE
Patient will maintain patent airway Progressing      Continued pain/discomfort management Progressing      Patient Name: Main Carney   MRN: [id number]   Date of Admission: 1/10/2019    Procedure: CORONARY ARTERY BYPASS x 5, LEFT ENDOSCOPIC VEIN HARVEST, INTERNAL MAMMARY ARTERY, EPIAORTIC ULTRASOUND, ANESTHESIA TRANSESOPHAGEAL ECHOCARDIOGRAM    Post Op day #:3    Subjective (Patient focus/Primary Problem for shift): Rest, monitor rhythm          Pain Goalno pain Pain Rating4           Pain Medication/ Regime effective to reduce patient painscheduled tylenol    Objective (Physical assessment):           Rhythm: normal sinus rhythm, burst of afib, sinus loren w/ 2nd degree type II            Bowel Activity: no if Yes indicate when:            Bowel Medications: not applicable            Incision: healing well          Incentive Spirometry Q 1-2 hour when awake:  yes Volume:            Epicardial Pacing Wires:  no            Patient Activity:           Up to chair for meals: not applicable          Ambulation with RN x2 (Not including CR): not applicable            Is patient in home clothes:no             Chest Tubes   Pleural: no Draining: not applicable               Suction: not applicable              Mediastinal: no Draining: not applicable               Suction: not applicable   Dressing Change Daily:yes If No, why?                      Urinary Catheter: no           Preventative WOC consult (need MD order): no       Assessment (Nursing primary shift focus): Pt has slept well this shift.  See previous notes for rhythm changes.  All other vitals stable. On room air, sats in 90s.  Has some incisional pain and pain in his rt knee, scheduled tylenol is giving relief.    Plan (Patient Care Plan/focus): Monitor heart rhythm.      Mellissa Soriano   1/14/2019   3:33 AM

## 2021-06-23 NOTE — PROGRESS NOTES
Intensivist update    Patient doing well, off drips, hemodynamically stable.  CV surgery primarily managing  Critical care service will sign off.  Please call with additional questions.    Ivan (Emanuel) MD Nancy  [facility]  Pager [phone]  Clinic [phone]

## 2021-06-23 NOTE — PLAN OF CARE
Pt starting to aixa po clears and meds slowly. Denies pain most of shift. aixa up chair and up with rehab.lines and drips D/Cd. Family at bedside

## 2021-06-23 NOTE — PROGRESS NOTES
[facility] Medicine Daily Progress Note    Assessment & Plan: Main Carney is a 63 y.o. male with a past medical history of HTN, unstable angina admitted for severe multivessal coronary artery disease POD#1 from CABGx5.     Principal Problem:    Angina, class III (H)  Active Problems:    Unstable angina (H)    Hyperlipidemia LDL goal <70    S/P CABG (coronary artery bypass graft)    On mechanically assisted ventilation (H)      Severe multivessel CAD  Unstable angina  S/p CABGx5 : History of progressive angina with dyspnea on exertion. EKG shows first degree AV block, RBBB and Q waves in the inferior leads. 1/1019 cardiac cath with severe multivessel disease. ECHO shows normal EF of 63% with no WMA or valvular disease. Carotid US without significant stenosis.  Postop day 1 from CABG.  Patient was extubated successfully yesterday without any issue.  Patient off of all blood pressure support at this time.  ICU has signed off.  CV surgeon okay to stepdown from ICU to cardiac telemetry.  - ASA 81mg daily  - Atorvastatin 40 mg  - Carvedilol 12.5 mg two times a day  - Lasix 40mg IV three times a day per CV surgery  - SSI per CV surgery  - CV surgery and cardiology following     HTN: Blood pressures have been borderline elevated. Pt states he has been on BP meds in the past, but does not recall the names. He is also intermittently bradycardic, likely secondary to beta blocker.   - Carvedilol as above.         Diet: cardiac diet following   Fluids: No fluids   VTE Prophylaxis: None at this time    Discharge Planning discussed with patient  Barriers to discharge: pending CABG   Anticipated discharge day: 1/14/19  Disposition:  SNF  Status: inpatient .admit  Length of Stay: 2     Patient discussed with attending [facility] Medicine physician, Dr.Manuel Perez, who agrees with the plan.     Prakash Wills PGY3 1/12/2019  [facility]   Pager: [phone] (from 8AM-5:30PM)    This is a [facility]  Medicine Patient.  Please call the senior pager with any questions or concerns, 24/7.  [phone]    Subjective/Interval events:  And is currently fatigued at this time.  He is answering questions appropriately however.  Patient currently having some mild chest soreness and some chest pain with deep inspiration.  Patient denies any chest pressure, shortness of breath, abdominal pain, nausea, vomiting.    ROS: Negative unless noted above.    Objective  Vital signs in last 24 hours  Temp:  [97.1  F (36.2  C)-101.2  F (38.4  C)] 99.2  F (37.3  C)  Heart Rate:  [69-90] 69  Resp:  [16-25] 19  BP: ()/(51-72) 113/63  Arterial Line BP: ()/() 106/47  FiO2 (%):  [40 %-100 %] 40 %  151 lb 6.4 oz (68.7 kg)  Physical Exam  General appearance: alert, appears stated age, cooperative and no distress  Head: Normocephalic, without obvious abnormality, atraumatic  Eyes: conjunctivae/corneas clear.  EOM's intact.  Neck: Right IJ in place.  Lungs: clear to auscultation bilaterally  Heart: regular rate and rhythm, S1, S2 normal, no murmur, click, rub or gallop   Chest: Well-healing sternotomy without any drainage or spreading erythema.  Chest tubes in place.  Abdomen: soft, non-tender; bowel sounds normal; no masses,  no organomegaly  Extremities: Left lower extremity bandaged from vein harvest site, nontender to palpation.  Trace edema in bilateral lower extremities.  Pulses: 2+ and symmetric  Skin: Skin color, texture, turgor normal. No rashes or lesions    Intake/Output last 3 shifts  I/O last 3 completed shifts:  In: 4531.2 [P.O.:50; I.V.:3401.2; Blood:330; IV Piggyback:750]  Out: 2574 [Urine:1664; Blood:500; Chest Tube:410]  Pertinent Labs   Results from last 7 days   Lab Units 01/12/19  0340 01/11/19  1733 01/11/19  0922   LN-WHITE BLOOD CELL COUNT thou/uL 11.0 15.7* 13.5*   LN-HEMOGLOBIN g/dL 8.1* 10.2* 14.0   LN-HEMATOCRIT % 24.6* 29.7* 40.2   LN-PLATELET COUNT thou/uL 156 189 372     Results from last 7 days    Lab Units 01/12/19  0340 01/12/19  0127 01/11/19  2045 01/11/19  1733 01/11/19  0922   LN-SODIUM mmol/L 144  --   --  141 136   LN-POTASSIUM mmol/L 4.1 4.5 3.7 4.2 3.7   LN-CHLORIDE mmol/L 115*  --   --  115* 104   LN-CO2 mmol/L 22  --   --  22 25   LN-BLOOD UREA NITROGEN mg/dL 14  --   --  12 14   LN-CREATININE mg/dL 0.76  --   --  0.65* 0.76   LN-CALCIUM mg/dL 8.1*  --   --  7.5* 9.4     Results from last 7 days   Lab Units 01/12/19  0339 01/11/19  1733 01/11/19  1620   LN-INR  1.35* 1.48* 1.54*   LN-PARTIAL THROMBOPLASTIN TIME seconds  --   --  71*     Pertinent Radiology    Xr Chest 2 Views    Result Date: 1/10/2019  XR CHEST 2 VIEWS 1/10/2019 4:34 PM INDICATION: Pre op cabg COMPARISON: Chest x-ray 12/26/2018 FINDINGS: No pneumothorax. Mild pulmonary hyperinflation. The lungs are clear and there are no pleural effusions. Stable upper normal heart size. No overt vascular congestion.    Xr Chest 2 Views    Result Date: 12/26/2018  XR CHEST 2 VIEWS 12/26/2018 2:45 PM INDICATION: Sob, harmon COMPARISON: None. FINDINGS: Trace right pleural effusion or pleural scar. Heart size appears normal. Lungs appear clear.    Us Carotid Bilateral    Result Date: 1/10/2019  US CAROTID BILATERAL 1/10/2019 4:31 PM INDICATION: Pre op evaluation. TECHNIQUE: Duplex exam performed utilizing 2D gray-scale imaging, Doppler interrogation with color-flow and spectral waveform analysis. COMPARISON: None. FINDINGS: RIGHT: There is a moderate amount of atheromatous plaque. Normal waveforms with no significant stenosis. LEFT: There is a moderate amount of atheromatous plaque. Normal waveforms with no significant stenosis. Both vertebral arteries and subclavian artery waveforms are normal. VELOCITY CHART: The following velocities were obtained in the RIGHT carotid system. CCA: 71/12 cm/s ICA: 79/25 cm/s ECA: 106/7 cm/s ICA/CCA: PS 1.11 The following velocities were obtained in the LEFT carotid system. CCA: 75/26 cm/s ICA: 87/31 cm/s ECA: 96/16  cm/s ICA/CCA: PS 1.16                            CONCLUSION: 1.  Mild atheromatous plaque in the carotid arteries. 2.  No significant stenosis on either side. Evaluation based on velocities and NASCET criteria.    Current Medications.     acetaminophen  650 mg Oral Q6H    Or     acetaminophen  650 mg Rectal Q6H     aspirin  81 mg Oral DAILY     atorvastatin  40 mg Oral QHS     bisacodyl  10 mg Oral Once     [START ON 1/14/2019] bisacodyl  10 mg Rectal Once     calcium chloride IVPB  0.34 g Intravenous Once     chlorhexidine  15 mL Topical Q12H 09-21     docusate sodium  100 mg Oral BID     furosemide  40 mg Intravenous TID     heparin (PF)  5,000 Units Subcutaneous Q8H FIXED TIMES     insulin aspart (NovoLOG) injection   Subcutaneous TID with meals     insulin aspart (NovoLOG) injection   Subcutaneous QHS     lidocaine (PF)  1-5 mL Intradermal Once     [START ON 1/13/2019] magnesium hydroxide  30 mL Oral DAILY     magnesium sulfate IVPB  2 g Intravenous Once     melatonin  3 mg Oral QHS     omeprazole  20 mg Oral QAM AC     polyethylene glycol  17 g Oral DAILY     potassium chloride ER  20 mEq Oral BID     sodium chloride bacteriostatic  1-5 mL Intradermal Once     PRN:acetaminophen, acetaminophen, albumin human, aluminum-magnesium hydroxide-simethicone, [START ON 1/13/2019] bisacodyl, [START ON 1/14/2019] bisacodyl, dextrose 50 % (D50W), DOPamine, glucagon (human recombinant), HYDROmorphone, [START ON 1/13/2019] magnesium hydroxide, naloxone **OR** naloxone, norepinephrine IV infusion in D5W, ondansetron, oxyCODONE, sodium chloride, sodium phosphates 133 mL, traZODone, vasopressin    This note was created with help of Dragon dictation system. Grammatical /typing errors are not intentional.   1/12/2019 1/12/2019  [facility] Faculty Attestation  I have seen and examined the patient.  I have discussed the case with the resident physician(s), Dr. Wills  I agree with the findings, assessment and plan.    Aakash Perez  MD

## 2021-06-23 NOTE — PROGRESS NOTES
"Blood pressure 141/85, pulse 90, temperature 98.1  F (36.7  C), temperature source Oral, resp. rate 16, height 5' 1\" (1.549 m), weight 141 lb 9.6 oz (64.2 kg), SpO2 100 %.      Vent Mode: VCV  FiO2 (%):  [100 %] 100 %  S RR:  [16] 16  S VT:  [400 mL] 400 mL  PEEP/CPAP (cm H2O):  [5 cm H2O] 5 cm H2O  Minute Ventilation (L/min):  [8.4 L/min] 8.4 L/min  PIP:  [33 cm H2O] 33 cm H2O  MAP (cm H2O):  [10] 10    Pt arrived to ICU and placed on above settings.  Will draw abg in 45 minutes.  Plan to wean and extubate when able. RT will continue to monitor.   "

## 2021-06-23 NOTE — PROGRESS NOTES
Nikobev S Carney has participated in 1 sessions of Phase II Cardiac Rehab.    Progress Report:   Cardiac Rehab Treatment Progress Report 1/16/2019 1/17/2019 1/18/2019 1/19/2019 1/23/2019   Weight 142 lbs 13 oz 144 lbs 14 oz 141 lbs 11 oz 139 lbs 139 lbs   Heart Rate - - - - 52   Post Exercise BP - - - - 132/83   ECG - - - - A-fib   Total Exercise Minutes - - - - 6         Current Status:  Therapists Comments: Patient attended 1 session of Cardiac Rehab. Patient walked 400 feet with a walker and c/o fatigue and shortness of breath. Patient's heart rate ranged from . Patient will attend Cardiac Rehab again on 1/24/2019 at 11:00.    If Physician recommends change in treatment plan, please place orders.        __________________________________________________      _____________  Signature                                                                                                  Date

## 2021-06-23 NOTE — PROGRESS NOTES
Pt stable upon transfer to PACU. VSS. NSR w/ BBB. NS running at 25/hr. BS was 113. Coreg given as per orders. Pt is completely A&O. BM this am. Voiding in BR. Independent in cares.  present for assessment. Anesthesia consent complete and on chart. Surgical consent filed out, going down with pt to be signed with surgeon and . Attempted to call report to PACU,waiting for RN to call back    Yolette Rajput RN

## 2021-06-23 NOTE — PROGRESS NOTES
"Clinical Nutrition Therapy Assessment Note      Reason for Assessment:   Main Carney is a 63 y.o. male assessed by the registered Dietitian for consult s/p CABG.    Nutrition History:  Pt is Hmong-speaking.  Full nutrition hx will be addressed when pt educated once out of ICU.      Current Nutrition Prescription:   Diet: CL, no carb  IV dextrose or Fluids:  DOPamine    epinephrine Last Rate: Stopped (01/12/19 0220)   insulin infusion (1 unit/mL) Last Rate: Stopped (01/11/19 2358)   niCARdipine Last Rate: Stopped (01/11/19 2230)   norepinephrine IV infusion in D5W    sodium chloride 0.9% Last Rate: 50 mL/hr (01/11/19 2200)   sodium chloride 0.9% Last Rate: 25 mL/hr (01/11/19 2200)   vasopressin        Current Nutrition Intake:  Not applicable    Anthropometrics:  Height: 5' 1\" (154.9 cm)  Admission weight:143#  Weight: 151 lb 6.4 oz (68.7 kg); fluid up 2.8 L per I/Os  BMI (Calculated): 27  BMI indication: 25-29.9 overweight  Ideal body weight 112#  % Ideal body weight 128%  Weight History:  146# 1/3/19    RD Nutrition Focused Physical Exam:  The patient has the following physical signs which could indicate malnutrition: none    GI Status/Output:   Bowel Sounds absent per nurse  Last BM: 1/11/19 per nurse    Skin/Wound:  Marlon score Marlon Scale Score: 12    Medications:  Medications reviewed.    Labs:  Labs reviewed.  BG of 22 was contaminated sample.    Estimated Nutrition Needs:  Assessment weight is 50 kg, ideal weight    Energy Needs: 0458-4227 kcals daily, 22-25 kcal/kg  Protein Needs: 60-70 g daily, 1.2-1.4 g/kg.    Malnutrition: Not noted    Nutrition Risk Level: low risk    Nutrition dx:   Food and nutrition-related knowledge deficit r/t CV surgery.     Goal:   Heart healthy nutrition education prior to discharge with f/u at outpatient cardiac rehab.    Monitoring/Evaluation:   Readiness for diet education.    Electronically signed by:  Tiffany Neri RD  "

## 2021-06-23 NOTE — PLAN OF CARE
Problem: Discharge Barriers  Goal: Patient's discharge needs are met  Outcome: Progressing  Care Plan  Care Management      Care Management Goals of the Day: Progression of care    Care Progression Reviewed With: Charge RN, MD, HUC, RNCM, CMSW    Barriers to Discharge: IV lasix    Discharge Disposition: home    Expected Discharge Date: 01/19/19      Transportation: family      Care Coordination Narrative: Pt will d/c home with outpatient CR. Family to transport. No other d/c needs.

## 2021-06-23 NOTE — PATIENT INSTRUCTIONS - HE
S/P CABG x 5, with LIMA-LAD, rSVG-Diag2, rSVG-OM2, bBUE-Hzku-FGJ-Mid-PDA, EVH from [facility] on 01/11/2019  Discharge to home on 01/19/2019 and presents today with his son for a postop CV surgery follow up  Denies chest pain and or shortness of breath but has occasional sternal incisional sensitivity, incisions are healing well, lung sounds clear and no sternal instability is appreciated  Appetite is good, bowel movement is regular and no voiding difficulties reported  Patient saw his PCP Chetna Fry MD on within a week of hospital discharged  Started outpatient cardiac rehab on 01/23/2019 and is tolerating well  May start driving by 02/11/2019  Patient is a  for charted school and may return to work on 04/11/2019  Otherwise doing well and will not need any further CV surgery follow up, but advised to call with questions or concerns

## 2021-06-23 NOTE — PROGRESS NOTES
CVTS Daily Progress Note   1/12/2019   POD #1 s/p CABG x 5  DOS 1/11/2019 Dr. Otto  EF 63 %  A1C 5.8    Patient arrived to ICU from OR yesterday afternoon. He was subsequently extubated and weaned from pressors.     Overnight events:  No acute events overnight.     Patient progressing well.   Maintaining oxygen saturations on 3L/nc. Normotensive on no pressors.    Pain  controlled. -BM / flatus.     Tolerating diet clear liquids. UOP adequate. Chest tube output appropriate. Hgb 8.1. Patient denies new chest pain, shortness of breath, abdominal pain, calf pain, nausea. Patient has no questions today.  IV access - difficult    PLAN  SSI  Transfer out of ICU  Replace mag and calcium  Lasix 40 tid  K+ supplementation  BB 12.5 mg   Supplemental Vit c and fe  Mid line IV    Keep garces and CT another day    OBJECTIVE:    Temp:  [97.1  F (36.2  C)-101.2  F (38.4  C)] 99.2  F (37.3  C)  Heart Rate:  [68-90] 68  Resp:  [16-25] 20  BP: ()/(51-72) 125/72  Arterial Line BP: ()/() 106/47  FiO2 (%):  [40 %-100 %] 40 %  Wt Readings from Last 2 Encounters:   01/12/19 0556 151 lb 6.4 oz (68.7 kg)   01/11/19 0630 141 lb 9.6 oz (64.2 kg)   01/10/19 1700 143 lb (64.9 kg)   01/10/19 0955 143 lb (64.9 kg)   01/03/19 1114 146 lb (66.2 kg)       Current Medications:    Scheduled Meds:    acetaminophen  650 mg Oral Q6H    Or     acetaminophen  650 mg Rectal Q6H     ascorbic acid (vitamin C)  500 mg Oral Daily with lunch     aspirin  81 mg Oral DAILY     atorvastatin  40 mg Oral QHS     bisacodyl  10 mg Oral Once     [START ON 1/14/2019] bisacodyl  10 mg Rectal Once     calcium chloride IVPB  0.34 g Intravenous Once     chlorhexidine  15 mL Topical Q12H 09-21     docusate sodium  100 mg Oral BID     ferrous sulfate  325 mg Oral BID with meals     furosemide  40 mg Intravenous TID     heparin (PF)  5,000 Units Subcutaneous Q8H FIXED TIMES     insulin aspart (NovoLOG) injection   Subcutaneous TID with meals     insulin  aspart (NovoLOG) injection   Subcutaneous QHS     lidocaine (PF)  1-5 mL Intradermal Once     lidocaine  1 patch Transdermal DAILY     [START ON 1/13/2019] magnesium hydroxide  30 mL Oral DAILY     magnesium sulfate IVPB  2 g Intravenous Once     melatonin  3 mg Oral QHS     metoprolol tartrate  12.5 mg Oral BID     omeprazole  20 mg Oral QAM AC     polyethylene glycol  17 g Oral DAILY     potassium chloride ER  20 mEq Oral BID     sodium chloride bacteriostatic  1-5 mL Intradermal Once     Continuous Infusions:    DOPamine       epinephrine Stopped (01/12/19 0220)     insulin infusion (1 unit/mL) Stopped (01/11/19 2358)     niCARdipine Stopped (01/11/19 2230)     norepinephrine IV infusion in D5W       sodium chloride 0.9% 50 mL/hr (01/11/19 2200)     sodium chloride 0.9% 25 mL/hr (01/11/19 2200)     vasopressin       PRN Meds:.acetaminophen, acetaminophen, albumin human, aluminum-magnesium hydroxide-simethicone, [START ON 1/13/2019] bisacodyl, [START ON 1/14/2019] bisacodyl, dextrose 50 % (D50W), DOPamine, glucagon (human recombinant), HYDROmorphone, [START ON 1/13/2019] magnesium hydroxide, naloxone **OR** naloxone, norepinephrine IV infusion in D5W, ondansetron, oxyCODONE, sodium chloride, sodium phosphates 133 mL, traZODone, vasopressin    Cardiographics:    Telemetry monitoring demonstrates SR with rates in the 70s per my personal review.    Imaging:    Xr Chest 1 View Portable    Result Date: 1/12/2019  XR CHEST 1 VIEW PORTABLE 1/12/2019 5:33 AM INDICATION: S/p cabg COMPARISON: Chest x-ray 01/11/2019 FINDINGS: Interval removal of the previously seen endotracheal tube. Stable right IJ Padroni-Melvina catheter and mediastinal drain. No definite pneumothorax. Persistent low lung volumes. Increased diffuse interstitial coarsening with some superimposed opacities likely reflecting pulmonary edema. Increased cardiomegaly and central vascular congestion. No definite pleural effusions.    Xr Chest 1 View Portable    Result  Date: 1/11/2019  XR CHEST 1 VIEW PORTABLE 1/11/2019 4:44 PM INDICATION: S/p cabg COMPARISON: 01/10/2019 FINDINGS: Postop changes median sternotomy and CABG. Heart size slightly enlarged. Pulmonary vascularity normal. Subsegmental atelectasis left base. Mediastinal drains. No evidence for pneumothorax. Endotracheal tube tip 1.5 cm above the mae. Right IJ  [place]-Melvina catheter tip proximal right main pulmonary artery.      Lab Results (most recent, reviewed):    Hemoglobin (g/dL)   Date Value   01/12/2019 8.1 (L)     WBC (thou/uL)   Date Value   01/12/2019 11.0     Potassium (mmol/L)   Date Value   01/12/2019 4.1     Creatinine (mg/dL)   Date Value   01/12/2019 0.76     Hemoglobin A1c (%)   Date Value   01/10/2019 5.8     Magnesium (mg/dL)   Date Value   01/12/2019 2.1     Calcium (mg/dL)   Date Value   01/12/2019 8.1 (L)       151 lb 6.4 oz (68.7 kg)  Admit weight  64.8 kg     UOP: 1.6L/24 hours  CT: 410 cc/24     Physical Exam:    General: Patient seen in bed. NAD. Family available for interpreting for visit   CV: RRR on monitor.   Pulm: Non-labored effort on 3L/nc.   Chest tubes in place, serosanguinous output, no air leak.    Incision  C/D/I.  Abd: Soft, NT, ND  : Harrison with reji urine  Ext: Mild pedal edema, SCDs in place, warm  Neuro: CNs grossly intact.       ASSESSMENT/PLAN:    Main Carney is a 63 y.o. male with a history of HTN who is now s/p CABG x 5 .    Principal Problem:    Angina, class III (H)  Active Problems:    Unstable angina (H)    Hyperlipidemia LDL goal <70    S/P CABG (coronary artery bypass graft)    On mechanically assisted ventilation (H)      NEURO:   - Scheduled Tylenol and PRN oxycodone for pain  - Melatonin QHS    CV:   - Normotensive  - Metoprolol 12.5 mg  - Lasix 40 three times a day  - Kdur 20 two times a day  -- ASA 81mg  - Atorvastatin 40mg daily  - Chest tubes to remain today    PULM:   - Vent weaning as able  - Maintaining oxygen saturations on 3L/nc  - Encourage pulmonary  toilet-pulling 500-750 on IS    FEN/GI:  - Continue electrolyte replacement protocol  - Diet: Cardiac, ADAT   - Bowel regimen    RENAL:  - Adequate UOP/hr. Continue to monitor closely via garces.   - Cr 0.76  - Garces to remain in for close monitoring of I/O and during period of diuresis/relative immobility.   - Diuresis with 40mg IV Lasix three times a day    HEME:  - Acute blood loss anemia post-op - follow hgb  - Supplemental Vit C and fe   - Hgb stable, no bleeding concerns.   - Hep SQ, [id number]    ID:  - Lisa op ppx complete, afebrile  -  No concerns for infection    ENDO:   - Transition to SSI  PPx:   - DVT: SCDs, SQ heparin three times a day   - GI: Omeprazole  - Ambulating with therapy  CR to see today.    DISPO:   - Transfer to general telemetry floor

## 2021-06-23 NOTE — PROGRESS NOTES
Pt was instructed on flutter value for 5 min and demonstrated good effort, Pulled an IS valve 1000 ml.  Pt was encouraged to cough with the bronchial hygiene therapy. RT will continue  to follow.    JUAN Encinas

## 2021-06-23 NOTE — PROGRESS NOTES
"Progress Note    Assessment/Plan  S/P CABG X 5 POD # 3   Awake and alert, out of bed to chair at time of evaluation with no apparent distress  Denies incisional chest pain, but complaint of right knee pain, swelling appreciated  AVSS, sinus per monitor  Postop pulmonary insufficiency resolved, now on room air saturation 94%  Acute blood loss anemia, Hgb 8.9 up from 8.4, will monitor closely  Leukocytosis, WBC 19.6, up from 11.0 two days ago, no fevers, will increase pulmonary toilet  Hyperlipidemia  Incisions are clean dry and intact, some diminished bilaterally  Abdomen soft and nontender  Urine output adequate with diuresis, no BM yet  Weight 61.5 kg, preop weight 64.2 kg  Continue pulmonary toilet and mobilize and ambulate in the hallway  We will monitor leukocytosis closely and start antibiotic therapy if patient develops a fever  Home in 24-48 hours      Subjective  Denies incisional chest pain, the complaint of right knee pain, no shortness of breath   Objective  Awake and alert, out of bed to chair with no apparent distress    Vital signs in last 24 hours  Temp:  [98  F (36.7  C)-99.9  F (37.7  C)] 99.7  F (37.6  C)  Heart Rate:  [] 100  Resp:  [17-22] 18  BP: (105-136)/(57-83) 129/67  Weight:   135 lb 9.6 oz (61.5 kg)    Intake/Output last 3 shifts  I/O last 3 completed shifts:  In: 640 [P.O.:640]  Out: 2000 [Urine:2000]  Intake/Output this shift:  No intake/output data recorded.    Review of Systems   A 12 point comprehensive review of systems was negative except as noted.    Physical Exam  /67 (Patient Position: Sitting)   Pulse 100   Temp 99.7  F (37.6  C) (Oral)   Resp 18   Ht 5' 1\" (1.549 m)   Wt 135 lb 9.6 oz (61.5 kg)   SpO2 94%   BMI 25.62 kg/m    HRR, incisions are clean dry and intact lung sounds diminished in the bases  Abdomen soft and nontender  Mild left lower extremity edema from EVH    Pertinent Labs   Lab Results: personally reviewed.   Lab Results   Component Value Date "     (L) 01/14/2019    K 3.3 (L) 01/14/2019    CL 99 01/14/2019    CO2 28 01/14/2019    BUN 17 01/14/2019    CREATININE 0.74 01/14/2019    CALCIUM 8.4 (L) 01/14/2019     Lab Results   Component Value Date    WBC 19.6 (H) 01/14/2019    HGB 8.9 (L) 01/14/2019    HCT 26.2 (L) 01/14/2019    MCV 92 01/14/2019     01/14/2019       Pertinent Radiology   Radiology Results: Not yet available for review and Awaiting two-view chest x-ray  EKG Results: personally reviewed.  and Sinus rhythm per monitor    Drew Brink

## 2021-06-23 NOTE — PLAN OF CARE
Breathing      Patient will maintain patent airway Progressing      Patient will utilize incentive spirometer Progressing          Pt on room air, SpO2 high 90s.  HR 79, RR 18, BBS clear and diminished.  Pt breathing shallow. Pt achieved 750 ml of IS and tolerated flutter valve x2 well.  Pt had a small leak around mouth piece while performing EZpap x2. Will try mask instead of mouthpiece. Wife mentioned pt appeared tired and lethargic. Pt resting in bed at this time. Rt following.    Lacie Kohli, LRT

## 2021-06-23 NOTE — PLAN OF CARE
Acute pain with Coronary Artery Disease (CAD)      Patient and nurse identification of acute coronary pain Progressing        Daily Care      Daily care needs are met Progressing        Day of Surgery      Patient/Family/Caregiver demonstrates understanding of restraint use/application and pain managment Progressing      Set pain goal expectations and achieve pain/discomfort control Progressing      Mobility/activity is initiated Progressing      Patient will maintain patent airway Progressing      Day of surgery nutrition Progressing      Maintain hemodynamics Progressing        Discharge Barriers      Patient's discharge needs are met Progressing        Knowledge Deficit      Patient/family/caregiver demonstrates understanding of disease process, treatment plan, medications, and discharge instructions Progressing        Pain      Patient's pain/discomfort is manageable Progressing        Potential for Falls      Patient will remain free of falls Progressing        Potential for hemodynamic instability      Cardiac output is adequate Progressing        Risk of myocardial ischemia or infarction      Early detection of myocardial ischemia/infarction Progressing        Safety      Patient will be injury free during hospitalization Progressing

## 2021-06-23 NOTE — PROGRESS NOTES
Respiratory Care:     RCAT done today, patient scored acuity level of 2. Incentive spirometer continued to prevent atelectasis; flutter valve continued for mucus clearing. Patient has been consistently scoring only 500mL with the IS. We will start EZ-PAP for lung expansion also. Breath sounds are diminished throughout, patient breathing between 16-18 bpm, SpO2 remained mid 90's on room air. Will continue to monitor.     Sameer LILIA French, LRT        01/14/19 0846   Reason for Assessment (RCAT)   RCAT Assesment Re-eval   Assessment Reason  Thoracic surgery   $ RCAT Eval Time 15 min.  Yes   Chart Assessment (RCAT)   Pulmonary Status  0   Surgical Status  3   Chest Xray  0   Patient Assessment (RCAT)    Respiratory Pattern  0   Mental Status  0   Breath Sounds  2   Cough Effectiveness  2   Level of Activity  0   O2 Required SpO2 >= 92%  0   Chart + Pt. Assessment Total Points  7   RCAT Acuity Score 7 (Acuity 2)   Clinical Indications (RCAT)   Broncho-Pulmonary Therapy Pt. unable to deep breath and cough spontaneously   Volume Expansion Therapy Prevent atelectasis   RCAT Order Placed  Yes

## 2021-06-23 NOTE — PROGRESS NOTES
RCAT Treatment Plan    Patient Score: 4    Patient Acuity: 1    Clinical Indication for Therapy: Prevent atelectasis    Therapy Ordered: Flutter valve two times a day and EZPAP four times a day    Assessment Summary: Pt is achieving 800 ml on IS, Pre op, he was achieving 1600 on IS. Plan is to switch metaneb and flutter valve to two times a day and re-evaluate in two days.      Crystal Carroll, LRT  1/16/2019

## 2021-06-23 NOTE — PLAN OF CARE
Acute pain with Coronary Artery Disease (CAD)      Patient and nurse identification of acute coronary pain Progressing        Daily Care      Daily care needs are met Progressing        Discharge Barriers      Patient's discharge needs are met Progressing        Knowledge Deficit      Patient/family/caregiver demonstrates understanding of disease process, treatment plan, medications, and discharge instructions Progressing        Pain      Patient's pain/discomfort is manageable Progressing        POD 4      Patient/Family/Caregiver demonstrates understanding of discharge plan of care Progressing      Discharge pain/discomfort management Progressing      Post discharge mobility/activity Progressing      Patients nutritional intake is adequate upon discharge Progressing      Patient will understand discharge meds Progressing        Potential for Falls      Patient will remain free of falls Progressing        Psychosocial Needs      Demonstrates ability to cope with hospitalization/illness Progressing      Collaborate with patient/family/caregiver to identify patient specific goals for this hospitalization Progressing        Safety      Patient will be injury free during hospitalization Progressing          Pt VSS and was free of falls during shift.  Pt complains of R knee and foot pain secondary to gout, treated w/ scheduled colchicine and Tylenol.  Pt is POD4 CABGx5.  NSR w/ BBB and is on RA.  Will continue to monitor.      Carmine Nelson RN  5:14 AM  1/15/2019

## 2021-06-23 NOTE — PLAN OF CARE
Patient Name: Main Carney   MRN: [id number]   Date of Admission: 1/10/2019    Procedure: CORONARY ARTERY BYPASS x 5, LEFT ENDOSCOPIC VEIN HARVEST, INTERNAL MAMMARY ARTERY, EPIAORTIC ULTRASOUND, ANESTHESIA TRANSESOPHAGEAL ECHOCARDIOGRAM    Post Op day #:1    Subjective (Patient focus/Primary Problem for shift): manage pain          Pain Goal 0 Pain Rating 5 (but drowsy when stating pain)           Pain Medication/ Regime effective to reduce patient pain scheduled tylenol, PRN tramadol available     Objective (Physical assessment):           Rhythm: normal sinus rhythm w/ 1st degree and BBB            Bowel Activity: no if Yes indicate when:           Bowel Medications: yes            Incision: healing well          Incentive Spirometry Q 1-2 hour when awake:  yes Volume: 500          Epicardial Pacing Wires:  yes            Patient Activity:           Up to chair for meals: yes          Ambulation with RN x2 (Not including CR): no            Is patient in home clothes:no             Chest Tubes   Pleural: yes Draining: yes               Suction: yes              Mediastinal: yes Draining: yes               Suction: yes   Dressing Change Daily:yes If No, why?                     Urinary Catheter: yes           Preventative WOC consult (need MD order): not applicable       Assessment (Nursing primary shift focus): pt has been drowsy for me all shift, but arousable. Hard to assess orientation, since he is sleepy and doesn't talk as much, but doesn't appear to be confused. He is hmong speaking, have been using language line. Pt c/o incisional pain rating 5/10, but was asleep when stating it, received scheduled tylenol. Gave PRN tramadol post-ambulation, since pt was c/o incisional pain (pt has difficulty rating pain). Clear/dim lung sounds. On RA. Shallow breather. IS up to 500 only, using flutter valve. Nonproductive coughs. Hypoactive bowel sounds, minimal flatus. No BM yet. Emesis x1, was mainly water. But denied  nausea and PRN zofran. Harrison in. Received IV lasix. Voiding adequately.  +2 edema LLE, +1RLE. SCDs applied in legs. Incision is CDI, healing well, pacer wires capped. Ast 1 w/ heart pillow and wheelchair for ambulation. Ambulated in hallway and tolerated fairly well. Saline locked all lines. Pt does have a midline, dressing was bloodied when pt transferred from ICU, dressing changed. All line flushes well. Pt has 2 CT going in 1 atrium, no air leaks, continuous suction, had 90cc out for me.     Plan (Patient Care Plan/focus): monitor rhythm, manage pain, increase IS use, increase activity, start post-op CABG education.       Jf Lopez   1/12/2019   5:42 PM

## 2021-06-23 NOTE — PROGRESS NOTES
CVTS Daily Progress Note   1/18/2019   POD#7 s/p CABGx5  Attending: Fariba   LOS: 8 days     SUBJECTIVE/INTERVAL EVENTS:  Discharge home was planned for yesterday but then patient had significant bradycardia. Discharge cancelled. Rebounded with K supplementation, but has had 2 short (<10 second) episodes of atrial fibrillation with RVR. Otherwise no issues. Maintaining oxygen saturations on room air. Normotensive. Ambulating with therapy. Pain well controlled including gout pain. +BM / flatus. Tolerating diet. Patient denies new chest pain, shortness of breath, abdominal pain, calf pain, nausea. Patient has no questions today.    OBJECTIVE:    Temp:  [97.5  F (36.4  C)-98.7  F (37.1  C)] 98.3  F (36.8  C)  Heart Rate:  [39-68] 64  Resp:  [18-20] 18  BP: ()/(60-71) 109/61  Wt Readings from Last 2 Encounters:   01/18/19 0328 141 lb 11.2 oz (64.3 kg)   01/17/19 0500 144 lb 14.4 oz (65.7 kg)   01/16/19 0451 142 lb 12.8 oz (64.8 kg)   01/15/19 0610 146 lb 4.8 oz (66.4 kg)   01/14/19 0611 135 lb 9.6 oz (61.5 kg)   01/13/19 0530 147 lb 1.6 oz (66.7 kg)   01/12/19 0556 151 lb 6.4 oz (68.7 kg)   01/11/19 0630 141 lb 9.6 oz (64.2 kg)   01/10/19 1700 143 lb (64.9 kg)   01/10/19 0955 143 lb (64.9 kg)   01/03/19 1114 146 lb (66.2 kg)       Current Medications:    Scheduled Meds:    acetaminophen  650 mg Oral Q6H    Or     acetaminophen  650 mg Rectal Q6H     ascorbic acid (vitamin C)  500 mg Oral Daily with lunch     aspirin  81 mg Oral DAILY     atorvastatin  80 mg Oral QHS     atropine  1 mg Intravenous Once     bisacodyl  10 mg Rectal Once     clopidogrel  75 mg Oral DAILY     colchicine  0.6 mg Oral BID     docusate sodium  100 mg Oral BID     ferrous sulfate  325 mg Oral BID with meals     furosemide  40 mg Intravenous BID - diuretic     heparin (PF)  5,000 Units Subcutaneous Q8H FIXED TIMES     insulin aspart (NovoLOG) injection   Subcutaneous TID with meals     insulin aspart (NovoLOG) injection   Subcutaneous  QHS     lidocaine  1 patch Transdermal DAILY     melatonin  3 mg Oral QHS     metoprolol tartrate  12.5 mg Oral BID     omeprazole  20 mg Oral QAM AC     polyethylene glycol  17 g Oral DAILY     potassium chloride ER  20 mEq Oral BID     predniSONE  20 mg Oral Daily with brkfst    Followed by     [START ON 1/21/2019] predniSONE  15 mg Oral Daily with brkfst    Followed by     [START ON 1/24/2019] predniSONE  10 mg Oral Daily with brkfst    Followed by     [START ON 1/27/2019] predniSONE  5 mg Oral Daily with brkfst     predniSONE  40 mg Oral DAILY     sodium chloride bacteriostatic  1-5 mL Intradermal Once     sodium chloride  10 mL Intravenous Q8H FIXED TIMES     Continuous Infusions:  PRN Meds:.acetaminophen, acetaminophen, albumin human, aluminum-magnesium hydroxide-simethicone, bisacodyl, bisacodyl, dextrose 50 % (D50W), glucagon (human recombinant), magnesium hydroxide, naloxone **OR** naloxone, ondansetron, sodium chloride, sodium chloride, sodium phosphates 133 mL, traMADol, traZODone    Cardiographics:    Telemetry monitoring demonstrates NSR with rates in the 60s per my personal review.    Imaging:    No results found.    Lab Results (most recent, reviewed):    Hemoglobin (g/dL)   Date Value   01/18/2019 8.9 (L)     WBC (thou/uL)   Date Value   01/18/2019 25.7 (H)     Potassium (mmol/L)   Date Value   01/18/2019 4.2     Creatinine (mg/dL)   Date Value   01/18/2019 0.74     Hemoglobin A1c (%)   Date Value   01/10/2019 5.8     Magnesium (mg/dL)   Date Value   01/16/2019 2.3     Calcium (mg/dL)   Date Value   01/18/2019 9.0       I/O:    Intake/Output Summary (Last 24 hours) at 1/18/2019 1148  Last data filed at 1/18/2019 1028  Gross per 24 hour   Intake 240 ml   Output --   Net 240 ml       Physical Exam:    General: Patient seen up in chair. NAD. Conversant. Pleasant.   CV: RRR on monitor. 2+ peripheral pulses in all extremities. Mild edema.   Pulm: Non-labored effort on room air. Incision and previous chest  tube sites C/D/I.  Abd: Soft, NT, ND  Ext: Mild pedal edema, SCDs in place, warm, distal pulses intact  Neuro: CNs grossly intact.       ASSESSMENT/PLAN:    Main Carney is a 63 y.o. male with a history of CAD who is POD#7 s/p CABGx5.    Principal Problem:    Angina, class III (H)  Active Problems:    HTN (hypertension)    Unstable angina (H)    Hyperlipidemia LDL goal <70    S/P CABG (coronary artery bypass graft)    Paroxysmal atrial fibrillation (H)    Coronary artery disease due to lipid rich plaque        NEURO:   - Scheduled Tylenol and PRN Tramadol for pain  - Melatonin QHS    CV:   - Normotensive  - Metoprolol 12.5mg two times a day   - ASA 81mg  - Atorvastatin 80mg daily  - Daily Plavix    PULM:   - Maintaining oxygen saturations on room air  - Encourage pulmonary toilet    FEN/GI:  - Continue electrolyte replacement protocol  - Diet: Cardiac, ADAT  - Bowel regimen    RENAL:  - Cr 0.74  - Diuresis with 40mg IV Lasix two times a day    HEME:  - Acute blood loss anemia post-op.   - Hgb stable, no bleeding concerns. Hep SQ, [id number]    ID:  - Lisa op ppx complete, afebrile.   - Increasing WBC likely attributed to steroids. Asymptomatic. Will monitor.    ENDO:   - SSI  - Colchicine 0.6mg daily  - Prednisone taper (gout)    PPx:   - DVT: SCDs, SQ heparin TID, ambulation   - GI: Omeprazole     DISPO:   - General telemetry floor; likely discharge home tomorrow.      Patient discussed with staff.      _______  Teresa Sebastian PA-C  Cardiothoracic Surgery  [phone]

## 2021-06-23 NOTE — PROGRESS NOTES
CVTS Daily Progress Note   1/19/2019   POD#8 s/p CABGx5  Attending: Fariba   LOS: 9 days     SUBJECTIVE/INTERVAL EVENTS:  Tachy/loren since last evening. Patient largely asymptomatic. Otherwise no issues. Maintaining oxygen saturations on room air. Normotensive. Ambulating with therapy. Pain well controlled including gout pain. +BM / flatus. Tolerating diet. WBC elevated but likely due to steroids. Patient denies new chest pain, shortness of breath, abdominal pain, calf pain, nausea. Patient has no questions today.    OBJECTIVE:    Temp:  [97.7  F (36.5  C)-98.5  F (36.9  C)] 97.8  F (36.6  C)  Heart Rate:  [60-85] 61  Resp:  [16-20] 16  BP: ()/(56-73) 136/73  Wt Readings from Last 2 Encounters:   01/19/19 0406 139 lb (63 kg)   01/18/19 0328 141 lb 11.2 oz (64.3 kg)   01/17/19 0500 144 lb 14.4 oz (65.7 kg)   01/16/19 0451 142 lb 12.8 oz (64.8 kg)   01/15/19 0610 146 lb 4.8 oz (66.4 kg)   01/14/19 0611 135 lb 9.6 oz (61.5 kg)   01/13/19 0530 147 lb 1.6 oz (66.7 kg)   01/12/19 0556 151 lb 6.4 oz (68.7 kg)   01/11/19 0630 141 lb 9.6 oz (64.2 kg)   01/10/19 1700 143 lb (64.9 kg)   01/10/19 0955 143 lb (64.9 kg)   01/03/19 1114 146 lb (66.2 kg)       Current Medications:    Scheduled Meds:    acetaminophen  650 mg Oral Q6H    Or     acetaminophen  650 mg Rectal Q6H     ascorbic acid (vitamin C)  500 mg Oral Daily with lunch     aspirin  81 mg Oral DAILY     atorvastatin  80 mg Oral QHS     atropine  1 mg Intravenous Once     bisacodyl  10 mg Rectal Once     clopidogrel  75 mg Oral DAILY     colchicine  0.6 mg Oral BID     docusate sodium  100 mg Oral BID     ferrous sulfate  325 mg Oral BID with meals     furosemide  40 mg Intravenous BID - diuretic     heparin (PF)  5,000 Units Subcutaneous Q8H FIXED TIMES     insulin aspart (NovoLOG) injection   Subcutaneous TID with meals     insulin aspart (NovoLOG) injection   Subcutaneous QHS     lidocaine  1 patch Transdermal DAILY     melatonin  3 mg Oral QHS      metoprolol tartrate  12.5 mg Oral BID     omeprazole  20 mg Oral QAM AC     polyethylene glycol  17 g Oral DAILY     potassium chloride ER  20 mEq Oral BID     predniSONE  20 mg Oral Daily with brkfst    Followed by     [START ON 1/21/2019] predniSONE  15 mg Oral Daily with brkfst    Followed by     [START ON 1/24/2019] predniSONE  10 mg Oral Daily with brkfst    Followed by     [START ON 1/27/2019] predniSONE  5 mg Oral Daily with brkfst     predniSONE  40 mg Oral DAILY     sodium chloride bacteriostatic  1-5 mL Intradermal Once     sodium chloride  10 mL Intravenous Q8H FIXED TIMES     Continuous Infusions:  PRN Meds:.acetaminophen, acetaminophen, albumin human, aluminum-magnesium hydroxide-simethicone, bisacodyl, bisacodyl, dextrose 50 % (D50W), glucagon (human recombinant), magnesium hydroxide, naloxone **OR** naloxone, ondansetron, sodium chloride, sodium chloride, sodium phosphates 133 mL, traMADol, traZODone    Cardiographics:    Telemetry monitoring demonstrates NSR with rates in the 70s per my personal review.    Imaging:    No results found.    Lab Results (most recent, reviewed):    Hemoglobin (g/dL)   Date Value   01/19/2019 9.4 (L)     WBC (thou/uL)   Date Value   01/19/2019 29.4 (H)     Potassium (mmol/L)   Date Value   01/19/2019 4.4     Creatinine (mg/dL)   Date Value   01/18/2019 0.74     Hemoglobin A1c (%)   Date Value   01/10/2019 5.8     Magnesium (mg/dL)   Date Value   01/16/2019 2.3     Calcium (mg/dL)   Date Value   01/18/2019 9.0       I/O:    Intake/Output Summary (Last 24 hours) at 1/19/2019 0958  Last data filed at 1/19/2019 0406  Gross per 24 hour   Intake 740 ml   Output --   Net 740 ml       Physical Exam:    General: Patient seen up in chair. NAD. Conversant. Pleasant.   CV: RRR on monitor. 2+ peripheral pulses in all extremities. Mild edema.   Pulm: Non-labored effort on room air. Incision and previous chest tube sites C/D/I.  Abd: Soft, NT, ND  Ext: Mild pedal edema, SCDs in place,  warm, distal pulses intact  Neuro: CNs grossly intact.       ASSESSMENT/PLAN:    Main Carney is a 63 y.o. male with a history of CAD who is POD#8 s/p CABGx5.    Principal Problem:    Angina, class III (H)  Active Problems:    HTN (hypertension)    Unstable angina (H)    Hyperlipidemia LDL goal <70    S/P CABG (coronary artery bypass graft)    Paroxysmal atrial fibrillation (H)    Coronary artery disease due to lipid rich plaque        NEURO:   - Scheduled Tylenol and PRN Tramadol for pain  - Melatonin QHS    CV:   - Normotensive  - Metoprolol 12.5mg two times a day   - ASA 81mg  - Atorvastatin 80mg daily  - Daily Plavix  - Cardiology re-consult today for tachy/loren and possible need for PPM.    PULM:   - Maintaining oxygen saturations on room air  - Encourage pulmonary toilet    FEN/GI:  - Continue electrolyte replacement protocol  - Diet: Cardiac, ADAT  - Bowel regimen    RENAL:  - Cr 0.74  - Diuresis with 40mg IV Lasix two times a day    HEME:  - Acute blood loss anemia post-op.   - Hgb stable (9.4), no bleeding concerns. Hep SQ, [id number]    ID:  - Lisa op ppx complete, afebrile.   - Increasing WBC likely attributed to steroids. Asymptomatic. Will monitor. UA pending.    ENDO:   - SSI  - Colchicine 0.6mg daily  - Prednisone taper (gout)    PPx:   - DVT: SCDs, SQ heparin TID, ambulation   - GI: Omeprazole     DISPO:   - General telemetry floor; discharge pending cardiology recs.      Patient discussed with staff.      _______  Teresa Sebastian PA-C  Cardiothoracic Surgery  [phone]

## 2021-06-23 NOTE — PROGRESS NOTES
Pt on RA sats mid 90's BS: clear/diminished. Pt instructed to use flutter valve and EzPAP, pt tolerated well. Pt achieved 800 ml on IS. Pt has strong non productive cough. Continue current tx, reassess tomorrow per [facility] protocol. RT following.     FRANK VasquezT

## 2021-06-23 NOTE — PROGRESS NOTES
Pt has had multiple bursts of afib this morning.  HR goes up to 130s-150s, then converts back to NSR in 70s-80s.  Pt has been sleeping comfortably throughout.  Discussed with B[facility], no new orders, if becomes sustained will readdress.

## 2021-06-23 NOTE — H&P
Admission History and Physical   DAVID Galeano 1955, MRN [id number]    [facility] Pr[facility]  Angina, class III (H) [I20.9]    PCP: Provider, No Primary Care, None   Code status:  Full Code       Extended Emergency Contact Information  Primary Emergency Contact: HILARIA ELIAS  Address: [address] [place] [address] [facility]  Home Phone: [phone]  Relation: Child  Secondary Emergency Contact: JAYCEE WORKMAN  Home Phone: [phone]  Relation: Daughter-In-Law       Chief Complaint: Unstable angina     Assessment and Plan:   Main Elias is a 63 y.o. male with a past medical history of HTN, unstable angina who was admitted to [facility] on the day of admission for planned cardiac catheterization, found to have severe multivessel disease.    Severe multivessel CAD  Unstable angina: History of progressive angina with dyspnea on exertion. EKG shows first degree AV block, RBBB and Q waves in the inferior leads. 1019 cardiac cath with severe multivessel disease. ECHO shows normal EF of 63% with no WMA or valvular disease. Carotid US without significant stenosis. Patient was evaluated by CV surgery and deemed a good candidate for surgery. CABG scheduled for 19 at 1100. No active CP or other concerning symptoms at this time.   - ASA  - Atorvastatin 40 mg  - Carvedilol 12.5 mg   - Lipids, Mg, Hgb A1c pending  - NPO after midnight   - CV surgery and cardiology following    HTN: Blood pressures have been borderline elevated. Pt states he has been on BP meds in the past, but does not recall the names. He is also intermittently bradycardic, likely secondary to beta blocker.   - Hydralazine 10 mg IV prn for SBP >180, DBP >100  - Pt will likely require additional meds following surgery     FEN:  Fluids:0.9NS; Diet: cardiac   PPX: Neither Mechanical nor Pharmacological VTE Prophylaxis none needed  Disposition: TBD  Status: inpatient    Patient discussed with  attending [facility] physician, Dr.James Aiken, who agrees with the plan.     Magalys Madera PGY3 1/10/2019  [facility]   Pager: [phone]    This is a [facility] Patient.  Please call the senior pager with any questions or concerns, 24/7.  [phone]    HPI:    Main Carney is a 63 y.o. old male with a past medical history of HTN, unstable angina who was admitted to [facility] on the day of admission for planned cardiac catheterization. The patient has a recent history of progressive chest pain and shortness of breath with activity. He was seen in the ED for this on 12/26/18 with benign work-up but concerning history, referral to [facility]. He saw Dr. Torsten Herzog 1/3/2019, who recommended cardiac cath. Today, angio showed severe multivessel disease. Cardiothoracic surgery was consulted, patient is scheduled for CABG tomorrow.     At the time of our conversation, Mr. Carney states that he is feeling well. Denies active CP or shortness of breath. No abdominal pain, N/V or diarrhea. He endorses history of high blood pressure, for which he was prescribed a medication at one point, but it ran out. Neither patient nor family are sure if he has hx of hyperlipidemia. He denies tobacco or drug use, no alcohol abuse. No significant family history of heart disease or stroke. Many family members including his wife, children and grandchildren are present in the room. Results are reviewed, expectations for tomorrow are discussed, and questions are answered.     History is provided by the patient with the help of his children.       Medical History  Active Ambulatory (Non-Hospital) Problems    Diagnosis     HTN (hypertension)     Past Medical History:   Diagnosis Date     Chest pain      HTN (hypertension)      Shortness of breath        Reviewed, changes/additions include none.  Surgical History  He  has a past surgical history that includes  "Appendectomy.      Reviewed, changes/additions include none.  Social History & Habits  he  reports that  has never smoked. he has never used smokeless tobacco.    Reviewed, changes/additions include he drinks occasionally, no recreational drug use.     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 Psychosocial Needs  Social History     Social History Narrative     Not on file     Additional psychosocial needs reviewed per nursing assessment.       Prior to Admission Medications   Medications Prior to Admission   Medication Sig Dispense Refill Last Dose     acetaminophen (TYLENOL) 325 MG tablet Take 650 mg by mouth every 6 (six) hours as needed for pain.   Taking     aspirin 81 mg chewable tablet Chew 1 tablet (81 mg total) daily. 30 tablet 0 Taking     atorvastatin (LIPITOR) 40 MG tablet Take 1 tablet (40 mg total) by mouth at bedtime. 30 tablet 5 1/9/2019 at Unknown time     carvedilol (COREG) 12.5 MG tablet Take 1 tablet (12.5 mg total) by mouth 2 (two) times a day with meals. 60 tablet 5 1/10/2019 at Unknown time            Review of Systems:  A 12 point comprehensive review of systems was negative except as noted. Physical Exam:   Temp:  [98  F (36.7  C)-98.3  F (36.8  C)] 98.2  F (36.8  C)  Heart Rate:  [55-62] 55  Resp:  [15-24] 18  BP: (124-163)/(64-79) 142/78  /78 (Patient Position: Sitting)   Pulse (!) 55   Temp 98.2  F (36.8  C) (Oral)   Resp 18   Ht 5' 1\" (1.549 m)   Wt 143 lb (64.9 kg)   SpO2 99%   BMI 27.02 kg/m      General Appearance:    Alert, cooperative, no distress, appears stated age   Head:    Normocephalic, without obvious abnormality, atraumatic   Eyes:    PERRL, conjunctiva/corneas clear, EOM's intact     Ears:    Normal external ear canals, both ears   Nose:   " Nares normal, no nasal discharge   Throat:   Lips, mucosa, and tongue normal; teeth and gums normal   Neck:   Supple, symmetrical   Lungs:     Clear to auscultation bilaterally, respirations unlabored   Chest wall:    No tenderness or deformity   Heart:    Regular rate and rhythm, S1 and S2 normal, no murmur, rub   or gallop   Abdomen:     Soft, non-tender, bowel sounds active all four quadrants,     no masses, no organomegaly   Extremities:   Extremities normal, atraumatic, no cyanosis or edema   Pulses:   2+ and symmetric all extremities   Skin:   Skin color, texture, turgor normal, no rashes or lesions   Lymph nodes:   Cervical, supraclavicular, and axillary nodes normal   Neurologic:   CNII-XII intact. Normal strength, sensation and reflexes       throughout        Pertinent Labs  Lab Results: personally reviewed.   Results for orders placed or performed during the hospital encounter of 01/10/19   Basic Metabolic Panel   Result Value Ref Range    Sodium 139 136 - 145 mmol/L    Potassium 4.0 3.5 - 5.0 mmol/L    Chloride 108 (H) 98 - 107 mmol/L    CO2 24 22 - 31 mmol/L    Anion Gap, Calculation 7 5 - 18 mmol/L    Glucose 103 70 - 125 mg/dL    Calcium 9.6 8.5 - 10.5 mg/dL    BUN 16 8 - 22 mg/dL    Creatinine 0.77 0.70 - 1.30 mg/dL    GFR MDRD Af Amer >60 >60 mL/min/1.73m2    GFR MDRD Non Af Amer >60 >60 mL/min/1.73m2   HM1 (CBC with Diff)   Result Value Ref Range    WBC 11.1 (H) 4.0 - 11.0 thou/uL    RBC 4.61 4.40 - 6.20 mill/uL    Hemoglobin 14.5 14.0 - 18.0 g/dL    Hematocrit 41.5 40.0 - 54.0 %    MCV 90 80 - 100 fL    MCH 31.5 27.0 - 34.0 pg    MCHC 34.9 32.0 - 36.0 g/dL    RDW 12.3 11.0 - 14.5 %    Platelets 368 140 - 440 thou/uL    MPV 9.1 8.5 - 12.5 fL    Neutrophils % 66 50 - 70 %    Lymphocytes % 22 20 - 40 %    Monocytes % 9 2 - 10 %    Eosinophils % 2 0 - 6 %    Basophils % 1 0 - 2 %    Neutrophils Absolute 7.3 2.0 - 7.7 thou/uL    Lymphocytes Absolute 2.5 0.8 - 4.4 thou/uL    Monocytes Absolute 1.0 (H)  0.0 - 0.9 thou/uL    Eosinophils Absolute 0.2 0.0 - 0.4 thou/uL    Basophils Absolute 0.1 0.0 - 0.2 thou/uL   Magnesium   Result Value Ref Range    Magnesium 2.4 1.8 - 2.6 mg/dL   Prealbumin   Result Value Ref Range    Prealbumin 20.5 19.0 - 38.0 mg/dL   Urinalysis-UC if Indicated   Result Value Ref Range    Color, UA Yellow Colorless, Yellow, Straw, Light Yellow    Clarity, UA Clear Clear    Glucose, UA Negative Negative    Bilirubin, UA Negative Negative    Ketones, UA Trace (!) Negative    Specific Gravity, UA >1.050 (H) 1.001 - 1.030    Blood, UA Negative Negative    pH, UA 6.0 4.5 - 8.0    Protein, UA 50 mg/dL (!) Negative mg/dL    Urobilinogen, UA <2.0 E.U./dL <2.0 E.U./dL, 2.0 E.U./dL    Nitrite, UA Negative Negative    Leukocytes, UA Negative Negative    Bacteria, UA None Seen None Seen hpf    RBC, UA 0-2 None Seen, 0-2 hpf    WBC, UA 0-5 None Seen, 0-5 hpf    Squam Epithel, UA 0-5 None Seen, 0-5 lpf    Mucus, UA Few (!) None Seen lpf   INR   Result Value Ref Range    INR 1.08 0.90 - 1.10   Electrocardiogram, 12-lead   Result Value Ref Range    SYSTOLIC BLOOD PRESSURE  mmHg    DIASTOLIC BLOOD PRESSURE  mmHg    VENTRICULAR RATE 56 BPM    ATRIAL RATE 110 BPM    P-R INTERVAL 210 ms    QRS DURATION 152 ms    Q-T INTERVAL 464 ms    QTC CALCULATION (BEZET) 447 ms    P Axis 43 degrees    R AXIS 29 degrees    T AXIS 2 degrees    MUSE DIAGNOSIS       Sinus tachycardia with 2nd degree A-V block with 2:1 A-V conduction  Right bundle branch block  Left ventricular hypertrophy with QRS widening  Inferior infarct (cited on or before 26-DEC-2018)  Abnormal ECG  When compared with ECG of 26-DEC-2018 14:35,  Sinus rhythm is now with 2nd degree A-V block  T wave inversion now evident in Anterior leads  Confirmed by ILDA NEWMAN MD LOC:SJ ([id number]) on 1/10/2019 3:48:04 PM     Echo Complete   Result Value Ref Range    BSA 1.67 m2    Hieght 61 in    Weight 2,288 lbs    HR 56 bpm    LV volume diastolic 64 62 - 150 cm3    LV  volume systolic 24 21 - 61 cm3    IVSd 0.9 0.6 - 1.0 cm    LVIDd 4.8 4.2 - 5.8 cm    LVIDs 3.5 2.5 - 4.0 cm    LVOT diam 2.2 cm    LVOT mean gradient 3 mmHg    LVOT peak VTI 24.5 cm    LVOT mean nasir 77.4 cm/s    LVOT peak nasir 110 cm/s    LVOT peak gradient 5 mmHg    LV PWd 1.2 (!) 0.6 - 1.0 cm    MV E' lat nasir 8.97 cm/s    MV E' med nasir 5.75 cm/s    AV mean nasir 102 cm/s    AV mean gradient 5 mmHg    AV VTI 32.6 cm    AV peak nasir 150 cm/s    AO root 3.4 cm    AO ascending 3.8 cm    LA size 3.7 cm    MR PISA VN Nyq 30.8 cm/s    MR PISA radius 0.2 cm    MR mean velocity 359 cm/s    MR mean gradient 57 mmHg    MR  cm    MR PISA peak nasir 461 cm/s    MV decel time 278 ms    MV peak A nasir 75 cm/s    MV peak E nasir 69.6 cm/s    MR flow 4 cm3    TN peak nasir 87.5 cm/s    TN peak gradient 3 mmHg    TR peak nasir 265 cm/s    LA area 2 22.4 cm2    LA area 1 22 cm2    LA length 6.23 cm    IVS/PW ratio 0.8     MR peak gradent 85.0 mmHg    TR peak gradent 28.1 mmHg    LV FS 27.1 28 - 44 %    Echo LVEF calculated 63 55 - 75 %    LA volume 67.2 mL    LV mass 181.9 g    AV area 2.9 cm2    AV DIM IND nasir 0.7     MV E/A Ratio 0.9     LVOT area 3.80 cm2    LVOT SV 93.1 cm3    AV peak gradient 9.0 mmHg    MR PISA EROA 0.0 cm2    MR volume 3.2 cc    LV systolic volume index 14.4 11 - 31 cm3/m2    LV diastolic volume index 38.3 34 - 74 cm3/m2    LA volume index 40.3 mL/m2    LV mass index 108.9 g/m2    LV SVi 55.7 ml/m2    MV med E/e' ratio 12.1     MV lat E/e' ratio 7.8     LV CO 5.2 l/min    LV Ci 3.1 l/min/m2    Height 61.0 in    Weight 143 lbs    MV Avg E/e' Ratio 9.5 cm/s    AV DIM IND VTI 0.8         Pertinent Radiology:  Radiology Results: personally reviewed.    1/10/19 Cardiac Catheterization  Conclusion       Unstable angina. Obese man with hypertension and hyperlipidemia.    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    Severe multivessel disease.    LV EDP normal     1/10/19  ECHO    Summary       Left ventricle ejection fraction is normal. The calculated left ventricular ejection fraction is 63%.    Normal right ventricular size and systolic function.    No hemodynamically significant valvular heart abnormalities.    No previous study for comparison.       Xr Chest 2 Views    Result Date: 1/10/2019  XR CHEST 2 VIEWS 1/10/2019 4:34 PM INDICATION: Pre op cabg COMPARISON: Chest x-ray 12/26/2018 FINDINGS: No pneumothorax. Mild pulmonary hyperinflation. The lungs are clear and there are no pleural effusions. Stable upper normal heart size. No overt vascular congestion.    Xr Chest 2 Views    Result Date: 12/26/2018  XR CHEST 2 VIEWS 12/26/2018 2:45 PM INDICATION: Sob, harmon COMPARISON: None. FINDINGS: Trace right pleural effusion or pleural scar. Heart size appears normal. Lungs appear clear.    Us Carotid Bilateral    Result Date: 1/10/2019  US CAROTID BILATERAL 1/10/2019 4:31 PM INDICATION: Pre op evaluation. TECHNIQUE: Duplex exam performed utilizing 2D gray-scale imaging, Doppler interrogation with color-flow and spectral waveform analysis. COMPARISON: None. FINDINGS: RIGHT: There is a moderate amount of atheromatous plaque. Normal waveforms with no significant stenosis. LEFT: There is a moderate amount of atheromatous plaque. Normal waveforms with no significant stenosis. Both vertebral arteries and subclavian artery waveforms are normal. VELOCITY CHART: The following velocities were obtained in the RIGHT carotid system. CCA: 71/12 cm/s ICA: 79/25 cm/s ECA: 106/7 cm/s ICA/CCA: PS 1.11 The following velocities were obtained in the LEFT carotid system. CCA: 75/26 cm/s ICA: 87/31 cm/s ECA: 96/16 cm/s ICA/CCA: PS 1.16                            CONCLUSION: 1.  Mild atheromatous plaque in the carotid arteries. 2.  No significant stenosis on either side. Evaluation based on velocities and NASCET criteria.      Cardiographics:   EKG Results: personally reviewed.   EKG: sinus bradycardia, RBBB, Q  waves in II, III and aVF, 1st degree AV block.    This note was created with help of Dragon dictation system. Grammatical /typing errors are not intentional.    Magalys Madera   1/10/2019      1/11/2019  [facility] Faculty Attestation  I have seen and examined the patient.  I have discussed the case with the resident physician(s), Dr. Madera  I agree with the findings, assessment and plan.    Aakash Perez MD

## 2021-06-23 NOTE — ANESTHESIA PREPROCEDURE EVALUATION
Anesthesia Evaluation      Patient summary reviewed   No history of anesthetic complications     Airway   Mallampati: III  Neck ROM: full  Comment: Feasible intubation   Pulmonary - normal exam    breath sounds clear to auscultation  (+) shortness of breath,   (-) sleep apnea, not a smoker    ROS comment:    FINDINGS: No pneumothorax. Mild pulmonary hyperinflation. The lungs are clear and there are no pleural effusions. Stable upper normal heart size. No overt vascular congestion.                         Cardiovascular   (+) hypertension, CAD, dysrhythmias, angina, , hypercholesterolemia,     (-) murmur  ECG reviewed  Rhythm: regular  Rate: normal,    no murmur   ROS comment: Anomalous. Take off is very anterior, near the junction of the right and left cusps  Mid LM to Dist LM lesion is 40% stenosed.  Left Anterior Descending  Prox LAD-1 lesion is 50% stenosed.  Prox LAD-2 lesion is 70% stenosed.  Mid LAD lesion is 80% stenosed.  Dist LAD lesion is 70% stenosed.  Second Diagonal Branch  Ost 2nd Diag lesion is 70% stenosed.  Third Diagonal Branch  Ost 3rd Diag lesion is 50% stenosed.  Left Circumflex  Mid Cx to Dist Cx lesion is 70% stenosed with 70% stenosed side branch in Ost 3rd Mrg to 3rd Mrg.  Second Obtuse Marginal Branch  Ost 2nd Mrg lesion is 50% stenosed. Very small artery.  Right Coronary Artery  Ost RCA lesion is 80% stenosed.  Prox RCA lesion is 90% stenosed.  Mid RCA lesion is 80% stenosed.  Right Posterior Descending Artery  Ost RPDA lesion is 50% stenosed.  RPDA lesion is 90% stenosed.  Intervention     No interventions have been documented.   RBBB     Neuro/Psych    (+) seizures well controlled,     Comments: IMPRESSION:   CONCLUSION:  1.  Mild atheromatous plaque in the carotid arteries.  2.  No significant stenosis on either side.    Seizures as a young adult. None since.    Endo/Other    (+) obesity,   (-) no diabetes     GI/Hepatic/Renal    (-) GERD, esophageal disease     Other findings:   Left  ventricle ejection fraction is normal. The calculated left ventricular ejection fraction is 63%.    Normal right ventricular size and systolic function.    No hemodynamically significant valvular heart abnormalities.    No previous study for comparison.    Some missing teeth o/w good repair      Dental - normal exam                        Anesthesia Plan  Planned anesthetic: general endotracheal  Methadone  Glidescope in room  ASA 3   Induction: intravenous   Anesthetic plan and risks discussed with: patient, spouse, child/children and  services used  Anesthesia plan special considerations: increased risk of difficult airway, antiemetics, CVP line, arterial catheterization, pulmonary artery catheterization, IV therapy two IVs, dexmedetomidine  Post-op plan: other and extended intubation/vent support (ICU)

## 2021-06-23 NOTE — ANESTHESIA PROCEDURE NOTES
Central line    Start time: 1/11/2019 12:05 PM  End time: 1/11/2019 12:20 PM  Patient location: OR Post-induction  Indications: central pressure monitoring and vascular access  Performing Anesthesiologist: Carmine Lema MD  Pre-procedure Checklist  Completed: patient identified, site marked, risks, benefits, and alternatives discussed, timeout performed, consent obtained, hand hygiene performed, all elements of maximal sterile barriers used including cap, mask, gown, sterile gloves, and large sheet and skin prep agent completely dried prior to procedure    Procedure Details:  Lidocaine 1% local anesthesia used for skin prep  Location details: right internal jugular  Site selection rationale: access  Catheter type: Introducer with [place]  Introducer type: MAC  Lumens:double lumenWedged at: 58   Withdrawn and locked at: 52Number of attempts: 1  Ultrasound evaluation of access site: yes  Vessel patent by US exam  Concurrent real time visualization of needle entry  manometry confirmation of venous access    Post-procedure:   line sutured  Assessment: blood return through all ports and free fluid flow  Complications: none

## 2021-06-23 NOTE — PROGRESS NOTES
"  Assessment:       1. S/P CABG x 5, with LIMA-LAD, rSVG-Diag2, rSVG-OM2, dOLC-Nxmy-WRC-Mid-PDA, EVH from E on 01/11/2019          Plan:   S/P CABG x 5, with LIMA-LAD, rSVG-Diag2, rSVG-OM2, vEEY-Zbbb-CPM-Mid-PDA, EVH from E on 01/11/2019  Discharge to home on 01/19/2019 and presents today with his son for a postop CV surgery follow up  Denies chest pain and or shortness of breath but has occasional sternal incisional sensitivity, incisions are healing well, lung sounds clear and no sternal instability is appreciated  Appetite is good, bowel movement is regular and no voiding difficulties reported  Patient saw his PCP Chetna Fry MD on within a week of hospital discharged  Started outpatient cardiac rehab on 01/23/2019 and is tolerating well  May start driving by 02/11/2019  Patient is a  for charted school and may return to work on 04/11/2019  Otherwise doing well and will not need any further CV surgery follow up, but advised to call with questions or concerns    /70 (Patient Site: Left Arm, Patient Position: Sitting, Cuff Size: Adult Regular)   Pulse 72   Temp 97.8  F (36.6  C) (Oral)   Resp 16   Ht 5' 1\" (1.549 m)   Wt 135 lb (61.2 kg)   BMI 25.51 kg/m      Current Outpatient Medications   Medication Sig     aspirin 81 mg chewable tablet Chew 1 tablet (81 mg total) daily.     atorvastatin (LIPITOR) 80 MG tablet Take 1 tablet (80 mg total) by mouth at bedtime.     clopidogrel (PLAVIX) 75 mg tablet Take 1 tablet (75 mg total) by mouth daily.     colchicine 0.6 mg tablet Take 1 tablet (0.6 mg total) by mouth 2 (two) times a day.     ferrous sulfate 325 (65 FE) MG tablet Take 1 tablet (325 mg total) by mouth 2 (two) times a day with meals.     metoprolol tartrate (LOPRESSOR) 25 MG tablet Take 0.5 tablets (12.5 mg total) by mouth 2 (two) times a day.     acetaminophen (TYLENOL) 325 MG tablet Take 650 mg by mouth every 6 (six) hours as needed for pain.     docusate sodium (COLACE) 100 " MG capsule Take 1 capsule (100 mg total) by mouth 2 (two) times a day as needed for constipation.     furosemide (LASIX) 20 MG tablet Take 1 tablet (20 mg total) by mouth 2 (two) times a day at 9am and 6pm. For 7 days     lidocaine 4 % patch Place 1 patch on the skin daily. Remove and discard patch with 12 hours or as directed by MD.     traMADol (ULTRAM) 50 mg tablet Take 1 tablet (50 mg total) by mouth every 8 (eight) hours as needed for pain.             Subjective:        Patient ID: Main Carney is a 63 y.o. male.    Chief Complaint:  HPI  The following portions of the patient's history were reviewed and updated as appropriate: allergies, current medications, past family history, past medical history, past social history, past surgical history and problem list.  Mr Carney is a 63 years old male who underwent a CABG x 5, with LIMA-LAD, rSVG-Diag2, rSVG-OM2, rSHL-Wzzq-TUJ-Mid-PDA, EVH from LLE on 01/11/2019 at [facility] by Dr Jazmín Otto MD.  He was discharge to home on 01/19/2019 and presents today with his son for a postop CV surgery follow up.  He denies chest pain and or shortness of breath but has occasional sternal incisional sensitivity, incisions are healing well, lung sounds clear and no sternal instability is appreciated.  He stated that his appetite is good, bowel movement is regular and no voiding difficulties reported.  Patient saw his PCP Chetna Fry MD on within a week of hospital discharged.  Started outpatient cardiac rehab on 01/23/2019 and is tolerating well  May start driving by 02/11/2019.  Patient is a  for charted school and may return to work on 04/11/2019.  Otherwise doing well and will not need any further CV surgery follow up, but advised to call with questions or concerns      Review of Systems   Constitution: Negative.   HENT: Negative.    Eyes: Negative.    Cardiovascular: Negative.    Respiratory: Negative.    Hematologic/Lymphatic: Negative.    Skin:  Negative.    Musculoskeletal: Negative.    Gastrointestinal: Negative.    Genitourinary: Negative.    Neurological: Negative.    Psychiatric/Behavioral: Negative.           Objective:     Physical Exam   Constitutional: He appears healthy. No distress.   HENT:   Nose: Nose normal.   Mouth/Throat: Dentition is normal. Oropharynx is clear.   Eyes: Conjunctivae are normal. Pupils are equal, round, and reactive to light.   Neck: Normal range of motion and thyroid normal. Neck supple.   Pulmonary/Chest: Effort normal and breath sounds normal. He has no wheezes. He has no rales. He exhibits no tenderness.   Abdominal: Soft. Bowel sounds are normal.   Musculoskeletal: Normal range of motion.   Neurological: He is alert and oriented to person, place, and time. He has normal motor skills, normal reflexes and intact cranial nerves. Gait normal.   Skin: Skin is warm and dry.

## 2021-06-23 NOTE — PROGRESS NOTES
[facility] Daily Progress Note    Assessment & Plan: Main Carney is a 63 y.o. male with a past medical history of HTN, unstable angina admitted for severe multivessal coronary artery disease POD#2 s/p CABGx5.     Principal Problem:    Angina, class III (H)  Active Problems:    Unstable angina (H)    Hyperlipidemia LDL goal <70    S/P CABG (coronary artery bypass graft)    On mechanically assisted ventilation (H)    Severe multivessel CAD  Unstable angina  POD#2 s/p CABG x 5   History of progressive angina with dyspnea on exertion. EKG shows first degree AV block, RBBB and Q waves in the inferior leads. 1/1019 cardiac cath with severe multivessel disease. ECHO shows normal EF of 63% with no WMA or valvular disease. Carotid US without significant stenosis. Now postop day 2 from CABG.  Patient was extubated successfully 1/11 without any issue.  Patient off of all blood pressure support at this time.  ICU has signed off.  CV surgeon okay to stepdown from ICU to cardiac telemetry. Doing well otherwise.   - ASA 81mg daily  - Atorvastatin 40 mg  - Carvedilol 12.5 mg two times a day  - Lasix 40mg IV three times a day per CV surgery  - SSI per CV surgery  - CV surgery and cardiology following     HTN: Blood pressures have been borderline elevated. Pt states he has been on BP meds in the past, but does not recall the names. He is also intermittently bradycardic, likely secondary to beta blocker.   - Carvedilol as above.    Anemia  Patient came in with hgb of 14 on admission, now low to 8.4, has been stable since surgery however. Presume this is related to blood loss in surgery and chest tubes output. WIll need continued monitoring of this.  - Goal Hgb >8 as it is a cardiac pt in setting of recent surgery    Diet: Cardiac diet following   Fluids: No fluids   VTE Prophylaxis: None at this time    Discharge Planning discussed with patient  Barriers to discharge: pending CABG   Anticipated discharge day:  1/14/19  Disposition:  SNF  Status: inpatient .admit  Length of Stay: 3     Patient discussed with attending [facility] physician, Dr.Manuel Perez, who agrees with the plan.     Soren Ahuja PGY2 1/13/2019  [facility]   Pager: [phone] (from 8AM-5:30PM)    This is a [facility] Patient.  Please call the senior pager with any questions or concerns, 24/7.  [phone]    Subjective/Interval events:  Patient is currently doing well. Denies any chest pain or shortness of breath. Main concern at this time is incisional pain. Wife at bedside and supportive. He is happy that the surgery went well.  Patient stepped down from ICU on 1/12 without any issues after he was extubated and weaned of pressors.     ROS: Negative unless noted above.    Objective  Vital signs in last 24 hours  Temp:  [98  F (36.7  C)-99.5  F (37.5  C)] 98  F (36.7  C)  Heart Rate:  [63-73] 73  Resp:  [16-26] 17  BP: (110-124)/(57-75) 118/57  147 lb 1.6 oz (66.7 kg)  Physical Exam  General appearance: alert, appears stated age, cooperative and no distress  Head: Normocephalic, without obvious abnormality, atraumatic  Eyes: conjunctivae/corneas clear.  EOM's intact.  Lungs: clear to auscultation bilaterally  Heart: regular rate and rhythm, S1, S2 normal, no murmur, click, rub or gallop   Chest: Well-healing sternotomy without any drainage or spreading erythema.  Chest tubes discontinued incisions well healing.  Abdomen: soft, non-tender; bowel sounds normal; no masses,  no organomegaly  Extremities: Left lower extremity bandaged from vein harvest site, nontender to palpation.  No lower extremity edema noted.   Pulses: 2+ and symmetric  Skin: Skin color, texture, turgor normal. No rashes or lesions    Intake/Output last 3 shifts  I/O last 3 completed shifts:  In: 1124 [P.O.:600; I.V.:524]  Out: 3600 [Urine:3370; Chest Tube:230]  Pertinent Labs   Results from last 7 days   Lab Units 01/13/19  3824  01/12/19  0340 01/11/19  1733 01/11/19  0922   LN-WHITE BLOOD CELL COUNT thou/uL  --  11.0 15.7* 13.5*   LN-HEMOGLOBIN g/dL 8.4* 8.1* 10.2* 14.0   LN-HEMATOCRIT %  --  24.6* 29.7* 40.2   LN-PLATELET COUNT thou/uL 176 156 189 372     Results from last 7 days   Lab Units 01/13/19  0525 01/12/19  0340 01/12/19  0127  01/11/19  1733   LN-SODIUM mmol/L 140 144  --   --  141   LN-POTASSIUM mmol/L 3.9 4.1 4.5   < > 4.2   LN-CHLORIDE mmol/L 106 115*  --   --  115*   LN-CO2 mmol/L 24 22  --   --  22   LN-BLOOD UREA NITROGEN mg/dL 21 14  --   --  12   LN-CREATININE mg/dL 0.77 0.76  --   --  0.65*   LN-CALCIUM mg/dL 8.7 8.1*  --   --  7.5*    < > = values in this interval not displayed.     Results from last 7 days   Lab Units 01/12/19  0339 01/11/19  1733 01/11/19  1620   LN-INR  1.35* 1.48* 1.54*   LN-PARTIAL THROMBOPLASTIN TIME seconds  --   --  71*     Pertinent Radiology    Xr Chest 2 Views    Result Date: 1/10/2019  XR CHEST 2 VIEWS 1/10/2019 4:34 PM INDICATION: Pre op cabg COMPARISON: Chest x-ray 12/26/2018 FINDINGS: No pneumothorax. Mild pulmonary hyperinflation. The lungs are clear and there are no pleural effusions. Stable upper normal heart size. No overt vascular congestion.    Xr Chest 2 Views    Result Date: 12/26/2018  XR CHEST 2 VIEWS 12/26/2018 2:45 PM INDICATION: Sob, harmon COMPARISON: None. FINDINGS: Trace right pleural effusion or pleural scar. Heart size appears normal. Lungs appear clear.    Us Carotid Bilateral    Result Date: 1/10/2019  US CAROTID BILATERAL 1/10/2019 4:31 PM INDICATION: Pre op evaluation. TECHNIQUE: Duplex exam performed utilizing 2D gray-scale imaging, Doppler interrogation with color-flow and spectral waveform analysis. COMPARISON: None. FINDINGS: RIGHT: There is a moderate amount of atheromatous plaque. Normal waveforms with no significant stenosis. LEFT: There is a moderate amount of atheromatous plaque. Normal waveforms with no significant stenosis. Both vertebral arteries and  subclavian artery waveforms are normal. VELOCITY CHART: The following velocities were obtained in the RIGHT carotid system. CCA: 71/12 cm/s ICA: 79/25 cm/s ECA: 106/7 cm/s ICA/CCA: PS 1.11 The following velocities were obtained in the LEFT carotid system. CCA: 75/26 cm/s ICA: 87/31 cm/s ECA: 96/16 cm/s ICA/CCA: PS 1.16                            CONCLUSION: 1.  Mild atheromatous plaque in the carotid arteries. 2.  No significant stenosis on either side. Evaluation based on velocities and NASCET criteria.    Current Medications.     acetaminophen  650 mg Oral Q6H    Or     acetaminophen  650 mg Rectal Q6H     ascorbic acid (vitamin C)  500 mg Oral Daily with lunch     aspirin  81 mg Oral DAILY     atorvastatin  80 mg Oral QHS     [START ON 1/14/2019] bisacodyl  10 mg Rectal Once     chlorhexidine  15 mL Topical Q12H 09-21     docusate sodium  100 mg Oral BID     ferrous sulfate  325 mg Oral BID with meals     furosemide  40 mg Intravenous TID     heparin (PF)  5,000 Units Subcutaneous Q8H FIXED TIMES     insulin aspart (NovoLOG) injection   Subcutaneous TID with meals     insulin aspart (NovoLOG) injection   Subcutaneous QHS     lidocaine  1 patch Transdermal DAILY     magnesium hydroxide  30 mL Oral DAILY     melatonin  3 mg Oral QHS     metoprolol tartrate  12.5 mg Oral BID     omeprazole  20 mg Oral QAM AC     polyethylene glycol  17 g Oral DAILY     potassium chloride ER  20 mEq Oral BID     sodium chloride bacteriostatic  1-5 mL Intradermal Once     sodium chloride  10 mL Intravenous Q8H FIXED TIMES     PRN:acetaminophen, acetaminophen, albumin human, aluminum-magnesium hydroxide-simethicone, bisacodyl, [START ON 1/14/2019] bisacodyl, dextrose 50 % (D50W), glucagon (human recombinant), magnesium hydroxide, naloxone **OR** naloxone, ondansetron, sodium chloride, sodium chloride, sodium phosphates 133 mL, traMADol, traZODone    Soren Ahuja MD    1/14/2019  [facility] Faculty Attestation  I have seen and examined the  patient.1/13/19  I have discussed the case with the resident physician(s), Dr. Alfredo  I agree with the findings, assessment and plan.    Aakash Perez MD

## 2021-06-23 NOTE — PROGRESS NOTES
Cardiac Rehab  Phase II Assessment    Assessment Date: 1/23/2019    Diagnosis: CAD Date of Onset: 1/10/2019  Procedure: CABG x 5 Date of Onset: 1/11/2019  ICD/Pacemaker: No Parameters:NA  Post-op Complications: Gout  ECG History: Paroxysmal A-fib, Tachy-Evan syndrome EF%:63  Past Medical History:   Patient Active Problem List   Diagnosis     Angina, class III (H)     HTN (hypertension)     Unstable angina (H)     Hyperlipidemia LDL goal <70     S/P CABG (coronary artery bypass graft)     Paroxysmal atrial fibrillation (H)     Coronary artery disease due to lipid rich plaque     SSS (sick sinus syndrome) (H)     Past Medical History:   Diagnosis Date     Chest pain     2018     HTN (hypertension)      Shortness of breath        Physical Assessment  Precautions/ Physical Limitations: Sternal/surgical,   Oxygen: No  O2 Sats: 100% Lung Sounds: clear Edema: none  Incisions: healing well  Sleeping Pattern: fair   Appetite: fair   Nutrition Risk Screen: Wound Healing    Pain  Location: left chest   Characteristics:throbbing pain with coughing and deep breathing  Intensity: (0-10 scale) 6  Current Pain Management: Tramadol, Lidocaine patch.   Intervention:   Response: some relief    Psychosocial/ Emotional Health  1. In the past 12 months, have you been in a relationship where you have been abused physically, emotionally, sexually or financially? No  notified: NA  2. Who do you turn to for emotional support?: My kids  3. Do you have cultural or spiritual needs? Yes . .  4. Have there been any major life changes in the past 12 months? Yes . Heart surgery.    Referral Information  Primary Physician: [facility]  Cardiologist: Dr. Torsten Herzog  Surgeon: Jazmín Otto    Home exercise/Equipment:     Patient's long-term goal(s): Resume more activity like cooking and caring for grandchildren.    1. Living Accommodations: Home Steps: Yes      Support people at home: son and daughter-in-law , wife  2.  Marital Status:   3. Family is able to assist with cares      Restorationist/Community involvement: yes  4. Recreation/Hobbies: [email or web address]

## 2021-06-23 NOTE — PROGRESS NOTES
Pt HR has been going from NSR with rates in 70s-80s, to afib bursts in 130s, to sinus loren w/ a 2nd degree type 2 block with hr in the mid 30s.  12 lead ekg obtained, cv surgery paged.  Pt states that he feels fine, BP is 105/63.

## 2021-06-23 NOTE — PROGRESS NOTES
Patient Name: Main Carney   MRN: [id number]   Date of Admission: 1/10/2019    Procedure: CORONARY ARTERY BYPASS x 5, LEFT ENDOSCOPIC VEIN HARVEST, INTERNAL MAMMARY ARTERY, EPIAORTIC ULTRASOUND, ANESTHESIA TRANSESOPHAGEAL ECHOCARDIOGRAM    Post Op day #:5    Subjective (Patient focus/Primary Problem for shift): The patient foot pain will be controlled well enough to participate in cardiac rehab.           Pain Goal0 Pain Rating 5           Pain Medication/ Regime effective to reduce patient pain Prednisone has helped a lot with gout pain.  This along with PRN and scheduled Tylenol.    Objective (Physical assessment):           Rhythm: normal sinus rhythm            Bowel Activity: yes if Yes indicate when: Patient reported today          Bowel Medications: yes            Incision: healing well          Incentive Spirometry Q 1-2 hour when awake:  yes Volume: 750            Epicardial Pacing Wires:  no            Patient Activity:           Up to chair for meals: yes          Ambulation with RN x2 (Not including CR): no            Is patient in home clothes:no             Chest Tubes   Pleural: no Draining: no               Suction: no              Mediastinal: no Draining: no               Suction: no   Dressing Change Daily:no If No, why?DAMIÁN                     Urinary Catheter: no           Preventative WOC consult (need MD order): no       Assessment (Nursing primary shift focus): The patients pain is better controlled and he is starting to work with cardiac rehab.  The patient needs to continue to work on using his IS.    Plan (Patient Care Plan/focus): Continue to work on IS and flutter valve.  The patients pain will be controlled well enough that he is able to participate in cardiac rehab.      Tristin Wagner   1/16/2019   4:25 PM

## 2021-06-23 NOTE — PLAN OF CARE
POD 6      Patient/Family/Caregiver demonstrates understanding of discharge plan of care Progressing      Discharge pain/discomfort management Progressing      Post discharge mobility/activity Progressing      Patients nutritional intake is adequate upon discharge Progressing      Patient will understand discharge meds Progressing          The patients was up and walking with his wife this morning.  He stated that his foot felt much better and he was able to walk with little pain.  He had no C/O CP,SOB, nausea or dizziness.

## 2021-06-23 NOTE — PLAN OF CARE
Day of Surgery & POD1      Patient/Family/Caregiver demonstrates understanding of restraint use/application and pain managment Progressing      Mobility/activity is initiated Progressing      Patient will maintain patent airway Progressing      Maintain hemodynamics Progressing        Epi off at 0220.  Art line positional, dampened waveform and difficult to flush. Cuff pressure cycling q15 minutes.  Art line and swan discontinued in AM.  Cordis left for additional IV access.  Insulin gtt stopped and continued to check BG frequently in 130's.  Tolerating clear liquid diet.  Pt up to chair in AM with assist of 2, tolerated well.  Harrison output 320cc   and chest tube output 130cc for shift.

## 2021-06-23 NOTE — PLAN OF CARE
POD 5      Patient/Family/Caregiver demonstrates understanding of discharge plan of care Progressing      Discharge pain/discomfort management Progressing      Post discharge mobility/activity Progressing      Patients nutritional intake is adequate upon discharge Progressing      Patient will understand discharge meds Progressing          The patient stated that his leg pain has improved and he is feeling much better today.  Was able to walk a small amount today and ride the bike in cardiac rehab.

## 2021-06-23 NOTE — PROGRESS NOTES
Pt was extubated at 2050 w/ no sign of stridor and respiratory complications. Pt was placed on 3L NC and maintain a Sat's of 100%, will titrate O2 as needed and will drawn post extubation ABG after 30 min. RT will continue to monitor and follow.     FRANK EncinasT

## 2021-06-23 NOTE — PLAN OF CARE
Problem: Discharge Barriers  Goal: Patient's discharge needs are met  Outcome: Progressing  Care Plan  Care Management      Care Management Goals of the Day: Progression of care    Care Progression Reviewed With: Charge RN, MD, HUC, RNCM    Barriers to Discharge:PODx3, CABGx5    Discharge Disposition:home    Expected Discharge Date:1/15/2019    Transportation:family      Care Coordination Narrative: Pt is independent from home, no needs at DC, family to transport at DC, CM to follow.

## 2021-06-23 NOTE — PLAN OF CARE
POD 4      Patient/Family/Caregiver demonstrates understanding of discharge plan of care Progressing      Discharge pain/discomfort management Progressing      Post discharge mobility/activity Progressing      Patients nutritional intake is adequate upon discharge Progressing      Patient will understand discharge meds Progressing          The patient has been having horrible right knee and ankle pain.  This has been making it hard for him to participate in PT.  The patient was started on Prednisone so he should start to get better pain control and be able to walk more and participate in Therapies.

## 2021-06-23 NOTE — PLAN OF CARE
Patient Name: Main Carney   MRN: [id number]   Date of Admission: 1/10/2019    Procedure: CORONARY ARTERY BYPASS x 5, LEFT ENDOSCOPIC VEIN HARVEST, INTERNAL MAMMARY ARTERY, EPIAORTIC ULTRASOUND, ANESTHESIA TRANSESOPHAGEAL ECHOCARDIOGRAM    Post Op day #:5    Subjective (Patient focus/Primary Problem for shift): Pt reports discomfort with movement on right lower extremity d/t gout flare up          Pain Goalno pain Pain Rating5/10- incisional pain          Pain Medication/ Regime effective to reduce patient pain: Scheduled tylenol    Objective (Physical assessment):           Rhythm: normal sinus rhythm with frequent PACs            Bowel Activity: yes if Yes indicate when: 1/15/19 per pt          Bowel Medications: yes            Incision: healing well          Incentive Spirometry Q 1-2 hour when awake:  yes Volume: 750            Epicardial Pacing Wires:  not applicable            Patient Activity:           Up to chair for meals: yes          Ambulation with RN x2 (Not including CR): no            Is patient in home clothes:no             Chest Tubes   Pleural: not applicable Draining: not applicable               Suction: not applicable              Mediastinal: not applicable Draining: not applicable               Suction: not applicable   Dressing Change Daily:no If No, why?DAMIÁN                     Urinary Catheter: no           Preventative WOC consult (need MD order): no       Assessment (Nursing primary shift focus): Pt is AxO, uses the call light appropriately and can verbalize needs     Plan (Patient Care Plan/focus): Encourage the use of IS and flutter valve      Amarilis Reyes   1/16/2019   5:04 AM

## 2021-06-23 NOTE — PROGRESS NOTES
Went to pt room to give respiratory treatments x3, pt out of room every time so treatments not done. Will continue to follow.    FRANK SanchezT

## 2021-06-23 NOTE — PROGRESS NOTES
Pt denied pain. On RA. Angio site is c/d/i. Evening CAB checklist was begun. Pt alert and oriented x4. Family in the room.

## 2021-06-23 NOTE — PROGRESS NOTES
[facility] Daily Progress Note    Assessment & Plan: Main Carney is a 63 y.o. male with a past medical history of HTN, unstable angina admitted for severe multivessal coronary artery disease and now s/p CABG x 5 via left endoscopic vein harvest. Continues to be intubated and sedated.   - Appreciate cards recs  - Cares per ICU at this time  - Medicine team to follow.     Subjective/Interval events:   Patient sedated and still intubated in the ICU. No complications reported,  ml.      Objective  Vital signs in last 24 hours  Temp:  [97.1  F (36.2  C)-99.1  F (37.3  C)] 97.1  F (36.2  C)  Heart Rate:  [58-90] 89  Resp:  [16-18] 16  BP: (134-167)/(65-85) 141/85  Arterial Line BP: (104-105)/(71-73) 105/73  FiO2 (%):  [80 %-100 %] 80 %  141 lb 9.6 oz (64.2 kg)     Gen: In NAD, laying in bed, intubated and sedated  Lungs: Sonorous, no crackles or rhonchi noted., ET tube in place.   Cardiac: Large sternotomy scar, appears well approximated, no murmurs or rubs noted. Chest tube x 2 in place draining serosanguineous fluid.  Skin: Slightly pale/yellow appearing.     Patient dicussed with attending [facility] Medicine physician, Dr.Manuel Perez, who agrees with the plan.     Soren Ahuja MD    PGY2 1/11/2019  [facility]   Pager: [phone] (from 8AM-5:30PM)    This note was created with help of Dragon dictation system. Grammatical /typing errors are not intentional.   1/11/2019  [facility] Faculty Attestation  I have seen and examined the patient.  I have discussed the case with the resident physician(s), Dr. Ahuja  I agree with the findings, assessment and plan.    Aakash Perez MD

## 2021-06-23 NOTE — PLAN OF CARE
Patient Name: Main Carney   MRN: [id number]   Date of Admission: 1/10/2019    Procedure: CORONARY ARTERY BYPASS x 5, LEFT ENDOSCOPIC VEIN HARVEST, INTERNAL MAMMARY ARTERY, EPIAORTIC ULTRASOUND, ANESTHESIA TRANSESOPHAGEAL ECHOCARDIOGRAM    Post Op day #: 2    Subjective (Patient focus/Primary Problem for shift): Pain; Comfort          Pain Goal 0 Pain Rating 0           Pain Medication/ Regime effective to reduce patient pain: tylenol    Objective (Physical assessment):           Rhythm: Normal Sinus Rhythm with 1st degree AV block with BBB            Bowel Activity: no if Yes indicate when:  No BM          Bowel Medications: no            Incision: healing well          Incentive Spirometry Q 1-2 hour when awake:  yes Volume: 500          Epicardial Pacing Wires:  yes            Patient Activity:           Up to chair for meals: yes          Ambulation with RN x2 (Not including CR): not applicable            Is patient in home clothes:no             Chest Tubes   Pleural: yes Draining: yes               Suction: yes              Mediastinal: yes Draining: yes               Suction: yes   Dressing Change Daily:not applicable If No, why? Not applicable                     Urinary Catheter: yes           Preventative WOC consult (need MD order): no      Assessment (Nursing primary shift focus): pt drowsy but arousable; used hmonge  line; AAOx4; clear and diminished lung sounds; room air with clear lung sounds; shallow breathing; hypoactive BS; garces in place; got out of bed with one person assist.  Family at the bedside. Will continue monitoring; p hmong speaking but able to let needs know and speaks minimal english.         Plan (Patient Care Plan/focus):  Mainly focus with pt's pain management, sophy Myrick   1/13/2019   8:02 AM

## 2021-06-23 NOTE — PROGRESS NOTES
CVTS Daily Progress Note   1/15/2019   POD # 4 s/p CABG x 5  DOS 1/11/2019 Dr. Otto  EF 63 %  A1C 5.8    Patient arrived to ICU from OR 1/11 afternoon. He was subsequently extubated and weaned from pressors. Transferred out of ICU on POD #1    Overnight events:  Possible acute gout flair - right ankle - severe pain  Leukocytosis - trending down today     Patient on path from surgical standpoint, but rehab impaired due to painful walking.      Maintaining oxygen saturations on RA   Normotensive   Pain  controlled. +BM /+ flatus.   Hgb 8.3.     Patient main concern: is pain in ankle that is his gout pain.    Patient denies new chest pain, shortness of breath, abdominal pain, calf pain, nausea.  Visit was done with professional      PLAN  Continue diuresis with oral lasix  Prednisone for acute gout flair    OBJECTIVE:    Temp:  [98.2  F (36.8  C)-99.4  F (37.4  C)] 98.9  F (37.2  C)  Heart Rate:  [65-79] 65  Resp:  [17-22] 18  BP: (108-150)/(62-76) 111/69  Wt Readings from Last 2 Encounters:   01/15/19 0610 146 lb 4.8 oz (66.4 kg)   01/14/19 0611 135 lb 9.6 oz (61.5 kg)   01/13/19 0530 147 lb 1.6 oz (66.7 kg)   01/12/19 0556 151 lb 6.4 oz (68.7 kg)   01/11/19 0630 141 lb 9.6 oz (64.2 kg)   01/10/19 1700 143 lb (64.9 kg)   01/10/19 0955 143 lb (64.9 kg)   01/03/19 1114 146 lb (66.2 kg)       Current Medications:    Scheduled Meds:    acetaminophen  650 mg Oral Q6H    Or     acetaminophen  650 mg Rectal Q6H     ascorbic acid (vitamin C)  500 mg Oral Daily with lunch     aspirin  81 mg Oral DAILY     atorvastatin  80 mg Oral QHS     bisacodyl  10 mg Rectal Once     carvedilol  12.5 mg Oral BID with meals     chlorhexidine  15 mL Topical Q12H 09-21     colchicine  0.6 mg Oral BID     docusate sodium  100 mg Oral BID     ferrous sulfate  325 mg Oral BID with meals     furosemide  40 mg Oral BID - diuretic     heparin (PF)  5,000 Units Subcutaneous Q8H FIXED TIMES     insulin aspart (NovoLOG) injection    Subcutaneous TID with meals     insulin aspart (NovoLOG) injection   Subcutaneous QHS     lidocaine  1 patch Transdermal DAILY     magnesium hydroxide  30 mL Oral DAILY     melatonin  3 mg Oral QHS     omeprazole  20 mg Oral QAM AC     polyethylene glycol  17 g Oral DAILY     potassium chloride ER  20 mEq Oral BID     predniSONE  40 mg Oral DAILY     sodium chloride bacteriostatic  1-5 mL Intradermal Once     sodium chloride  10 mL Intravenous Q8H FIXED TIMES       PRN Meds:.acetaminophen, acetaminophen, albumin human, aluminum-magnesium hydroxide-simethicone, bisacodyl, bisacodyl, dextrose 50 % (D50W), glucagon (human recombinant), magnesium hydroxide, naloxone **OR** naloxone, ondansetron, sodium chloride, sodium chloride, sodium phosphates 133 mL, traMADol, traZODone    Cardiographics:    Telemetry monitoring demonstrates SR with rates in the 70s per my personal review.    Imaging:    Xr Chest 2 Views    Result Date: 1/15/2019  XR CHEST 2 VIEWS 1/15/2019 9:07 AM INDICATION: Postop evaluation COMPARISON: 1/14/2019 FINDINGS: Cardiomegaly. There is mild improvement of bilateral lung aeration. There is mild persistent interstitial prominence. No pleural effusion or pneumothorax. Postoperative changes of CABG.      Lab Results (most recent, reviewed):    Hemoglobin (g/dL)   Date Value   01/15/2019 8.3 (L)     WBC (thou/uL)   Date Value   01/15/2019 17.0 (H)     Potassium (mmol/L)   Date Value   01/15/2019 4.5     Creatinine (mg/dL)   Date Value   01/15/2019 0.73     Hemoglobin A1c (%)   Date Value   01/10/2019 5.8     Magnesium (mg/dL)   Date Value   01/15/2019 2.6     Calcium (mg/dL)   Date Value   01/15/2019 8.4 (L)     146 lb 4.8 oz (66.4 kg)  Admit weight  64.8 kg     Physical Exam:    General: Patient seen in Chair. NAD  Professional     Wife in room, her questions address also.    CV: RRR on monitor.   Pulm: Non-labored effort on RA   Incision  C/D/I.  Abd: Soft, NT, ND +BM  : Voiding   Ext: Mild  pedal edema, SCDs in place, warm  Neuro: CNs grossly intact.       ASSESSMENT/PLAN:    Main Carney is a 63 y.o. male with a history of HTN who is now s/p CABG x 5 .    Principal Problem:    Angina, class III (H)  Active Problems:    Unstable angina (H)    Hyperlipidemia LDL goal <70    S/P CABG (coronary artery bypass graft)    On mechanically assisted ventilation (H)      NEURO:   - Scheduled Tylenol and PRN Tramadol for pain  - Oxycodone made patient too sleepy  - Melatonin QHS    CV:   - Normotensive  - Metoprolol 12.5 mg -HR at goal 70s   - Lasix 40 po 2 times a day  - Kdur 20 two times a day  -- ASA 81mg  - Atorvastatin 80mg daily    PULM:   - Maintaining oxygen saturations on RA  - Encourage pulmonary toilet-pulling 500-750 on IS    FEN/GI:  - Continue electrolyte replacement protocol  -K+ supplement   - Diet: Cardiac, ADAT   - Bowel regimen    RENAL:  - Adequate UOP/hr.   - Cr 0.73    HEME:  - Acute blood loss anemia post-op -   - stable hgb 8.3  - Supplemental Vit C and fe   - Hep SQ, [id number]  - Leukocytosis - 17.0 down from 19.6    ID:  - Lisa op ppx complete, afebrile  -  No concerns for infection    ENDO:   -SSI  PPx:   - DVT: SCDs, SQ heparin three times a day   - GI: Omeprazole  - Ambulating with therapy  CR - difficulty standing with painful ankle     DISPO:   -telemetry floor   Home in next day or two

## 2021-06-23 NOTE — ANESTHESIA POSTPROCEDURE EVALUATION
Patient: Main Carney  CORONARY ARTERY BYPASS x 5, LEFT ENDOSCOPIC VEIN HARVEST, INTERNAL MAMMARY ARTERY, EPIAORTIC ULTRASOUND, ANESTHESIA TRANSESOPHAGEAL ECHOCARDIOGRAM  Anesthesia type: general    Patient location: ICU  Last vitals:   Vitals:    01/12/19 1000   BP: 125/72   Pulse: 68   Resp: 20   Temp:    SpO2: 100%     Post vital signs: stable  Level of consciousness: awake and responds to simple questions  Post-anesthesia pain: pain controlled  Post-anesthesia nausea and vomiting: no  Pulmonary: unassisted, return to baseline  Cardiovascular: stable and blood pressure at baseline  Hydration: adequate  Anesthetic events: no    QCDR Measures:  ASA# 11 - Lisa-op Cardiac Arrest: ASA11B - Patient did NOT experience unanticipated cardiac arrest  ASA# 12 - Lisa-op Mortality Rate: ASA12B - Patient did NOT die  ASA# 13 - PACU Re-Intubation Rate: ASA13B - Patient did NOT require a new airway mgmt  ASA# 10 - Composite Anes Safety: ASA10A - No serious adverse event    Additional Notes:

## 2021-06-23 NOTE — PLAN OF CARE
Problem: Discharge Barriers  Goal: Patient's discharge needs are met  Outcome: Adequate for Discharge  Care Plan  Care Management      Care Management Goals of the Day: Discharge    Care Progression Reviewed With: Charge RN, MD, HUC, RNCM, CMSW    Barriers to Discharge: none    Discharge Disposition: home    Expected Discharge Date: 01/17/19      Transportation: family      Care Coordination Narrative: Pt will d/c home with outpatient CR; family to transport. No further d/c needs.

## 2021-06-23 NOTE — ANESTHESIA PROCEDURE NOTES
Arterial Line  Reason for Procedure: hemodynamic monitoring and multiple ABGs  Patient location during procedure: OR pre-induction  Start time: 1/11/2019 11:40 AM  End time: 1/11/2019 11:46 AM  Staffing:  Performing  Anesthesiologist: Octavia Boo MD  Performing CRNA: Timo Argueta CRNA  Sterile Precautions:  sterile barriers used during insertion: cap, mask, sterile gloves, large sheet, and hand hygiene used.  Arterial Line:   Immediately prior to procedure a time out was called to verify the correct patient, procedure, equipment, support staff and site/side marked as required  Laterality: left  Location: brachial  Prepped with: ChloroPrep    Needle gauge: 20 G  Number of Attempts: 1  Secured with: tape, transparent dressing and pressure dressing    1% lidocaine local anesthesia used for skin prep.   See MAR for additional medications given.  Ultrasound evaluation of access site: yes  Vessel patent by US exam    Concurrent real time visualization of needle entry

## 2021-06-23 NOTE — PLAN OF CARE
Patient's pain/discomfort is manageable Progressing      Mobility level is maintained or improved Progressing      Patient's vitals signs are stable Progressing      Patient Name: Main Carney   MRN: [id number]   Date of Admission: 1/10/2019    Procedure: CORONARY ARTERY BYPASS x 5, LEFT ENDOSCOPIC VEIN HARVEST, INTERNAL MAMMARY ARTERY, EPIAORTIC ULTRASOUND, ANESTHESIA TRANSESOPHAGEAL ECHOCARDIOGRAM    Post Op day #:7    Subjective (Patient focus/Primary Problem for shift): rest          Pain Goalno pain Pain Ratingno pain           Pain Medication/ Regime effective to reduce patient pain scheduled tylenol    Objective (Physical assessment):           Rhythm: normal sinus rhythm            Bowel Activity: yes if Yes indicate when: 1/17          Bowel Medications: yes            Incision: healing well          Incentive Spirometry Q 1-2 hour when awake:  yes Volume: 1500          Epicardial Pacing Wires:  no            Patient Activity:           Up to chair for meals: not applicable          Ambulation with RN x2 (Not including CR): yes            Is patient in home clothes:no             Chest Tubes   Pleural: no Draining: not applicable               Suction: not applicable              Mediastinal: no Draining: not applicable               Suction: not applicable   Dressing Change Daily:no If No, why?                      Urinary Catheter: no           Preventative WOC consult (need MD order): no       Assessment (Nursing primary shift focus): Vitals stable, denies pain and shortness of breath. NSR on monitor, HR 50s-70s.    Plan (Patient Care Plan/focus): Rest.      Mellissa Soriano   1/18/2019

## 2021-06-23 NOTE — PROGRESS NOTES
ITP ASSESSMENT   Assessment Day: Initial    Session Number: 1&2  Precautions: surgical/sternal    Diagnosis: CAB    Risk Stratification: High    Referring Provider: Jazmín Otto MD  EXERCISE  Exercise Assessment: Initial       6 Minute Walk Test   Pre   Pre Exercise HR: 57                    Pre Exercise BP: 108/64      Peak  Peak HR: 100                   Peak BP: 109/77    Peak feet: 400    Peak O2 SAT: 100    Peak RPE: 15    Peak MPH: 0.76      Symptoms:  Peak Symptoms: fatigue, sob      5 mins. Post  5 Min Post HR: 52    5 Min Post BP: 132/83                           Exercise Plan  Goals Next 30 days  ADL'S: Resume cooking small meals ans washing dishes.    Leisure: Resume driving    Work: Retired        Education Goals: All goals in this section met    Education Goals Met: Patient can state cardiac s/s and appropriate emergency response.;Has system for taking medication.;Medication review.      Exercise Prescription  Exercise Mode: Nustep;Bike    Frequency: daily    Duration: 20 minutes    Intensity / THR: 20-30 beats above resting heart rate    RPE 11-14  Progression / Met level: 2-3    Resistive Training?: No      Current Exercise (mins/week): 140      Interventions  Home Exercise:  Mode: walking    Frequency: daily    Duration: 20-30 minutes      Education Material : Educational videos;Provide written material;Individual education and counseling;Offer educational classes      Education Completed  Exercise Education Completed: Signs and Symptoms;Medication review;RPE;Emergency Plan;Home Exercise              Exercise Follow-up/Discharge  Follow up/Discharge: Patient will increase walking time/distance.    NUTRITION  Nutrition Assessment: Initial      Nutrition Risk Factors:  Nutrition Risk Factors: Dyslipidemia  Cholesterol: 105  LDL: 53  HDL: 38  Triglycerides: 71      Nutrition Plan  Interventions    Education Completed  Nutrition Education Completed: Discussed small frequent meals and nutritional  "supplements      Goals  Nutrition Goals (Next 30 days): Review Dietitian schedule;Patient will follow a low saturated fat diet;Patient knows appropriate portion size;Patient will follow a low sodium diet;Patient can identify their risk factors for CAD      Goals Met  Nutrition Goals Met: Completed Nutritional Risk Screen;Provided Rate your Plate Survey      Height, Weight, and  BMI  Weight: 139 lb (63 kg)  Height: 5' 1\" (1.549 m)  BMI: 26.28      Nutrition Follow-up  Follow-up/Discharge: Patient's appetite is poor, but he is trying to eat small nutrtious meals         Other Risk Factors  Other Risk Factor Assessment: Initial      HTN Risk Factor: Hypertension      Pre Exercise BP: 122/68  Post Exercise BP: 140/72      Hypertension Plan  Goals  HTN Goals: Follow low sodium diet      Goals Met  HTN Goals Met: Take medication as prescribed;Exercises regularly      HTN Interventions  HTN Interventions: Diet consult;Therapist/patient discussion;Provide written material;Offer educational videos;Offer educational classes      HTN Education Completed  HTN Education Completed: Medication review      Tobacco Risk Factor: NA       PSYCHOSOCIAL  Psychosocial Assessment: Initial       [place] COOP Q of L Summary Score: 32      YUN-D Score: 9      Psychosocial Risk Factor: Stress      Psychosocial Plan  Interventions  If YUN-D > 15 send letter to MD  Interventions: Offer educational videos and classes;Provide written material;Individual education and counseling      Education Completed  Education Completed: Effects of stress on body      Goals  Goals (Next 30 days): Oriented to stress management classes;Identify stressors;Improvement in [place] COOP score      Goals Met  Goals Met: Identified Support system;Practicing stress management skills      Psychosocial Follow-up  Follow-up/Discharge: Patient has recently retired which has reduced his stress             Patient involved in Goal setting?: Yes      Signature: " _____________________________________________________________    Date: __________________    Time: __________________

## 2021-06-24 NOTE — TELEPHONE ENCOUNTER
Patient's son, Jerrica, informed of advise per Dr. Herzog. He verbalizes understanding. No further questions or concerns at this time.

## 2021-06-24 NOTE — TELEPHONE ENCOUNTER
Patient traveling to [place] for the weekend and wanted to get the okay from his cardiologist. Patient had CABG on 1/11/19.    Dr. Herzog what is your recommendation?

## 2021-06-24 NOTE — PROGRESS NOTES
ITP ASSESSMENT   Assessment Day: 30 Day    Session Number: 6  Precautions: surgical/sternal    Diagnosis: CAB    Risk Stratification: High    Referring Provider: Jazmín Otto MD  EXERCISE  Exercise Assessment: Reassessment                                      Exercise Plan  Goals Next 30 days  ADL'S: Resume gradually lifting up to 20 pounds    Leisure: Resume driving    Work: Retired      Education Goals: All goals in this section met    Education Goals Met: Patient can state cardiac s/s and appropriate emergency response.;Has system for taking medication.;Medication review.                          Goals Met  Initial ADL's goals met: Patient has resumed cooking small meals and doing light housework like dishes.    Initial Leisure goals met: Patient has not resumed driving    Intial Work goals met: Retired    Initial Progression: patient is making progress toward his stated goals      Exercise Prescription  Exercise Mode: Nustep;Bike;Treadmill;Hallway Walking    Frequency: 2x week    Duration: 435-45 minuts    Intensity / THR: 20-30 beats above resting heart rate    RPE 11-14  Progression / Met level: 3-4    Resistive Training?: Yes      Current Exercise (mins/week): 210      Interventions  Home Exercise:  Mode: walking    Frequency: daily    Duration: 30 minutes      Education Material : Educational videos;Provide written material;Individual education and counseling;Offer educational classes      Education Completed  Exercise Education Completed: Signs and Symptoms;Medication review;RPE;Home Exercise;Warm up/cool down              Exercise Follow-up/Discharge  Follow up/Discharge: Patient will increase walking time/distance.            NUTRITION  Nutrition Assessment: Reassessment      Nutrition Risk Factors:  Nutrition Risk Factors: Dyslipidemia  Cholesterol: 105  LDL: 53  HDL: 38  Triglycerides: 71      Nutrition Plan  Interventions    Education Completed  Nutrition Education Completed: Discussed small  "frequent meals and nutritional supplements      Goals  Nutrition Goals (Next 30 days): Review Dietitian schedule;Patient will follow a low saturated fat diet;Patient knows appropriate portion size;Patient will follow a low sodium diet;Patient can identify their risk factors for CAD      Goals Met  Nutrition Goals Met: Completed Nutritional Risk Screen;Provided Rate your Plate Survey      Height, Weight, and  BMI  Weight: 139 lb (63 kg)  Height: 5' 1\" (1.549 m)  BMI: 26.28      Nutrition Follow-up  Follow-up/Discharge: Patient's appetite is poor, but he is trying to eat small nutrtious meals       Other Risk Factors  Other Risk Factor Assessment: Reassessment      HTN Risk Factor: Hypertension      Pre Exercise BP: 146/72  Post Exercise BP: 132/70      Hypertension Plan  Goals  HTN Goals: Follow low sodium diet      Goals Met  HTN Goals Met: Take medication as prescribed;Exercises regularly      HTN Interventions  HTN Interventions: Diet consult;Therapist/patient discussion;Provide written material;Offer educational videos;Offer educational classes      HTN Education Completed  HTN Education Completed: Medication review      Tobacco Risk Factor: NA           PSYCHOSOCIAL  Psychosocial Assessment: Reassessment       [place] CO[place] Q of L Summary Score: 32      YUN-D Score: 9      Psychosocial Risk Factor: Stress      Psychosocial Plan  Interventions  Interventions: Offer educational videos and classes;Provide written material;Individual education and counseling      Education Completed  Education Completed: Effects of stress on body      Goals  Goals (Next 30 days): Oriented to stress management classes;Identify stressors;Improvement in [place] CO[place] score      Goals Met  Goals Met: Identified Support system;Practicing stress management skills      Psychosocial Follow-up  Follow-up/Discharge: Patient has recently retired which has reduced his stress           Patient involved in Goal setting?: Yes      Signature: " _____________________________________________________________    Date: __________________    Time: __________________

## 2021-06-24 NOTE — PROGRESS NOTES
ITP ASSESSMENT   Assessment Day: 30 Day/last session    Session Number: 6/ Discharge  Precautions: surgical/sternal    Diagnosis: CAB    Risk Stratification: High    Referring Provider: Jazmín Otto MD  EXERCISE  Exercise Assessment: Discharge       6 Minute Walk Test   Pre   Pre Exercise HR: 57                    Pre Exercise BP: 108/64      Peak  Peak HR: 100                   Peak BP: 109/77    Peak feet: 400    Peak O2 SAT: 100    Peak RPE: 15    Peak MPH: 0.76      Symptoms:  Peak Symptoms: fatigue, sob      5 mins. Post  5 Min Post HR: 52    5 Min Post BP: 132/83                           Exercise Plan  Goals Next 30 days  ADL'S: Resume gradually lifting up to 20 pounds    Leisure: Resume driving    Work: Retired      Education Goals: All goals in this section met    Education Goals Met: Patient can state cardiac s/s and appropriate emergency response.;Has system for taking medication.;Medication review.                          Goals Met  Initial ADL's goals met: Patient has resumed cooking small meals and doing light housework like dishes.    Initial Leisure goals met: Patient has not resumed driving    Intial Work goals met: Retired    Initial Progression: patient is making progress toward his stated goals      Exercise Prescription  Exercise Mode: Nustep;Bike;Treadmill;Hallway Walking    Frequency: 2x week    Duration: 435-45 minuts    Intensity / THR: 20-30 beats above resting heart rate    RPE 11-14  Progression / Met level: 3-4    Resistive Training?: Yes      Current Exercise (mins/week): 210      Interventions  Home Exercise:  Mode: walking    Frequency: daily    Duration: 30 minutes      Education Material : Educational videos;Provide written material;Individual education and counseling;Offer educational classes      Education Completed  Exercise Education Completed: Signs and Symptoms;Medication review;RPE;Home Exercise;Warm up/cool down              Exercise Follow-up/Discharge  Follow  "up/Discharge: Patient has decided to discontinue Phase 2 Cardiac Rehab           NUTRITION  Nutrition Assessment: Discharge      Nutrition Risk Factors:  Nutrition Risk Factors: Dyslipidemia  Cholesterol: 105  LDL: 53  HDL: 38  Triglycerides: 71      Nutrition Plan  Interventions    Education Completed  Nutrition Education Completed: Discussed small frequent meals and nutritional supplements      Goals  Nutrition Goals (Next 30 days): Review Dietitian schedule;Patient will follow a low saturated fat diet;Patient knows appropriate portion size;Patient will follow a low sodium diet;Patient can identify their risk factors for CAD      Goals Met  Nutrition Goals Met: Completed Nutritional Risk Screen;Provided Rate your Plate Survey      Height, Weight, and  BMI  Weight: 143 lb (64.9 kg)  Height: 5' 1\" (1.549 m)  BMI: 27.03      Nutrition Follow-up  Follow-up/Discharge: Patient's appetite is poor, but he is trying to eat small nutrtious meals       Other Risk Factors  Other Risk Factor Assessment: Discharge      HTN Risk Factor: Hypertension      Pre Exercise BP: 146/72  Post Exercise BP: 132/70      Hypertension Plan  Goals  HTN Goals: Follow low sodium diet      Goals Met  HTN Goals Met: Take medication as prescribed;Exercises regularly      HTN Interventions  HTN Interventions: Diet consult;Therapist/patient discussion;Provide written material;Offer educational videos;Offer educational classes      HTN Education Completed  HTN Education Completed: Medication review      Tobacco Risk Factor: NA           PSYCHOSOCIAL  Psychosocial Assessment: Discharge         Psychosocial Risk Factor: Stress      Psychosocial Plan  Interventions  Interventions: Offer educational videos and classes;Provide written material;Individual education and counseling      Education Completed  Education Completed: Effects of stress on body      Goals  No Data Recorded    Goals Met  Goals Met: Identified Support system;Identify " stressors;Practicing stress management skills      Psychosocial Follow-up  Follow-up/Discharge: Patient has recently retired which has reduced his stress           Patient involved in Goal setting?: Yes. Patient has discontinued Phase 2 Cardiac Rehab.      Signature: _____________________________________________________________    Date: __________________    Time: __________________

## 2021-06-24 NOTE — TELEPHONE ENCOUNTER
From a cardiac standpoint he does not have specific contraindications to  travel. He can enjoy his vacation to [place] as long as he is feeling up to it.    Torsten Herzog MD  Non-invasive Cardiology  [facility]

## 2021-06-24 NOTE — TELEPHONE ENCOUNTER
----- Message from Carol Ann Beauchamp sent at 3/4/2019 11:09 AM CST -----  Contact: SON, SYLVIA  General phone call:  PATIENTS SON WANTS TO KNOW IF PATIENT IS OK TO FLY TO [place] FOR THE WEEKEND? PLEASE CALL SYLVIA [phone]  Caller: SYLVIA  Primary cardiologist: DR MORGAN EARL  Detailed reason for call: SEE ABOVE  New or active symptoms? NO  Best phone number: [phone]  Best time to contact: ANY TIME  Ok to leave a detailed message? YES  Device? NO    Additional Info:

## 2021-06-27 NOTE — PROGRESS NOTES
Progress Notes by Ashlee Trotter MD at 1/18/2019 12:24 PM     Author: Ashlee Trotter MD Service: Family Medicine Author Type: Resident    Filed: 1/18/2019  3:25 PM Date of Service: 1/18/2019 12:24 PM Status: Attested    : Ashlee Trotter MD (Resident) Cosigner: Jameson Waller MD at 1/18/2019  3:44 PM    Attestation signed by Jameson Waller MD at 1/18/2019  3:44 PM    [facility]    Main Carney was seen and examined by me on 1/18/2019.    My exam confirms the findings exam reflected in the note(s) by Dr. Trotter .    The note has been reviewed by me and it reflects the assessment and plan we discussed.     Jameson Aiken MD                  [facility] Medicine Daily Progress Note    Assessment & Plan: Main Carney is a 63 y.o. male with a past medical history of HTN, unstable angina admitted for severe multivessal coronary artery disease POD#7 s/p CABGx5.     Principal Problem:    Angina, class III (H)  Active Problems:    HTN (hypertension)    Unstable angina (H)    Hyperlipidemia LDL goal <70    S/P CABG (coronary artery bypass graft)    Paroxysmal atrial fibrillation (H)    Coronary artery disease due to lipid rich plaque    Severe multivessel CAD  Unstable angina  POD#7 s/p CABG x 5   History of progressive angina with dyspnea on exertion. EKG showed first degree AV block, RBBB and Q waves in the inferior leads. 1/10/19 cardiac cath with severe multivessel disease. ECHO shows normal EF of 63% with no WMA or valvular disease. Carotid US without significant stenosis. Now postop day 7 from CABG.  Patient was extubated successfully 1/11 without any issue after CABG procedure. ICU signed off on 1/12/19. Has had mild incisional pain, however, incision healing well. Has been having irregular arrhythmias, which are improving.   - ASA 81mg daily  - Atorvastatin 40 mg  - Clopidogrel 75 mg daily  - Lasix 40mg IV two times a day per CV surgery  - SSI per CV surgery  - Encourage incentive spirometry  - CV  surgery and cardiology following  - Per CV surgery recommendations, likely discharge tomorrow    Arrhythmia   SVT and bradycardia x1 episode: This afternoon had a short burst of atrial fibrillation or atrial tachycardia which are to be SVT.  Patient also with low potassium.  Also with an episode of bradycardia and with rates 35-45. Cardiology consulted and is monitoring patient, likely component of electrolyte disturbances in setting of recent procedure.   -Cardiology following, appreciate recs  -Potassium chloride 20mEq two times a day   -IV diuresis  -Will monitor overnight    Acute gout flare: Has a history of gout and reports that symptoms are similar to past gout flares.  Severe right ankle pain with painful range of motion on exam. Does feel generalized weakness. No erythema or warmth to the area. Likely, this is an acute gout flare.   - Colchicine 0.6mg two times daily   - Continure oral prednisone 40mg daily x 5 days (end on 1/19/19)    Leukocytosis: Has been downtrending.. May be 2/2 to post surgery and acute gout flare. Has been without fevers since admission and post operatively. No evidence of infection on exam.  - AM CBC    HTN: Blood pressures have been borderline elevated.  - Metoprolol tartrate 12.5mg twice daily    Anemia  Has been stabilizing. Presume this is related to blood loss in surgery and chest tubes output. WIll need continued monitoring of this. No other signs of bleeding.  - Goal Hgb >8 as it is a cardiac pt in setting of recent surgery  - Ferrous sulfate 325mg two times a day with meals    Diet: Cardiac diet  Fluids: No fluids   VTE Prophylaxis: Heparin 5000 units every 8 hours.     Discharge Planning discussed with patient and patient's wife  Barriers to discharge: Medical  Anticipated discharge day: Awaiting CT surgery recs.   Disposition:  Home  Status: inpatient .admit  Length of Stay: 8     Patient discussed with attending [facility] physician, Dr. Jameson Aiken,  who agrees with the plan.     Ashlee Trotter PGY1 1/18/2019  [facility]   Pager: [phone] (from 8AM-5:30PM)    This is a [facility] Patient.  Please call the senior pager with any questions or concerns, 24/7.  [phone]    Subjective/Interval events:  No overnight events.  Patient with a 7 beat run of SVT earlier this morning.  Patient feeling well.  Denies any chest pain, shortness of breath, lightheaded dizziness, nausea/vomiting.  Has been able to participate in cardiac rehab.  Ankle pain has improved and has been able to ambulate on it without much difficulty.  Feels that he has more energy today.     ROS: Negative unless noted above.    Objective  Vital signs in last 24 hours  Temp:  [97.5  F (36.4  C)-98.7  F (37.1  C)] 98.3  F (36.8  C)  Heart Rate:  [39-68] 64  Resp:  [18-20] 18  BP: ()/(60-71) 109/61  141 lb 11.2 oz (64.3 kg)  Physical Exam  General appearance: alert, appears stated age, cooperative and no distress  Head: Normocephalic, without obvious abnormality, atraumatic  Eyes: conjunctivae/corneas clear.  EOM's intact.  Lungs: clear to auscultation bilaterally  Heart: regular rate and rhythm, S1, S2 normal, no murmur, click, rub or gallop   Chest: Well-healing sternotomy without any drainage or spreading erythema. Mild tenderness to incision site. Chest tubes discontinued incisions well healing.  Abdomen: soft, non-tender; bowel sounds normal; no masses,  no organomegaly  Extremities: No right ankle tenderness to palpation, no warmth, no erythema noted, no pain with dorsiflexion/plantarflexion.    Pulses: 2+ and symmetric  Skin: No evidence of cyanosis. No other lesions other than surgical sites on lower extremities and anterior chest wall.     Intake/Output last 3 shifts  No intake/output data recorded.  Pertinent Labs   Results from last 7 days   Lab Units 01/18/19  0524 01/17/19  0545 01/16/19  0509 01/15/19  0551   LN-WHITE BLOOD CELL COUNT thou/uL 25.7*   --  16.6* 17.0*   LN-HEMOGLOBIN g/dL 8.9*  --  8.0* 8.3*   LN-HEMATOCRIT % 27.4*  --  24.0* 25.4*   LN-PLATELET COUNT thou/uL 407 325 260 252     Results from last 7 days   Lab Units 01/18/19  0524 01/17/19  0545 01/16/19  0509 01/15/19  0551  01/14/19  0536   LN-SODIUM mmol/L 138  --   --  136  --  134*   LN-POTASSIUM mmol/L 4.2 3.8 4.2 4.5   < > 3.3*   LN-CHLORIDE mmol/L 103  --   --  101  --  99   LN-CO2 mmol/L 27  --   --  29  --  28   LN-BLOOD UREA NITROGEN mg/dL 18  --   --  16  --  17   LN-CREATININE mg/dL 0.74  --   --  0.73  --  0.74   LN-CALCIUM mg/dL 9.0  --   --  8.4*  --  8.4*    < > = values in this interval not displayed.     Results from last 7 days   Lab Units 01/12/19  0339 01/11/19  1733 01/11/19  1620   LN-INR  1.35* 1.48* 1.54*   LN-PARTIAL THROMBOPLASTIN TIME seconds  --   --  71*     Pertinent Radiology    Xr Chest 2 Views    Result Date: 1/10/2019  XR CHEST 2 VIEWS 1/10/2019 4:34 PM INDICATION: Pre op cabg COMPARISON: Chest x-ray 12/26/2018 FINDINGS: No pneumothorax. Mild pulmonary hyperinflation. The lungs are clear and there are no pleural effusions. Stable upper normal heart size. No overt vascular congestion.    Xr Chest 2 Views    Result Date: 12/26/2018  XR CHEST 2 VIEWS 12/26/2018 2:45 PM INDICATION: Sob, harmon COMPARISON: None. FINDINGS: Trace right pleural effusion or pleural scar. Heart size appears normal. Lungs appear clear.    Us Carotid Bilateral    Result Date: 1/10/2019  US CAROTID BILATERAL 1/10/2019 4:31 PM INDICATION: Pre op evaluation. TECHNIQUE: Duplex exam performed utilizing 2D gray-scale imaging, Doppler interrogation with color-flow and spectral waveform analysis. COMPARISON: None. FINDINGS: RIGHT: There is a moderate amount of atheromatous plaque. Normal waveforms with no significant stenosis. LEFT: There is a moderate amount of atheromatous plaque. Normal waveforms with no significant stenosis. Both vertebral arteries and subclavian artery waveforms are normal.  VELOCITY CHART: The following velocities were obtained in the RIGHT carotid system. CCA: 71/12 cm/s ICA: 79/25 cm/s ECA: 106/7 cm/s ICA/CCA: PS 1.11 The following velocities were obtained in the LEFT carotid system. CCA: 75/26 cm/s ICA: 87/31 cm/s ECA: 96/16 cm/s ICA/CCA: PS 1.16                            CONCLUSION: 1.  Mild atheromatous plaque in the carotid arteries. 2.  No significant stenosis on either side. Evaluation based on velocities and NASCET criteria.    Current Medications.   ? acetaminophen  650 mg Oral Q6H    Or   ? acetaminophen  650 mg Rectal Q6H   ? ascorbic acid (vitamin C)  500 mg Oral Daily with lunch   ? aspirin  81 mg Oral DAILY   ? atorvastatin  80 mg Oral QHS   ? atropine  1 mg Intravenous Once   ? bisacodyl  10 mg Rectal Once   ? clopidogrel  75 mg Oral DAILY   ? colchicine  0.6 mg Oral BID   ? docusate sodium  100 mg Oral BID   ? ferrous sulfate  325 mg Oral BID with meals   ? furosemide  40 mg Intravenous BID - diuretic   ? heparin (PF)  5,000 Units Subcutaneous Q8H FIXED TIMES   ? insulin aspart (NovoLOG) injection   Subcutaneous TID with meals   ? insulin aspart (NovoLOG) injection   Subcutaneous QHS   ? lidocaine  1 patch Transdermal DAILY   ? melatonin  3 mg Oral QHS   ? metoprolol tartrate  12.5 mg Oral BID   ? omeprazole  20 mg Oral QAM AC   ? polyethylene glycol  17 g Oral DAILY   ? potassium chloride ER  20 mEq Oral BID   ? predniSONE  20 mg Oral Daily with brkfst    Followed by   ? [START ON 1/21/2019] predniSONE  15 mg Oral Daily with brkfst    Followed by   ? [START ON 1/24/2019] predniSONE  10 mg Oral Daily with brkfst    Followed by   ? [START ON 1/27/2019] predniSONE  5 mg Oral Daily with brkfst   ? predniSONE  40 mg Oral DAILY   ? sodium chloride bacteriostatic  1-5 mL Intradermal Once   ? sodium chloride  10 mL Intravenous Q8H FIXED TIMES     PRN:acetaminophen, acetaminophen, albumin human, aluminum-magnesium hydroxide-simethicone, bisacodyl, bisacodyl, dextrose 50 %  (D50W), glucagon (human recombinant), magnesium hydroxide, naloxone **OR** naloxone, ondansetron, sodium chloride, sodium chloride, sodium phosphates 133 mL, traMADol, traZODone    Ashlee Trotter MD  PGY-1, [facility]

## 2021-06-27 NOTE — PROGRESS NOTES
Progress Notes by Ashlee Trotter MD at 1/17/2019  3:00 PM     Author: Ashlee Trotter MD Service: Family Medicine Author Type: Resident    Filed: 1/17/2019  5:37 PM Date of Service: 1/17/2019  3:00 PM Status: Attested    : Ashlee Trotter MD (Resident) Cosigner: Jameson Rahman MD at 1/17/2019  9:28 PM    Attestation signed by Jameson Rahman MD at 1/17/2019  9:28 PM    1/17/2019  [facility] Faculty Attestation  I have seen and examined the patient.  I have discussed the case with the resident physician(s), Dr. Trotter.  I agree with the findings, assessment and plan.    Jameson Rahman MD                          [facility] Daily Progress Note    Assessment & Plan: Main Carney is a 63 y.o. male with a past medical history of HTN, unstable angina admitted for severe multivessal coronary artery disease POD#6 s/p CABGx5.     Principal Problem:    Angina, class III (H)  Active Problems:    Unstable angina (H)    Hyperlipidemia LDL goal <70    S/P CABG (coronary artery bypass graft)    On mechanically assisted ventilation (H)    Paroxysmal atrial fibrillation (H)    Severe multivessel CAD  Unstable angina  POD#6 s/p CABG x 5   History of progressive angina with dyspnea on exertion. EKG showed first degree AV block, RBBB and Q waves in the inferior leads. 1/10/19 cardiac cath with severe multivessel disease. ECHO shows normal EF of 63% with no WMA or valvular disease. Carotid US without significant stenosis. Now postop day 5 from CABG.  Patient was extubated successfully 1/11 without any issue after CABG procedure. ICU signed off on 1/12/19. Has had mild incisional pain, however, incision healing well.   - ASA 81mg daily  - Atorvastatin 40 mg  -Clopidogrel 75 mg daily  - Lasix 40mg PO two times a day per CV surgery  - SSI per CV surgery  - Encourage incentive spirometry  - CV surgery and cardiology following  - Per CV surgery recommendations, likely discharge in 24-48 hours.     Arrhythmia   SVT and  bradycardia x1 episode: This afternoon had a short burst of atrial fibrillation or atrial tachycardia which are to be SVT.  Patient also with low potassium.  Also with an episode of bradycardia and with rates 35-45.  -Cardiology consult was placed  -They gave 1 dose of Imodium  -Gave 1 extra dose of potassium  -Oral diuresis  -Will monitor overnight    Acute gout flare: Has a history of gout and reports that symptoms are similar to past gout flares.  Severe right ankle pain with painful range of motion on exam. Does feel generalized weakness. No erythema or warmth to the area. Likely, this is an acute gout flare.   - Colchicine 0.6mg two times daily   - Continure oral prednisone 40mg daily x 5 days (end on 1/19/19)    Leukocytosis: Has been downtrending.. May be 2/2 to post surgery and acute gout flare. Has been without fevers since admission and post operatively. No evidence of infection on exam.  - AM CBC  - Monitor for fevers.    HTN: Blood pressures have been borderline elevated.  - Metoprolol tartrate 12.5mg twice daily    Anemia  Patient came in with hgb of 14 on admission, 8.0 today on recheck. Presume this is related to blood loss in surgery and chest tubes output. WIll need continued monitoring of this. No other signs of bleeding  - Goal Hgb >8 as it is a cardiac pt in setting of recent surgery  - Ferrous sulfate 325mg two times a day with meals    Diet: Cardiac diet  Fluids: No fluids   VTE Prophylaxis: Heparin 5000 units every 8 hours.     Discharge Planning discussed with patient and patient's wife  Barriers to discharge: Medical  Anticipated discharge day: Awaiting CT surgery recs.   Disposition:  Home  Status: inpatient .admit  Length of Stay: 7     Patient discussed with attending Health system Medicine physician, Dr. Jameson Aiken, who agrees with the plan.     Ashlee Trotter PGY1 1/17/2019  [facility]   Pager: [phone] (from 8AM-5:30PM)    This is a [facility]  Patient.  Please call the senior pager with any questions or concerns, 24/7.  [phone]    Subjective/Interval events:  This morning when met with patient and his wife in room, patient was feeling improved.  He reported that his ankle felt improved, and has been able to walk around and participate in cardiac rehab.  He reports that he no longer has much tenderness with walking.  He is alert and conversational, has a smile on his face today.  He is looking forward to going home.  He has mild incisional pain, improved from yesterday.  Denies any chest pain, shortness of breath, numbness, tingling, weakness, nausea/vomiting.  Has had no symptoms of presyncope.    Update: Later in the day patient experienced an episode of SVT and bradycardia.  Currently being assessed by cardiology.  Will be monitoring overnight.     ROS: Negative unless noted above.    Objective  Vital signs in last 24 hours  Temp:  [97.5  F (36.4  C)-98.7  F (37.1  C)] 97.5  F (36.4  C)  Heart Rate:  [39-68] 50  Resp:  [16-18] 18  BP: ()/(58-70) 107/70  144 lb 14.4 oz (65.7 kg)  Physical Exam  General appearance: alert, appears stated age, cooperative and no distress  Head: Normocephalic, without obvious abnormality, atraumatic  Eyes: conjunctivae/corneas clear.  EOM's intact.  Lungs: clear to auscultation bilaterally  Heart: regular rate and rhythm, S1, S2 normal, no murmur, click, rub or gallop   Chest: Well-healing sternotomy without any drainage or spreading erythema. Mild tenderness to incision site. Chest tubes discontinued incisions well healing.  Abdomen: soft, non-tender; bowel sounds normal; no masses,  no organomegaly  Extremities: No right ankle tenderness to palpation, no warmth, no erythema noted, no pain with dorsiflexion/plantarflexion.    Pulses: 2+ and symmetric  Skin: No evidence of cyanosis. No other lesions other than surgical sites on lower extremities and anterior chest wall.     Intake/Output last 3 shifts  I/O last 3  completed shifts:  In: 120 [P.O.:120]  Out: -   Pertinent Labs   Results from last 7 days   Lab Units 01/17/19  0545 01/16/19  0509 01/15/19  0551 01/14/19  0536   LN-WHITE BLOOD CELL COUNT thou/uL  --  16.6* 17.0* 19.6*   LN-HEMOGLOBIN g/dL  --  8.0* 8.3* 8.9*   LN-HEMATOCRIT %  --  24.0* 25.4* 26.2*   LN-PLATELET COUNT thou/uL 325 260 252 221     Results from last 7 days   Lab Units 01/17/19  0545 01/16/19  0509 01/15/19  0551  01/14/19  0536 01/13/19  0525   LN-SODIUM mmol/L  --   --  136  --  134* 140   LN-POTASSIUM mmol/L 3.8 4.2 4.5   < > 3.3* 3.9   LN-CHLORIDE mmol/L  --   --  101  --  99 106   LN-CO2 mmol/L  --   --  29  --  28 24   LN-BLOOD UREA NITROGEN mg/dL  --   --  16  --  17 21   LN-CREATININE mg/dL  --   --  0.73  --  0.74 0.77   LN-CALCIUM mg/dL  --   --  8.4*  --  8.4* 8.7    < > = values in this interval not displayed.     Results from last 7 days   Lab Units 01/12/19  0339 01/11/19  1733 01/11/19  1620   LN-INR  1.35* 1.48* 1.54*   LN-PARTIAL THROMBOPLASTIN TIME seconds  --   --  71*     Pertinent Radiology    Xr Chest 2 Views    Result Date: 1/10/2019  XR CHEST 2 VIEWS 1/10/2019 4:34 PM INDICATION: Pre op cabg COMPARISON: Chest x-ray 12/26/2018 FINDINGS: No pneumothorax. Mild pulmonary hyperinflation. The lungs are clear and there are no pleural effusions. Stable upper normal heart size. No overt vascular congestion.    Xr Chest 2 Views    Result Date: 12/26/2018  XR CHEST 2 VIEWS 12/26/2018 2:45 PM INDICATION: Sob, harmon COMPARISON: None. FINDINGS: Trace right pleural effusion or pleural scar. Heart size appears normal. Lungs appear clear.    Us Carotid Bilateral    Result Date: 1/10/2019   CAROTID BILATERAL 1/10/2019 4:31 PM INDICATION: Pre op evaluation. TECHNIQUE: Duplex exam performed utilizing 2D gray-scale imaging, Doppler interrogation with color-flow and spectral waveform analysis. COMPARISON: None. FINDINGS: RIGHT: There is a moderate amount of atheromatous plaque. Normal waveforms  with no significant stenosis. LEFT: There is a moderate amount of atheromatous plaque. Normal waveforms with no significant stenosis. Both vertebral arteries and subclavian artery waveforms are normal. VELOCITY CHART: The following velocities were obtained in the RIGHT carotid system. CCA: 71/12 cm/s ICA: 79/25 cm/s ECA: 106/7 cm/s ICA/CCA: PS 1.11 The following velocities were obtained in the LEFT carotid system. CCA: 75/26 cm/s ICA: 87/31 cm/s ECA: 96/16 cm/s ICA/CCA: PS 1.16                            CONCLUSION: 1.  Mild atheromatous plaque in the carotid arteries. 2.  No significant stenosis on either side. Evaluation based on velocities and NASCET criteria.    Current Medications.   ? acetaminophen  650 mg Oral Q6H    Or   ? acetaminophen  650 mg Rectal Q6H   ? ascorbic acid (vitamin C)  500 mg Oral Daily with lunch   ? aspirin  81 mg Oral DAILY   ? atorvastatin  80 mg Oral QHS   ? atropine  1 mg Intravenous Once   ? bisacodyl  10 mg Rectal Once   ? clopidogrel  75 mg Oral DAILY   ? colchicine  0.6 mg Oral BID   ? docusate sodium  100 mg Oral BID   ? ferrous sulfate  325 mg Oral BID with meals   ? furosemide  40 mg Intravenous BID - diuretic   ? heparin (PF)  5,000 Units Subcutaneous Q8H FIXED TIMES   ? insulin aspart (NovoLOG) injection   Subcutaneous TID with meals   ? insulin aspart (NovoLOG) injection   Subcutaneous QHS   ? lidocaine  1 patch Transdermal DAILY   ? loperamide  2 mg Oral Once   ? melatonin  3 mg Oral QHS   ? metoprolol tartrate  12.5 mg Oral BID   ? omeprazole  20 mg Oral QAM AC   ? polyethylene glycol  17 g Oral DAILY   ? potassium bicarbonate  50 mEq Oral Once   ? potassium chloride ER  20 mEq Oral BID   ? [START ON 1/18/2019] predniSONE  20 mg Oral Daily with brkfst    Followed by   ? [START ON 1/21/2019] predniSONE  15 mg Oral Daily with brkfst    Followed by   ? [START ON 1/24/2019] predniSONE  10 mg Oral Daily with brkfst    Followed by   ? [START ON 1/27/2019] predniSONE  5 mg Oral  Daily with brkfst   ? predniSONE  40 mg Oral DAILY   ? sodium chloride bacteriostatic  1-5 mL Intradermal Once   ? sodium chloride  10 mL Intravenous Q8H FIXED TIMES     PRN:acetaminophen, acetaminophen, albumin human, aluminum-magnesium hydroxide-simethicone, bisacodyl, bisacodyl, dextrose 50 % (D50W), glucagon (human recombinant), magnesium hydroxide, naloxone **OR** naloxone, ondansetron, sodium chloride, sodium chloride, sodium phosphates 133 mL, traMADol, traZODone    Ashlee Trotter MD  PGY-1, [facility]

## 2021-06-27 NOTE — PROGRESS NOTES
Progress Notes by Ashlee Trotter MD at 1/15/2019  6:56 AM     Author: Ashlee Trotter MD Service: Family Medicine Author Type: Resident    Filed: 1/15/2019  3:25 PM Date of Service: 1/15/2019  6:56 AM Status: Attested    : Ashlee Trotter MD (Resident) Cosigner: Jameson Rahman MD at 1/15/2019  4:01 PM    Attestation signed by Jameson Rahman MD at 1/15/2019  4:01 PM    1/15/2019  [facility] Faculty Attestation  I have seen and examined the patient.  I have discussed the case with the resident physician(s), Dr. Trotter.  I agree with the findings, assessment and plan.  Prednisone discussed with CV surgery team by resident    Jameson Rahman MD                          [facility] Daily Progress Note    Assessment & Plan: Main Carney is a 63 y.o. male with a past medical history of HTN, unstable angina admitted for severe multivessal coronary artery disease POD#4 s/p CABGx5.     Principal Problem:    Angina, class III (H)  Active Problems:    Unstable angina (H)    Hyperlipidemia LDL goal <70    S/P CABG (coronary artery bypass graft)    On mechanically assisted ventilation (H)    Severe multivessel CAD  Unstable angina  POD#4 s/p CABG x 5   History of progressive angina with dyspnea on exertion. EKG showed first degree AV block, RBBB and Q waves in the inferior leads. 1/10/19 cardiac cath with severe multivessel disease. ECHO shows normal EF of 63% with no WMA or valvular disease. Carotid US without significant stenosis. Now postop day 3 from CABG.  Patient was extubated successfully 1/11 without any issue after CABG procedure. ICU signed off on 1/12/19. Denies any chest pain or shortness of breath today. Saturating well on room air. Unable to participate in PT due to severe leg pain.    - ASA 81mg daily  - Atorvastatin 40 mg  - Carvedilol 12.5 mg two times a day  - Lasix 40mg PO two times a day per CV surgery  - SSI per CV surgery  - Encourage incentive spirometry  - CV surgery and cardiology  following  - Per CV surgery recommendations, likely discharge in 24-48 hours.     Acute gout flare: Has a history of gout and reports that symptoms are similar to past gout flares.  Severe right ankle pain with painful range of motion on exam. Does feel generalized weakness. No erythema or warmth to the area. Likely, this is an acute gout flare.   - Colchicine 0.6mg two times daily   - Start oral prednisone 40mg daily until symptoms begin to resolve, then taper for 7-10 days.     Leukocytosis: WBC decreased to 17.0 from 19.6. May be 2/2 to post surgery and acute gout flare. Has been without fevers since admission and post operatively. No evidence of infection on exam.  - AM CBC  - Monitor for fevers.    HTN: Blood pressures have been borderline elevated. Pt states he has been on BP meds in the past, but does not recall the names.  - Metoprolol tartrate 25mg twice daily    Anemia  Patient came in with hgb of 14 on admission, has been around ~8.6-8.9 during the past few days.  Presume this is related to blood loss in surgery and chest tubes output. WIll need continued monitoring of this.  - Goal Hgb >8 as it is a cardiac pt in setting of recent surgery  - Ferrous sulfate 325mg two times a day with meals    Diet: Cardiac diet  Fluids: No fluids   VTE Prophylaxis: Heparin 5000 units every 8 hours.     Discharge Planning discussed with patient and patient's wife  Barriers to discharge: Medical  Anticipated discharge day: 1/16/19  Disposition:  Home  Status: inpatient .admit  Length of Stay: 5     Patient discussed with attending [facility] Medicine physician, Dr. Jameson Aiken, who agrees with the plan.     Ashlee Trotter PGY1 1/15/2019  [facility]   Pager: [phone] (from 8AM-5:30PM)    This is a [facility] Patient.  Please call the senior pager with any questions or concerns, 24/7.  [phone]    Subjective/Interval events:  Patient reports that he has increased pain in his  right ankle.  He reports a history of gout, and this pain is similar to his past gout flares.  He was started on colchicine and Tylenol yesterday, however, he reports no resolution in pain.  He reports that he is unable to bear weight on his right foot, and it is painful with range of motion.  Has not been able to participate fully in PT.  He also appears tired on exam today.  Prior to entering the room, he was given pain medication.  He denies any chest pain, incisional pain, shortness of breath, numbness, tingling, weakness, nausea/vomiting.  Has had no symptoms of presyncope.    ROS: Negative unless noted above.    Objective  Vital signs in last 24 hours  Temp:  [98.2  F (36.8  C)-99.7  F (37.6  C)] 98.2  F (36.8  C)  Heart Rate:  [] 66  Resp:  [17-22] 18  BP: (106-150)/(59-76) 150/73  146 lb 4.8 oz (66.4 kg)  Physical Exam  General appearance: alert, appears stated age, cooperative and no distress  Head: Normocephalic, without obvious abnormality, atraumatic  Eyes: conjunctivae/corneas clear.  EOM's intact.  Lungs: clear to auscultation bilaterally  Heart: regular rate and rhythm, S1, S2 normal, no murmur, click, rub or gallop   Chest: Well-healing sternotomy without any drainage or spreading erythema.  Chest tubes discontinued incisions well healing.  Abdomen: soft, non-tender; bowel sounds normal; no masses,  no organomegaly  Extremities: Right ankle tenderness to palpation, with minimal warmth, no erythema noted, severe pain with weight bearing or dorsi/plantarflexion.   Pulses: 2+ and symmetric  Skin: No evidence of cyanosis. No other lesions other than surgical sites on lower extremities and anterior chest wall.     Intake/Output last 3 shifts  I/O last 3 completed shifts:  In: -   Out: 500 [Urine:500]  Pertinent Labs   Results from last 7 days   Lab Units 01/15/19  0551 01/14/19  0536 01/13/19  0525 01/12/19  0340   LN-WHITE BLOOD CELL COUNT thou/uL 17.0* 19.6*  --  11.0   LN-HEMOGLOBIN g/dL 8.3*  8.9* 8.4* 8.1*   LN-HEMATOCRIT % 25.4* 26.2*  --  24.6*   LN-PLATELET COUNT thou/uL 252 221 176 156     Results from last 7 days   Lab Units 01/15/19  0551 01/14/19  1941 01/14/19  0536 01/13/19  0525   LN-SODIUM mmol/L 136  --  134* 140   LN-POTASSIUM mmol/L 4.5 4.2 3.3* 3.9   LN-CHLORIDE mmol/L 101  --  99 106   LN-CO2 mmol/L 29  --  28 24   LN-BLOOD UREA NITROGEN mg/dL 16  --  17 21   LN-CREATININE mg/dL 0.73  --  0.74 0.77   LN-CALCIUM mg/dL 8.4*  --  8.4* 8.7     Results from last 7 days   Lab Units 01/12/19  0339 01/11/19  1733 01/11/19  1620   LN-INR  1.35* 1.48* 1.54*   LN-PARTIAL THROMBOPLASTIN TIME seconds  --   --  71*     Pertinent Radiology    Xr Chest 2 Views    Result Date: 1/10/2019  XR CHEST 2 VIEWS 1/10/2019 4:34 PM INDICATION: Pre op cabg COMPARISON: Chest x-ray 12/26/2018 FINDINGS: No pneumothorax. Mild pulmonary hyperinflation. The lungs are clear and there are no pleural effusions. Stable upper normal heart size. No overt vascular congestion.    Xr Chest 2 Views    Result Date: 12/26/2018  XR CHEST 2 VIEWS 12/26/2018 2:45 PM INDICATION: Sob, harmon COMPARISON: None. FINDINGS: Trace right pleural effusion or pleural scar. Heart size appears normal. Lungs appear clear.    Us Carotid Bilateral    Result Date: 1/10/2019  US CAROTID BILATERAL 1/10/2019 4:31 PM INDICATION: Pre op evaluation. TECHNIQUE: Duplex exam performed utilizing 2D gray-scale imaging, Doppler interrogation with color-flow and spectral waveform analysis. COMPARISON: None. FINDINGS: RIGHT: There is a moderate amount of atheromatous plaque. Normal waveforms with no significant stenosis. LEFT: There is a moderate amount of atheromatous plaque. Normal waveforms with no significant stenosis. Both vertebral arteries and subclavian artery waveforms are normal. VELOCITY CHART: The following velocities were obtained in the RIGHT carotid system. CCA: 71/12 cm/s ICA: 79/25 cm/s ECA: 106/7 cm/s ICA/CCA: PS 1.11 The following velocities were  obtained in the LEFT carotid system. CCA: 75/26 cm/s ICA: 87/31 cm/s ECA: 96/16 cm/s ICA/CCA: PS 1.16                            CONCLUSION: 1.  Mild atheromatous plaque in the carotid arteries. 2.  No significant stenosis on either side. Evaluation based on velocities and NASCET criteria.    Current Medications.   ? acetaminophen  650 mg Oral Q6H    Or   ? acetaminophen  650 mg Rectal Q6H   ? ascorbic acid (vitamin C)  500 mg Oral Daily with lunch   ? aspirin  81 mg Oral DAILY   ? atorvastatin  80 mg Oral QHS   ? bisacodyl  10 mg Rectal Once   ? chlorhexidine  15 mL Topical Q12H 09-21   ? colchicine  0.6 mg Oral BID   ? docusate sodium  100 mg Oral BID   ? ferrous sulfate  325 mg Oral BID with meals   ? furosemide  40 mg Oral BID - diuretic   ? heparin (PF)  5,000 Units Subcutaneous Q8H FIXED TIMES   ? insulin aspart (NovoLOG) injection   Subcutaneous TID with meals   ? insulin aspart (NovoLOG) injection   Subcutaneous QHS   ? lidocaine  1 patch Transdermal DAILY   ? magnesium hydroxide  30 mL Oral DAILY   ? melatonin  3 mg Oral QHS   ? metoprolol tartrate  25 mg Oral BID   ? omeprazole  20 mg Oral QAM AC   ? polyethylene glycol  17 g Oral DAILY   ? potassium chloride ER  20 mEq Oral BID   ? sodium chloride bacteriostatic  1-5 mL Intradermal Once   ? sodium chloride  10 mL Intravenous Q8H FIXED TIMES     PRN:acetaminophen, acetaminophen, albumin human, aluminum-magnesium hydroxide-simethicone, bisacodyl, bisacodyl, dextrose 50 % (D50W), glucagon (human recombinant), magnesium hydroxide, naloxone **OR** naloxone, ondansetron, sodium chloride, sodium chloride, sodium phosphates 133 mL, traMADol, traZODone    Ashlee Trotter MD  PGY-1, [facility]

## 2021-06-27 NOTE — PROGRESS NOTES
Progress Notes by Ashlee Trotter MD at 1/14/2019  1:10 PM     Author: Ashlee Trotter MD Service: Family Medicine Author Type: Resident    Filed: 1/14/2019  1:38 PM Date of Service: 1/14/2019  1:10 PM Status: Attested    : Ashlee Trotter MD (Resident) Cosigner: Jameson Waller MD at 1/14/2019  2:49 PM    Attestation signed by Jameson Waller MD at 1/14/2019  2:49 PM    [facility] Faculty    Main Carney was seen and examined by me on 1/14/2019.    My exam confirms the findings exam reflected in the note(s) by Dr. Trotter .    The note has been reviewed by me and it reflects the assessment and plan we discussed.     Jameson Aiken MD                  [facility] Medicine Daily Progress Note    Assessment & Plan: Main Carney is a 63 y.o. male with a past medical history of HTN, unstable angina admitted for severe multivessal coronary artery disease POD#3 s/p CABGx5.     Principal Problem:    Angina, class III (H)  Active Problems:    Unstable angina (H)    Hyperlipidemia LDL goal <70    S/P CABG (coronary artery bypass graft)    On mechanically assisted ventilation (H)    Severe multivessel CAD  Unstable angina  POD#3 s/p CABG x 5   History of progressive angina with dyspnea on exertion. EKG showed first degree AV block, RBBB and Q waves in the inferior leads. 1/10/19 cardiac cath with severe multivessel disease. ECHO shows normal EF of 63% with no WMA or valvular disease. Carotid US without significant stenosis. Now postop day 3 from CABG.  Patient was extubated successfully 1/11 without any issue after CABG procedure. ICU signed off on 1/12/19. Patient feeling increased fatigue and heaviness of breathing. Saturating well on room air.   - ASA 81mg daily  - Atorvastatin 40 mg  - Carvedilol 12.5 mg two times a day  - Lasix 40mg PO two times a day per CV surgery  - SSI per CV surgery  - CV surgery and cardiology following  - Per CV surgery recommendations, likely discharge in 24-48 hours.     Leukocytosis: WBC  elevated to 19.6 today. Has been without fevers since admission and post operatively. No evidence of infection on exam.  - AM CBC  - Monitor for fevers.    Right knee pain: Has a history of gout. Mild right knee tenderness on exam with full range of motion. Does feel generalized weakness. No erythema or warmth to the area.   - Started on colchicine 0.6mg two times daily per cardiology recs.     HTN: Blood pressures have been borderline elevated. Pt states he has been on BP meds in the past, but does not recall the names.  - Per cardiology recs, restarted on metoprolol tartrate 25mg twice daily    Anemia  Patient came in with hgb of 14 on admission, has been increasing to 8.9 today. Presume this is related to blood loss in surgery and chest tubes output. WIll need continued monitoring of this.  - Goal Hgb >8 as it is a cardiac pt in setting of recent surgery    Diet: Cardiac diet following   Fluids: No fluids   VTE Prophylaxis: Heparin 5000 units every 8 hours.     Discharge Planning discussed with patient and patient's wife  Barriers to discharge: Medical  Anticipated discharge day: 1/15/19-1/16/19  Disposition:  Home  Status: inpatient .admit  Length of Stay: 4     Patient discussed with attending [facility] Medicine physician, Dr. Jameson Aiken, who agrees with the plan.     Ashlee BRUCE Sergo PGY1 1/14/2019  [facility] Medicine   Pager: [phone] (from 8AM-5:30PM)    This is a [facility] Medicine Patient.  Please call the senior pager with any questions or concerns, 24/7.  [phone]    Subjective/Interval events:  Patient feeling increased fatigue and increased heaviness in breathing today.  Denies any chest pain or incisional pain.  His main concern today is that he is experiencing bilateral knee pain, has a history of gout.  Was able to ambulate yesterday, and feels tired from that episode.  Had a run of A. fib overnight, patient appeared asymptomatic at the time.  Has had no symptoms of  presyncope.     ROS: Negative unless noted above.    Objective  Vital signs in last 24 hours  Temp:  [98.3  F (36.8  C)-99.9  F (37.7  C)] 98.7  F (37.1  C)  Heart Rate:  [] 69  Resp:  [17-22] 17  BP: (105-136)/(59-83) 106/59  135 lb 9.6 oz (61.5 kg)  Physical Exam  General appearance: alert, appears stated age, cooperative and no distress  Head: Normocephalic, without obvious abnormality, atraumatic  Eyes: conjunctivae/corneas clear.  EOM's intact.  Lungs: clear to auscultation bilaterally  Heart: regular rate and rhythm, S1, S2 normal, no murmur, click, rub or gallop   Chest: Well-healing sternotomy without any drainage or spreading erythema.  Chest tubes discontinued incisions well healing.  Abdomen: soft, non-tender; bowel sounds normal; no masses,  no organomegaly  Extremities: Right knee with mild tenderness, however, no surrounding erythema, warmth, or effusion. Left knee with mild tenderness, no surrounding erythema, warmth or effusion.   Pulses: 2+ and symmetric  Skin: No evidence of cyanosis. No other lesions other than surgical sites on lower extremities and anterior chest wall.     Intake/Output last 3 shifts  I/O last 3 completed shifts:  In: 640 [P.O.:640]  Out: 2000 [Urine:2000]  Pertinent Labs   Results from last 7 days   Lab Units 01/14/19 0536 01/13/19  0525 01/12/19  0340 01/11/19  1733   LN-WHITE BLOOD CELL COUNT thou/uL 19.6*  --  11.0 15.7*   LN-HEMOGLOBIN g/dL 8.9* 8.4* 8.1* 10.2*   LN-HEMATOCRIT % 26.2*  --  24.6* 29.7*   LN-PLATELET COUNT thou/uL 221 176 156 189     Results from last 7 days   Lab Units 01/14/19  0536 01/13/19  0525 01/12/19  0340   LN-SODIUM mmol/L 134* 140 144   LN-POTASSIUM mmol/L 3.3* 3.9 4.1   LN-CHLORIDE mmol/L 99 106 115*   LN-CO2 mmol/L 28 24 22   LN-BLOOD UREA NITROGEN mg/dL 17 21 14   LN-CREATININE mg/dL 0.74 0.77 0.76   LN-CALCIUM mg/dL 8.4* 8.7 8.1*     Results from last 7 days   Lab Units 01/12/19  0339 01/11/19  1733 01/11/19  1620   LN-INR  1.35* 1.48*  1.54*   LN-PARTIAL THROMBOPLASTIN TIME seconds  --   --  71*     Pertinent Radiology    Xr Chest 2 Views    Result Date: 1/10/2019  XR CHEST 2 VIEWS 1/10/2019 4:34 PM INDICATION: Pre op cabg COMPARISON: Chest x-ray 12/26/2018 FINDINGS: No pneumothorax. Mild pulmonary hyperinflation. The lungs are clear and there are no pleural effusions. Stable upper normal heart size. No overt vascular congestion.    Xr Chest 2 Views    Result Date: 12/26/2018  XR CHEST 2 VIEWS 12/26/2018 2:45 PM INDICATION: Sob, harmon COMPARISON: None. FINDINGS: Trace right pleural effusion or pleural scar. Heart size appears normal. Lungs appear clear.    Us Carotid Bilateral    Result Date: 1/10/2019  US CAROTID BILATERAL 1/10/2019 4:31 PM INDICATION: Pre op evaluation. TECHNIQUE: Duplex exam performed utilizing 2D gray-scale imaging, Doppler interrogation with color-flow and spectral waveform analysis. COMPARISON: None. FINDINGS: RIGHT: There is a moderate amount of atheromatous plaque. Normal waveforms with no significant stenosis. LEFT: There is a moderate amount of atheromatous plaque. Normal waveforms with no significant stenosis. Both vertebral arteries and subclavian artery waveforms are normal. VELOCITY CHART: The following velocities were obtained in the RIGHT carotid system. CCA: 71/12 cm/s ICA: 79/25 cm/s ECA: 106/7 cm/s ICA/CCA: PS 1.11 The following velocities were obtained in the LEFT carotid system. CCA: 75/26 cm/s ICA: 87/31 cm/s ECA: 96/16 cm/s ICA/CCA: PS 1.16                            CONCLUSION: 1.  Mild atheromatous plaque in the carotid arteries. 2.  No significant stenosis on either side. Evaluation based on velocities and NASCET criteria.    Current Medications.   ? acetaminophen  650 mg Oral Q6H    Or   ? acetaminophen  650 mg Rectal Q6H   ? ascorbic acid (vitamin C)  500 mg Oral Daily with lunch   ? aspirin  81 mg Oral DAILY   ? atorvastatin  80 mg Oral QHS   ? bisacodyl  10 mg Rectal Once   ? chlorhexidine  15 mL  Topical Q12H 09-21   ? colchicine  0.6 mg Oral BID   ? docusate sodium  100 mg Oral BID   ? ferrous sulfate  325 mg Oral BID with meals   ? furosemide  40 mg Oral BID - diuretic   ? heparin (PF)  5,000 Units Subcutaneous Q8H FIXED TIMES   ? insulin aspart (NovoLOG) injection   Subcutaneous TID with meals   ? insulin aspart (NovoLOG) injection   Subcutaneous QHS   ? lidocaine  1 patch Transdermal DAILY   ? magnesium hydroxide  30 mL Oral DAILY   ? melatonin  3 mg Oral QHS   ? metoprolol tartrate  25 mg Oral BID   ? omeprazole  20 mg Oral QAM AC   ? polyethylene glycol  17 g Oral DAILY   ? potassium chloride ER  20 mEq Oral BID   ? sodium chloride bacteriostatic  1-5 mL Intradermal Once   ? sodium chloride  10 mL Intravenous Q8H FIXED TIMES     PRN:acetaminophen, acetaminophen, albumin human, aluminum-magnesium hydroxide-simethicone, bisacodyl, bisacodyl, dextrose 50 % (D50W), glucagon (human recombinant), magnesium hydroxide, naloxone **OR** naloxone, ondansetron, sodium chloride, sodium chloride, sodium phosphates 133 mL, traMADol, traZODone    Ashlee Trotter MD  PGY-1, [facility]

## 2021-06-27 NOTE — PROGRESS NOTES
Progress Notes by Nora Billingsley MD at 1/19/2019  8:12 AM     Author: Nora Billingsley MD Service: Family Medicine Author Type: Resident    Filed: 1/19/2019 12:14 PM Date of Service: 1/19/2019  8:12 AM Status: Attested    : Nora Billingsley MD (Resident) Cosigner: Jameson Rahman MD at 1/19/2019 12:18 PM    Attestation signed by Jameson Rahman MD at 1/19/2019 12:18 PM (Updated)    1/19/2019  [facility] Faculty Attestation  I have seen and examined the patient.  I have discussed the case with the resident physician(s), Dr. Billingsley.  I agree with the findings, assessment and plan.    Seen with an   Jameson Rahman MD                          [facility] Medicine Daily Progress Note    Assessment & Plan: Main Carney is a 63 y.o. male with a past medical history of HTN, unstable angina admitted for severe multivessal coronary artery disease POD#7 s/p CABGx5.     Principal Problem:    Angina, class III (H)  Active Problems:    HTN (hypertension)    Unstable angina (H)    Hyperlipidemia LDL goal <70    S/P CABG (coronary artery bypass graft)    Paroxysmal atrial fibrillation (H)    Coronary artery disease due to lipid rich plaque    Severe multivessel CAD  Unstable angina  POD#8 s/p CABG x 5   History of progressive angina with dyspnea on exertion. EKG showed first degree AV block, RBBB and Q waves in the inferior leads. 1/10/19 cardiac cath with severe multivessel disease. ECHO shows normal EF of 63% with no WMA or valvular disease. Carotid US without significant stenosis. Now postop day 8 from CABG.  Patient was extubated successfully 1/11 without any issue after CABG procedure. ICU signed off on 1/12/19. Incisional pain improved. Has been having irregular arrhythmias, which are improving.   - ASA 81mg daily  - Atorvastatin 40 mg  - Clopidogrel 75 mg daily  - Lasix 40mg IV two times a day per CV surgery  - SSI per CV surgery  - Encourage incentive spirometry  - CV surgery and cardiology  following  - OK to discharge per cardiology and CV surgery    Arrhythmia   SVT and bradycardia x1 episode: Resolved. Cardiology consulted and is monitoring patient, likely component of electrolyte disturbances in setting of recent procedure.   -Cardiology following, appreciate recs  -Potassium chloride 20mEq two times a day     Acute gout flare: Improved. Has a history of gout and reports that symptoms are similar to past gout flares.  Pain and motion better today  - Colchicine 0.6mg two times daily   - Finished prednisone 40mg daily x 5 days burst    Leukocytosis: May be 2/2 to post surgery and acute gout flare. Also taking prednisone burst. Has been without fevers since admission and post operatively. No evidence of infection on exam.    HTN: Stable. Blood pressures have been stable.   - Metoprolol tartrate 12.5mg twice daily    Anemia  Has been stabilizing. Presume this is related to blood loss in surgery and chest tubes output. No other signs of bleeding.  - Goal Hgb >8 as it is a cardiac pt in setting of recent surgery  - Ferrous sulfate 325mg two times a day with meals    Diet: Cardiac diet  Fluids: No fluids   VTE Prophylaxis: Heparin 5000 units every 8 hours.     Discharge Planning discussed with patient and patient's wife  Barriers to discharge: Medical  Anticipated discharge day: Today    Disposition:  Home  Status: inpatient .admit  Length of Stay: 9     Patient discussed with attending [facility] Medicine physician, Dr. Jameson Aiken, who agrees with the plan.     Nora Billingsley PGY2 1/19/2019  [facility]   Pager: [phone] (from 8AM-5:30PM)    This is a [facility] Patient.  Please call the senior pager with any questions or concerns, 24/7.  [phone]    Subjective/Interval events:  Patient seated in chair.  Wife at bedside.  No acute events overnight.  Patient states he feels well overall.  Pain in his right ankle has continued to improve.  Patient is  comfortable with weightbearing.  Patient denies any chest or incisional pain. Patient has been eating, urinating, and stooling normally.  Patient denies any new symptoms.  Discussed the plan to discharge per cardiology.  Patient and family in agreement.  All questions answered.    Objective  Vital signs in last 24 hours  Temp:  [97.7  F (36.5  C)-98.5  F (36.9  C)] 97.7  F (36.5  C)  Heart Rate:  [60-85] 62  Resp:  [16-20] 16  BP: ()/(56-66) 99/57  139 lb (63 kg)  Physical Exam    General Appearance:    Alert, cooperative, no distress, appears stated age   Head:    Normocephalic, without obvious abnormality, atraumatic   Eyes:    conjunctiva/corneas clear   Lungs:     Clear to auscultation bilaterally, respirations unlabored   Chest Wall:     Incision healing with no drainage or erythema   Heart:    Regular rate and rhythm, S1 and S2 normal, no murmur, rub   or gallop   Abdomen:     Soft, non-tender, bowel sounds active all four quadrants,     no masses, no organomegaly   Extremities:   Extremities normal, atraumatic, no cyanosis or edema. Mild tenderness with palpation of right ankle.    Pulses:   2+ and symmetric all extremities   Skin:   Skin color, texture, turgor normal, no rashes or lesions   Neurologic:  Alert and oriented ×4.  CNII-XII grossly Intact.     Intake/Output last 3 shifts  I/O last 3 completed shifts:  In: 740 [P.O.:720; I.V.:20]  Out: -   Pertinent Labs   Results from last 7 days   Lab Units 01/19/19  0627 01/18/19  0524 01/17/19  0545 01/16/19  0509   LN-WHITE BLOOD CELL COUNT thou/uL 29.4* 25.7*  --  16.6*   LN-HEMOGLOBIN g/dL 9.4* 8.9*  --  8.0*   LN-HEMATOCRIT % 29.2* 27.4*  --  24.0*   LN-PLATELET COUNT thou/uL 454* 407 325 260     Results from last 7 days   Lab Units 01/19/19  0627 01/18/19  0524 01/17/19  0545  01/15/19  0551  01/14/19  0536   LN-SODIUM mmol/L  --  138  --   --  136  --  134*   LN-POTASSIUM mmol/L 4.4 4.2 3.8   < > 4.5   < > 3.3*   LN-CHLORIDE mmol/L  --  103  --    --  101  --  99   LN-CO2 mmol/L  --  27  --   --  29  --  28   LN-BLOOD UREA NITROGEN mg/dL  --  18  --   --  16  --  17   LN-CREATININE mg/dL  --  0.74  --   --  0.73  --  0.74   LN-CALCIUM mg/dL  --  9.0  --   --  8.4*  --  8.4*    < > = values in this interval not displayed.         Pertinent Radiology    Xr Chest 2 Views    Result Date: 1/10/2019  XR CHEST 2 VIEWS 1/10/2019 4:34 PM INDICATION: Pre op cabg COMPARISON: Chest x-ray 12/26/2018 FINDINGS: No pneumothorax. Mild pulmonary hyperinflation. The lungs are clear and there are no pleural effusions. Stable upper normal heart size. No overt vascular congestion.    Xr Chest 2 Views    Result Date: 12/26/2018  XR CHEST 2 VIEWS 12/26/2018 2:45 PM INDICATION: Sob, harmon COMPARISON: None. FINDINGS: Trace right pleural effusion or pleural scar. Heart size appears normal. Lungs appear clear.    Us Carotid Bilateral    Result Date: 1/10/2019  US CAROTID BILATERAL 1/10/2019 4:31 PM INDICATION: Pre op evaluation. TECHNIQUE: Duplex exam performed utilizing 2D gray-scale imaging, Doppler interrogation with color-flow and spectral waveform analysis. COMPARISON: None. FINDINGS: RIGHT: There is a moderate amount of atheromatous plaque. Normal waveforms with no significant stenosis. LEFT: There is a moderate amount of atheromatous plaque. Normal waveforms with no significant stenosis. Both vertebral arteries and subclavian artery waveforms are normal. VELOCITY CHART: The following velocities were obtained in the RIGHT carotid system. CCA: 71/12 cm/s ICA: 79/25 cm/s ECA: 106/7 cm/s ICA/CCA: PS 1.11 The following velocities were obtained in the LEFT carotid system. CCA: 75/26 cm/s ICA: 87/31 cm/s ECA: 96/16 cm/s ICA/CCA: PS 1.16                            CONCLUSION: 1.  Mild atheromatous plaque in the carotid arteries. 2.  No significant stenosis on either side. Evaluation based on velocities and NASCET criteria.    Current Medications.   ? acetaminophen  650 mg Oral Q6H    Or    ? acetaminophen  650 mg Rectal Q6H   ? ascorbic acid (vitamin C)  500 mg Oral Daily with lunch   ? aspirin  81 mg Oral DAILY   ? atorvastatin  80 mg Oral QHS   ? atropine  1 mg Intravenous Once   ? bisacodyl  10 mg Rectal Once   ? clopidogrel  75 mg Oral DAILY   ? colchicine  0.6 mg Oral BID   ? docusate sodium  100 mg Oral BID   ? ferrous sulfate  325 mg Oral BID with meals   ? furosemide  40 mg Intravenous BID - diuretic   ? heparin (PF)  5,000 Units Subcutaneous Q8H FIXED TIMES   ? insulin aspart (NovoLOG) injection   Subcutaneous TID with meals   ? insulin aspart (NovoLOG) injection   Subcutaneous QHS   ? lidocaine  1 patch Transdermal DAILY   ? melatonin  3 mg Oral QHS   ? metoprolol tartrate  12.5 mg Oral BID   ? omeprazole  20 mg Oral QAM AC   ? polyethylene glycol  17 g Oral DAILY   ? potassium chloride ER  20 mEq Oral BID   ? predniSONE  20 mg Oral Daily with brkfst    Followed by   ? [START ON 1/21/2019] predniSONE  15 mg Oral Daily with brkfst    Followed by   ? [START ON 1/24/2019] predniSONE  10 mg Oral Daily with brkfst    Followed by   ? [START ON 1/27/2019] predniSONE  5 mg Oral Daily with brkfst   ? predniSONE  40 mg Oral DAILY   ? sodium chloride bacteriostatic  1-5 mL Intradermal Once   ? sodium chloride  10 mL Intravenous Q8H FIXED TIMES     PRN:acetaminophen, acetaminophen, albumin human, aluminum-magnesium hydroxide-simethicone, bisacodyl, bisacodyl, dextrose 50 % (D50W), glucagon (human recombinant), magnesium hydroxide, naloxone **OR** naloxone, ondansetron, sodium chloride, sodium chloride, sodium phosphates 133 mL, traMADol, traZODone

## 2021-06-27 NOTE — PROGRESS NOTES
Progress Notes by Ashlee Trotter MD at 1/16/2019 10:12 AM     Author: Ashlee Trotter MD Service: Family Medicine Author Type: Resident    Filed: 1/16/2019 11:31 AM Date of Service: 1/16/2019 10:12 AM Status: Attested    : Ashlee Trotter MD (Resident) Cosigner: Jameson Waller MD at 1/16/2019  3:40 PM    Attestation signed by Jameson Waller MD at 1/16/2019  3:40 PM    [facility] Faculty    Main Carney was seen and examined by me on 1/16/2019.    My exam confirms the findings exam reflected in the note(s) by Dr. Trotter .    The note has been reviewed by me and it reflects the assessment and plan we discussed.     Jameson Aiken MD                  [facility] Medicine Daily Progress Note    Assessment & Plan: Main Carney is a 63 y.o. male with a past medical history of HTN, unstable angina admitted for severe multivessal coronary artery disease POD#5 s/p CABGx5.     Principal Problem:    Angina, class III (H)  Active Problems:    Unstable angina (H)    Hyperlipidemia LDL goal <70    S/P CABG (coronary artery bypass graft)    On mechanically assisted ventilation (H)    Severe multivessel CAD  Unstable angina  POD#5 s/p CABG x 5   History of progressive angina with dyspnea on exertion. EKG showed first degree AV block, RBBB and Q waves in the inferior leads. 1/10/19 cardiac cath with severe multivessel disease. ECHO shows normal EF of 63% with no WMA or valvular disease. Carotid US without significant stenosis. Now postop day 5 from CABG.  Patient was extubated successfully 1/11 without any issue after CABG procedure. ICU signed off on 1/12/19. Has had mild incisional pain, however, incision healing well. Saturating well on room air. Able to participate in PT with improved pain in right ankle.   - ASA 81mg daily  - Atorvastatin 40 mg  - Carvedilol 12.5 mg two times a day  - Lasix 40mg PO two times a day per CV surgery  - SSI per CV surgery  - Encourage incentive spirometry  - CV surgery and cardiology  following  - Per CV surgery recommendations, likely discharge in 24-48 hours.     Acute gout flare: Has a history of gout and reports that symptoms are similar to past gout flares.  Severe right ankle pain with painful range of motion on exam. Does feel generalized weakness. No erythema or warmth to the area. Likely, this is an acute gout flare.   - Colchicine 0.6mg two times daily   - Continure oral prednisone 40mg daily    Leukocytosis: WBC decreased to 16.6 from 19.6. May be 2/2 to post surgery and acute gout flare. Has been without fevers since admission and post operatively. No evidence of infection on exam.  - AM CBC  - Monitor for fevers.    HTN: Blood pressures have been borderline elevated.  - Metoprolol tartrate 25mg twice daily    Anemia  Patient came in with hgb of 14 on admission, 8.0 today on recheck. Presume this is related to blood loss in surgery and chest tubes output. WIll need continued monitoring of this. No other signs of bleeding  - Goal Hgb >8 as it is a cardiac pt in setting of recent surgery  - Ferrous sulfate 325mg two times a day with meals    Diet: Cardiac diet  Fluids: No fluids   VTE Prophylaxis: Heparin 5000 units every 8 hours.     Discharge Planning discussed with patient and patient's wife  Barriers to discharge: Medical  Anticipated discharge day: Awaiting CT surgery recs.   Disposition:  Home  Status: inpatient .admit  Length of Stay: 6     Patient discussed with attending [facility] physician, Dr. Jameson Aiken, who agrees with the plan.     Ashlee Trotter PGY1 1/16/2019  [facility] Medicine   Pager: [phone] (from 8AM-5:30PM)    This is a [facility] Patient.  Please call the senior pager with any questions or concerns, 24/7.  [phone]    Subjective/Interval events:  He reports improved pain in his right ankle since yesterday.  He reports that he has been able to bear more weight on his right foot, still experiencing a 5/10 on the pain  scale while walking.  This is much improved since yesterday.  He was able to participate in PT.  Feels frustrated at his ankle and gout flare, which is making it difficult for him to exercise and improve after surgery.  He appears more alert today.  He reports that he is not as tired today.  Is more conversational today in room.  He has mild incisional pain. He denies any chest pain, incisional pain, shortness of breath, numbness, tingling, weakness, nausea/vomiting.  Has had no symptoms of presyncope.     ROS: Negative unless noted above.    Objective  Vital signs in last 24 hours  Temp:  [98.1  F (36.7  C)-98.9  F (37.2  C)] 98.1  F (36.7  C)  Heart Rate:  [60-67] 61  Resp:  [16-18] 17  BP: (100-129)/(56-69) 129/56  142 lb 12.8 oz (64.8 kg)  Physical Exam  General appearance: alert, appears stated age, cooperative and no distress  Head: Normocephalic, without obvious abnormality, atraumatic  Eyes: conjunctivae/corneas clear.  EOM's intact.  Lungs: clear to auscultation bilaterally  Heart: regular rate and rhythm, S1, S2 normal, no murmur, click, rub or gallop   Chest: Well-healing sternotomy without any drainage or spreading erythema. Mild tenderness to incision site. Chest tubes discontinued incisions well healing.  Abdomen: soft, non-tender; bowel sounds normal; no masses,  no organomegaly  Extremities: Minimal right ankle tenderness to palpation, no warmth, no erythema noted, no pain with dorsiflexion/plantarflexion.    Pulses: 2+ and symmetric  Skin: No evidence of cyanosis. No other lesions other than surgical sites on lower extremities and anterior chest wall.     Intake/Output last 3 shifts  I/O last 3 completed shifts:  In: 380 [P.O.:360; I.V.:20]  Out: -   Pertinent Labs   Results from last 7 days   Lab Units 01/16/19  0509 01/15/19  0551 01/14/19  0536   LN-WHITE BLOOD CELL COUNT thou/uL 16.6* 17.0* 19.6*   LN-HEMOGLOBIN g/dL 8.0* 8.3* 8.9*   LN-HEMATOCRIT % 24.0* 25.4* 26.2*   LN-PLATELET COUNT thou/uL  260 252 221     Results from last 7 days   Lab Units 01/16/19  0509 01/15/19  0551 01/14/19  1941 01/14/19  0536 01/13/19  0525   LN-SODIUM mmol/L  --  136  --  134* 140   LN-POTASSIUM mmol/L 4.2 4.5 4.2 3.3* 3.9   LN-CHLORIDE mmol/L  --  101  --  99 106   LN-CO2 mmol/L  --  29  --  28 24   LN-BLOOD UREA NITROGEN mg/dL  --  16  --  17 21   LN-CREATININE mg/dL  --  0.73  --  0.74 0.77   LN-CALCIUM mg/dL  --  8.4*  --  8.4* 8.7     Results from last 7 days   Lab Units 01/12/19  0339 01/11/19  1733 01/11/19  1620   LN-INR  1.35* 1.48* 1.54*   LN-PARTIAL THROMBOPLASTIN TIME seconds  --   --  71*     Pertinent Radiology    Xr Chest 2 Views    Result Date: 1/10/2019  XR CHEST 2 VIEWS 1/10/2019 4:34 PM INDICATION: Pre op cabg COMPARISON: Chest x-ray 12/26/2018 FINDINGS: No pneumothorax. Mild pulmonary hyperinflation. The lungs are clear and there are no pleural effusions. Stable upper normal heart size. No overt vascular congestion.    Xr Chest 2 Views    Result Date: 12/26/2018  XR CHEST 2 VIEWS 12/26/2018 2:45 PM INDICATION: Sob, harmon COMPARISON: None. FINDINGS: Trace right pleural effusion or pleural scar. Heart size appears normal. Lungs appear clear.    Us Carotid Bilateral    Result Date: 1/10/2019  US CAROTID BILATERAL 1/10/2019 4:31 PM INDICATION: Pre op evaluation. TECHNIQUE: Duplex exam performed utilizing 2D gray-scale imaging, Doppler interrogation with color-flow and spectral waveform analysis. COMPARISON: None. FINDINGS: RIGHT: There is a moderate amount of atheromatous plaque. Normal waveforms with no significant stenosis. LEFT: There is a moderate amount of atheromatous plaque. Normal waveforms with no significant stenosis. Both vertebral arteries and subclavian artery waveforms are normal. VELOCITY CHART: The following velocities were obtained in the RIGHT carotid system. CCA: 71/12 cm/s ICA: 79/25 cm/s ECA: 106/7 cm/s ICA/CCA: PS 1.11 The following velocities were obtained in the LEFT carotid system. CCA:  75/26 cm/s ICA: 87/31 cm/s ECA: 96/16 cm/s ICA/CCA: PS 1.16                            CONCLUSION: 1.  Mild atheromatous plaque in the carotid arteries. 2.  No significant stenosis on either side. Evaluation based on velocities and NASCET criteria.    Current Medications.   ? acetaminophen  650 mg Oral Q6H    Or   ? acetaminophen  650 mg Rectal Q6H   ? ascorbic acid (vitamin C)  500 mg Oral Daily with lunch   ? aspirin  81 mg Oral DAILY   ? atorvastatin  80 mg Oral QHS   ? bisacodyl  10 mg Rectal Once   ? carvedilol  12.5 mg Oral BID with meals   ? colchicine  0.6 mg Oral BID   ? docusate sodium  100 mg Oral BID   ? ferrous sulfate  325 mg Oral BID with meals   ? furosemide  40 mg Oral BID - diuretic   ? heparin (PF)  5,000 Units Subcutaneous Q8H FIXED TIMES   ? insulin aspart (NovoLOG) injection   Subcutaneous TID with meals   ? insulin aspart (NovoLOG) injection   Subcutaneous QHS   ? lidocaine  1 patch Transdermal DAILY   ? melatonin  3 mg Oral QHS   ? omeprazole  20 mg Oral QAM AC   ? polyethylene glycol  17 g Oral DAILY   ? potassium chloride ER  20 mEq Oral BID   ? predniSONE  40 mg Oral DAILY   ? sodium chloride bacteriostatic  1-5 mL Intradermal Once   ? sodium chloride  10 mL Intravenous Q8H FIXED TIMES     PRN:acetaminophen, acetaminophen, albumin human, aluminum-magnesium hydroxide-simethicone, bisacodyl, bisacodyl, dextrose 50 % (D50W), glucagon (human recombinant), magnesium hydroxide, naloxone **OR** naloxone, ondansetron, sodium chloride, sodium chloride, sodium phosphates 133 mL, traMADol, traZODone    Ashlee Trotter MD  PGY-1, [facility]

## 2021-06-27 NOTE — PROGRESS NOTES
Progress Notes by Torsten Herzog MD at 1/3/2019 11:20 AM     Author: Torsten Herzog MD Service: -- Author Type: Physician    Filed: 1/3/2019 12:08 PM Encounter Date: 1/3/2019 Status: Signed    : Torsten Herzog MD (Physician)           Click to link to [facility] Heart Care     [facility] HEART CARE NOTE    Thank you, Dr. Provider, [facility], for asking the [facility] Heart Care team to see Mr. Main Carney to evaluate Consult chest pain.      Assessment/Recommendations   Assessment:    1. Progressive exertional angina, class IIIb - this is slowly progressive over the course of a year but is fairly limiting in his functional capacity at this time. His symptoms are severe enough and typical enough that proceeding directly to invasive angiography with possible intervention is the best course of action.  2. hypertension - BP elevated today. Not taking any prescription medications for this.    Plan:  1. Continue aspirin  2. Start carvedilol 12.5 mg two times a day  3. Start atorvastatin 40 mg daily  4. Echocardiogram  5. Coronary angiogram with possible PCI. Discussed with the patient the risks and benefits of left heat catheterization with coronary angiogram including possible PCI. The risks include but are not limited to death, myocardial infarction, stroke, kidney dysfunction, vessel trauma, hemorrhage, need for emergency corrective surgery, allergy, and dysrhythmia.  6. Fasting lipid panel morning of angiogram.  7. Follow up with me in 4-6 weeks         History of Present Illness   Mr. Main Carney is a 63 y.o. male with a significant past history of hypertension who presents for urgent evaluation after an ER visit for chest pain.     Mr. Carney is Hmong speaking and presents today with his son and a professional . Mr. Carney has been having exertional dyspnea for about a year. It has been progressive in nature over the past year and now occurs with climbing less than one flight of  stairs. It is now also associated with chest pain and diaphoresis. He has not yet had any rest symptoms but does require several minutes of rest to recover. He works as a  in a school and is able to work at a slow enough pace to avoid significant symptoms.      Other than noted above, Mr. Carney denies any chest pain/pressure/tightness, shortness of breath at rest or with exertion, light headedness/dizziness, pre-syncope, syncope, lower extremity swelling, palpitations, paroxysmal nocturnal dyspnea (PND), or orthopnea.     Cardiac Problems and Cardiac Diagnostics     Most Recent Cardiac testing:  ECG dated 12/31/18 (personaly reviewed and interpreted): sinus rhythm with first degree AV block, RBBB, old inferior infarct. No acute ST/T wave changes.    ECHO (report reviewed): no prior echo.  Echo results:           Medications  Allergies   Current Outpatient Medications   Medication Sig Dispense Refill   ? acetaminophen (TYLENOL) 325 MG tablet Take 650 mg by mouth every 6 (six) hours as needed for pain.     ? aspirin 81 mg chewable tablet Chew 1 tablet (81 mg total) daily. 30 tablet 0   ? atorvastatin (LIPITOR) 40 MG tablet Take 1 tablet (40 mg total) by mouth at bedtime. 30 tablet 5   ? carvedilol (COREG) 12.5 MG tablet Take 1 tablet (12.5 mg total) by mouth 2 (two) times a day with meals. 60 tablet 5     No current facility-administered medications for this visit.       No Known Allergies     Physical Examination Review of Systems   Vitals:    01/03/19 1114   BP: 164/88   Pulse: 64   Resp: 16     Body mass index is 29.49 kg/m .  Wt Readings from Last 3 Encounters:   01/03/19 146 lb (66.2 kg)   12/26/18 145 lb (65.8 kg)       General Appearance:   Pleasant  male, appears stated age. no acute distress, normal body habitus   ENT/Mouth: membranes moist, no apparent gingival bleeding.      EYES:  no scleral icterus, normal conjunctivae   Neck: no carotid bruits. No anterior cervical lymphadenopaty   Respiratory:    lungs are clear to auscultation, no rales or wheezing, equal chest wall expansion    Cardiovascular:   Regular rhythm, normal rate. Normal first and second heart sounds with no murmurs, rubs, or gallops; the carotid, radial and posterior tibial pulses are intact, Jugular venous pressure normal, no edema bilaterally    Abdomen/GI:  no organomegaly, masses, bruits, or tenderness; bowel sounds are present   Extremities: no cyanosis or clubbing   Skin: no xanthelasma, warm.    Heme/lymph/ Immunology No apparent bleeding noted.   Neurologic: Alert and oriented. normal gait, no tremors     Psychiatric: Pleasant, calm, appropriate affect.    A complete 10 system review of systems was performed and is negative except as mentioned in the HPI or below:  General: Fever  Eyes: WNL  Ears/Nose/Throat: WNL  Lungs: Cough, Shortness of Breath, Snoring  Heart: Shortness of Breath with activity  Stomach: WNL  Bladder: WNL  Muscle/Joints: WNL  Skin: WNL  Nervous System: Daytime Sleepiness  Mental Health: WNL     Blood: WNL       Past History   Past Medical History:   Past Medical History:   Diagnosis Date   ? HTN (hypertension)        Past Surgical History:   Past Surgical History:   Procedure Laterality Date   ? APPENDECTOMY         Family History:   Family History   Problem Relation Age of Onset   ? No Medical Problems Mother    ? No Medical Problems Father    ? Heart disease Neg Hx         Social History:   Social History     Socioeconomic History   ? Marital status:      Spouse name: Not on file   ? Number of children: Not on file   ? Years of education: Not on file   ? Highest education level: Not on file   Social Needs   ? Financial resource strain: Not on file   ? Food insecurity - worry: Not on file   ? Food insecurity - inability: Not on file   ? Transportation needs - medical: Not on file   ? Transportation needs - non-medical: Not on file   Occupational History   ? Not on file   Tobacco Use   ? Smoking status: Never  Smoker   ? Smokeless tobacco: Never Used   Substance and Sexual Activity   ? Alcohol use: Not on file   ? Drug use: Not on file   ? Sexual activity: Not on file   Other Topics Concern   ? Not on file   Social History Narrative   ? Not on file              Lab Results    Chemistry/lipid CBC Cardiac Enzymes/BNP/TSH/INR   Lab Results   Component Value Date    CREATININE 0.76 12/26/2018    BUN 15 12/26/2018    K 4.0 12/26/2018     12/26/2018     12/26/2018    CO2 26 12/26/2018    Lab Results   Component Value Date    WBC 10.4 12/26/2018    HGB 14.5 12/26/2018    HCT 41.3 12/26/2018    MCV 91 12/26/2018     12/26/2018    Lab Results   Component Value Date    TROPONINI 0.02 12/26/2018    BNP 73 (H) 12/26/2018          Torsten Herzog MD  Non-invasive Cardiology  Critical access hospital

## 2021-06-27 NOTE — PROGRESS NOTES
Progress Notes by Misty Law MD at 2019  8:35 AM     Author: Misty Law MD Service: Cardiology Author Type: Physician    Filed: 2019  8:44 AM Date of Service: 2019  8:35 AM Status: Signed    : Misty Law MD (Physician)           CARDIOLOGY PROGRESS NOTE      Assessment/Plan:  1.  SVT-appears to be very short bursts of atrial fibrillation or atrial tachycardia which has resolved with potassium supplementation and low-dose beta-blocker.  2.  Bradycardia- resolved with potassium supplement and low-dose beta-blocker and no need for pacemaker and will continue current medications.  3.  Angina, class III (H)-this prompted coronary artery angiography and bypass.    Resolved at this point time.  4.  Coronary artery disease- angiography 2 weeks ago showed left main distal 40% stenosis, proximal LAD 50% followed by 70% lesion, mid 80% with distal 70% lesion, second diagonal 70% stenosis and in third diagonal 50% stenosis.  Circumflex had a mid 70 with distal 70% stenosis and the second obtuse marginal had a 50% stenosis, right coronary artery had an ostial 80% followed with proximal 90% followed by mid 80% stenosis with the ostial right PDA 50% stenosis followed by a proximal 90% lesion.  This prompted coronary artery bypass grafting.  5.  S/P CABG (coronary artery bypass graft)- he had a LIMA to the LAD, vein graft to the diagonal 2, vein graft to the obtuse marginal artery 2, and a sequential vein graft to the proximal PDA and mid right PDA.  6.  Volume retention-IV diuretic.  Weight down 5 pounds, oxygen saturation 95% on room air, creatinine stable, so we will continue IV diuretic today.  7.  Anemia-hemoglobin 8.9 which is slowly increasing.  8.  Hyperlipidemia LDL goal <70-LDL excellent at 53.      Plan:  1.  Continue IV diuretic.    Discharge Plannin.  Potential discharge home today or tomorrow.  2.  Can follow with Dr. Torsten Herzog upon discharge.     LOS: 8  days     Subjective:  Interval History:    63-year-old Laotian gentleman being seen on ninth day of hospitalization.   is present.  He feels well with no palpitations, skipped beats, diarrhea, chest discomfort, shortness breath, PND, orthopnea, syncope, dizziness or peripheral edema.    Medications  ? acetaminophen  650 mg Oral Q6H    Or   ? acetaminophen  650 mg Rectal Q6H   ? ascorbic acid (vitamin C)  500 mg Oral Daily with lunch   ? aspirin  81 mg Oral DAILY   ? atorvastatin  80 mg Oral QHS   ? atropine  1 mg Intravenous Once   ? bisacodyl  10 mg Rectal Once   ? clopidogrel  75 mg Oral DAILY   ? colchicine  0.6 mg Oral BID   ? docusate sodium  100 mg Oral BID   ? ferrous sulfate  325 mg Oral BID with meals   ? furosemide  40 mg Intravenous BID - diuretic   ? heparin (PF)  5,000 Units Subcutaneous Q8H FIXED TIMES   ? insulin aspart (NovoLOG) injection   Subcutaneous TID with meals   ? insulin aspart (NovoLOG) injection   Subcutaneous QHS   ? lidocaine  1 patch Transdermal DAILY   ? melatonin  3 mg Oral QHS   ? metoprolol tartrate  12.5 mg Oral BID   ? omeprazole  20 mg Oral QAM AC   ? polyethylene glycol  17 g Oral DAILY   ? potassium chloride ER  20 mEq Oral BID   ? predniSONE  20 mg Oral Daily with brkfst    Followed by   ? [START ON 1/21/2019] predniSONE  15 mg Oral Daily with brkfst    Followed by   ? [START ON 1/24/2019] predniSONE  10 mg Oral Daily with brkfst    Followed by   ? [START ON 1/27/2019] predniSONE  5 mg Oral Daily with brkfst   ? predniSONE  40 mg Oral DAILY   ? sodium chloride bacteriostatic  1-5 mL Intradermal Once   ? sodium chloride  10 mL Intravenous Q8H FIXED TIMES     Objective:   Vital signs in last 24 hours:  Temp:  [97.5  F (36.4  C)-98.7  F (37.1  C)] 98.3  F (36.8  C)  Heart Rate:  [39-68] 62  Resp:  [16-20] 18  BP: ()/(58-71) 116/65  Weight:   141 lb 11.2 oz (64.3 kg)        Physical Exam:  /65 (Patient Position: Semi-vogt)   Pulse 62   Temp 98.3  F (36.8  " C) (Oral)   Resp 18   Ht 5' 1\" (1.549 m)   Wt 141 lb 11.2 oz (64.3 kg)   SpO2 95%   BMI 26.77 kg/m      General Appearance:    Alert, cooperative, no distress, appears stated age   Head:    Normocephalic, without obvious abnormality, atraumatic   Throat:   Lips, mucosa, and tongue normal; teeth and gums normal   Neck:   Supple, symmetrical, trachea midline, no adenopathy;        thyroid:  No enlargement/tenderness/nodules; no carotid    bruit, 2-3 cm at 30 degrees JVD   Back:     Symmetric, no curvature, ROM normal, no CVA tenderness   Lungs:     Clear to auscultation bilaterally, respirations unlabored   Chest wall:    No tenderness, midline sternotomy scar   Heart:    Regular rate and rhythm, S1 and S2 normal, no murmur, rub   or gallop   Abdomen:     Soft, non-tender, bowel sounds active all four quadrants,     no masses, no organomegaly   Extremities:   Normal, atraumatic, no cyanosis or edema   Pulses:   2+ and symmetric all extremities   Skin:   Skin color, texture, turgor normal, no rashes or lesions     Cardiographics:      ECG: Personally reviewed by myself shows sinus rhythm, leftward axis, right bundle branch block pattern, no acute changes.  Ectopy resolved.      Echocardiogram:     Left ventricle ejection fraction is normal. The calculated left ventricular ejection fraction is 63%.    Normal right ventricular size and systolic function.    No hemodynamically significant valvular heart abnormalities.    No previous study for comparison.     Lab Results:   Results from last 7 days   Lab Units 01/18/19  0524   LN-WHITE BLOOD CELL COUNT thou/uL 25.7*   LN-HEMOGLOBIN g/dL 8.9*   LN-HEMATOCRIT % 27.4*   LN-PLATELET COUNT thou/uL 407     Results from last 7 days   Lab Units 01/18/19  0524   LN-SODIUM mmol/L 138   LN-POTASSIUM mmol/L 4.2   LN-CHLORIDE mmol/L 103   LN-CO2 mmol/L 27   LN-BLOOD UREA NITROGEN mg/dL 18   LN-CREATININE mg/dL 0.74   LN-CALCIUM mg/dL 9.0   .       Lab Results   Component Value " Date    TROPONINI 0.02 12/26/2018

## 2021-07-01 NOTE — CONSULTS
Consults by Misty Law MD at 1/17/2019  4:08 PM     Author: Misty Law MD Service: Cardiology Author Type: Physician    Filed: 1/17/2019  4:16 PM Date of Service: 1/17/2019  4:08 PM Status: Signed    : Mitsy Law MD (Physician)     Consult Orders    1. Inpatient consult to Cardiology Reason for Consult? Rhythm -; Consult priority: Today (routine); Communication for MD: No phone communication necessary for now [[id number]] ordered by Mary Ann Quintana CNP at 01/17/19 4462                 CARDIOLOGY CONSULT NOTE         Assessment:   1.  SVT-appears to be very short bursts of atrial fibrillation or atrial tachycardia.  At this point time try low-dose of beta-blocker.  Suspect intensified by low potassium.  2.  Bradycardia- low-dose beta-blocker and be careful not to cause too much bradycardia.  3.  Angina, class III (H)-this prompted coronary artery angiography and bypass.  Stable at this point time.  4.  Coronary artery disease- angiography 2 weeks ago showed left main distal 40% stenosis, proximal LAD 50% followed by 70% lesion, mid 80% with distal 70% lesion, second diagonal 70% stenosis in third diagonal 50% stenosis.  Circumflex had a mid 70 with distal 70% stenosis in the second obtuse marginal had a 50% stenosis, right coronary artery had an ostial 80% followed with proximal 90% followed by mid 80% stenosis with the ostial right PDA 50% stenosis followed by a proximal 90% lesion.  This prompted coronary artery bypass grafting.  5.  S/P CABG (coronary artery bypass graft)-January 12 he had a LIMA to the LAD, vein graft to the diagonal 2, vein graft to the obtuse marginal artery 2, and a sequential vein graft to the proximal PDA and mid right PDA.  6.  Volume retention-IV diuretic.     Plan:   1.  Give 1 dose of Imodium.  2.  Try some low-dose metoprolol.  3.  Give 1 extra dose of potassium.  4.  Switch diuretic over to oral given appearance of heart failure.     Current History:    [facility] has been requested by Mary Ann Quintana to evaluate Main Carney  who is a 63 y.o. year old ong male for bradycardia and palpitations  Patient had increased amounts of chest discomfort and presented to [facility] where he was referred for coronary angiography.  Angiography showed significant three-vessel disease prompting coronary artery bypass grafting.  Upon preparation for discharge home today following surgery patient developed some incisional discomfort followed by palpitations.  He was a little short of breath and does complain of diarrhea 3-4 times today.  There is no PND, orthopnea, syncope or dizziness.  Bradycardia with SVT was noted and cardiology consultation is requested.   is present for my history.    Past Medical History:     Active Ambulatory (Non-Hospital) Problems    Diagnosis   ? HTN (hypertension)     Past history is negative for cancer, tuberculosis, diabetes mellitus, myocardial infarction,  rheumatic fever, cerebrovascular accident, chronic kidney disease, peptic ulcer disease, chronic obstructive pulmonary disease, or thyroid disorder  and no lipid disorder.    Past Surgical History:     Past Surgical History:   Procedure Laterality Date   ? APPENDECTOMY     ? CV CORONARY ANGIOGRAM N/A 1/10/2019    Procedure: Coronary Angiogram;  Surgeon: Glory Carl MD;  Location: [facility];  Service: Cardiology   ? CV LEFT HEART CATHETERIZATION WO LEFT VETRICULOGRAM Left 1/10/2019    Procedure: Left Heart Catheterization Without Left Ventriculogram;  Surgeon: Glory Carl MD;  Location: [facility];  Service: Cardiology   ? PICC AND MIDLINE TEAM LINE INSERTION  1/12/2019          Family History:   Reviewed, and negative for coronary artery disease in the family.    Social History:   Reviewed, and he  reports that  has never smoked. he has never used smokeless tobacco. He reports that he does not use drugs.  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    Meds:     ? acetaminophen  650 mg Oral Q6H    Or   ? acetaminophen  650 mg Rectal Q6H   ? ascorbic acid (vitamin C)  500 mg Oral Daily with lunch   ? aspirin  81 mg Oral DAILY   ? atorvastatin  80 mg Oral QHS   ? atropine  1 mg Intravenous Once   ? bisacodyl  10 mg Rectal Once   ? clopidogrel  75 mg Oral DAILY   ? colchicine  0.6 mg Oral BID   ? docusate sodium  100 mg Oral BID   ? ferrous sulfate  325 mg Oral BID with meals   ? furosemide  40 mg Oral BID - diuretic   ? heparin (PF)  5,000 Units Subcutaneous Q8H FIXED TIMES   ? insulin aspart (NovoLOG) injection   Subcutaneous TID with meals   ? insulin aspart (NovoLOG) injection   Subcutaneous QHS   ? lidocaine  1 patch Transdermal DAILY   ? melatonin  3 mg Oral QHS   ? omeprazole  20 mg Oral QAM AC   ? polyethylene glycol  17 g Oral DAILY   ? potassium chloride ER  20 mEq Oral BID   ? [START ON 1/18/2019] predniSONE  20 mg Oral Daily with brkfst    Followed by   ? [START ON 1/21/2019] predniSONE  15 mg Oral Daily with brkfst    Followed by   ? [START ON 1/24/2019] predniSONE  10 mg Oral Daily with brkfst    Followed by   ? [START ON 1/27/2019] predniSONE  5 mg Oral Daily with brkfst   ? predniSONE  40 mg Oral DAILY   ? sodium chloride bacteriostatic  1-5 mL Intradermal Once   ? sodium chloride  10 mL Intravenous Q8H FIXED TIMES     Allergies:   Patient has no known allergies.    Review of Systems:   A 12 point comprehensive review of systems was  as follows:   General: Positive for fatigue, negative weight loss or weight gain  Constitutional: negative for anorexia, chills, fevers, malaise, night sweats   Eyes: negative for cataracts, color blindness, contacts/glasses, glaucoma, icterus, irritation, redness and visual disturbance  Ears, nose, mouth, throat, and face: negative for ear drainage, earaches, epistaxis, facial trauma, hearing loss, hoarseness, nasal congestion,  "snoring, sore mouth, sore throat, tinnitus and voice change  Respiratory: negative for cough, dyspnea on exertion,  hemoptysis, sputum production, pleurisy, stridor, wheezing, PND, orthopnea  Cardiovascular: Positive palpitations, negative for chest pain, chest pressure/discomfort, claudication, dyspnea, exertional chest pressure/discomfort, fatigue, lower extremity edema, near-syncope,   syncope   Gastrointestinal: negative for abdominal pain, change in bowel haibits, constipation, diarrhea, dyspepsia, dysphagia, jaundice, melena, nausea, odynophagia, reflux symptoms and vomiting  Genitourinary: negative for decreased stream  Hematologic/lymphatic: negative for bleeding  Musculoskeletal: negative for arthralgias, back pain, bone pain, muscle weakness, myalgias, neck pain and stiff joints  Neurological: negative for syncope, presyncope, coordination problems, dizziness, gait problems, headaches, memory problems, paresthesia, seizures, speech problems, tremors, vertigo and weakness    Objective:     Physical Exam:  /70 (Patient Position: Semi-vogt)   Pulse (!) 50   Temp 97.5  F (36.4  C) (Oral)   Resp 18   Ht 5' 1\" (1.549 m)   Wt 144 lb 14.4 oz (65.7 kg)   SpO2 100%   BMI 27.38 kg/m       Head:    Normocephalic, without obvious abnormality, atraumatic   Eyes:    PERRL, conjunctiva/corneas clear, EOM's intact,           Nose:   Nares normal, septum midline, mucosa normal, no drainage  or sinus tenderness   Throat:   Lips, mucosa, and tongue normal; teeth and gums normal   Neck:   Supple, symmetrical, trachea midline, no adenopathy;        thyroid:  No enlargement/tenderness/nodules; no carotid    bruit or JVD   Back:     Symmetric, no curvature, ROM normal, no CVA tenderness   Lungs:    Diminished left base respirations unlabored   Chest wall:    No tenderness, midline sternotomy scar   Heart:    Regular rate and rhythm, S1 and S2 normal, no murmur, rub   or gallop   Abdomen:     Soft, non-tender, bowel " sounds active all four quadrants,     no masses, no organomegaly   Extremities:   Extremities normal, atraumatic, no cyanosis or edema   Pulses:   2+ and symmetric all extremities   Skin:   Skin color, texture, turgor normal, no rashes or lesions   Neurologic:   CNII-XII intact. Normal strength, sensation and reflexes       throughout     Cardiographics and Imaging  Radiology Results: Chest x-ray shows Cardiomegaly. There is mild improvement of bilateral lung aeration. There is mild persistent interstitial prominence. No pleural effusion or pneumothorax. Postoperative changes of CABG.    EKG Results: personally reviewed sinus bradycardia, incomplete right bundle branch block, possible old inferior Q wave MI type pattern, brief bursts of SVT noted.    Lab Review   Pertinent Labs  Lab Results: personally reviewed       Lab Results   Component Value Date    TROPONINI 0.02 12/26/2018       Lab Results   Component Value Date    CREATININE 0.73 01/15/2019    BUN 16 01/15/2019     01/15/2019    K 3.8 01/17/2019     01/15/2019    CO2 29 01/15/2019       Lab Results   Component Value Date    WBC 16.6 (H) 01/16/2019    HGB 8.0 (L) 01/16/2019    HCT 24.0 (L) 01/16/2019    MCV 93 01/16/2019     01/17/2019       Lab Results   Component Value Date    BNP 73 (H) 12/26/2018

## 2021-07-01 NOTE — CONSULTS
Consults by Misty Law MD at 1/19/2019 10:06 AM     Author: Misty Law MD Service: Cardiology Author Type: Physician    Filed: 1/19/2019 10:12 AM Date of Service: 1/19/2019 10:06 AM Status: Signed    : Misty Law MD (Physician)     Consult Orders    1. Inpatient consult to Cardiology Reason for Consult? re consult. tachy/loren in last 24h. PPM?; Consult priority: Today (routine); Communication for MD: No phone communication necessary for now [[id number]] ordered by Teresa Sebastian PA-C at 01/19/19 0957                 CARDIOLOGY PROGRESS NOTE      Assessment/Plan:  1.  SVT-appears to be very short bursts of atrial fibrillation or atrial tachycardia which has lessened with potassium supplementation and low-dose beta-blocker.  2.  Bradycardia-  asymptomatic, probably while patient was sleeping at 5 AM.  Given asymptomatic nature at this point time do not feel that permanent programmable pacer is needed.  I do agree he does have sick sinus syndrome/tachybradycardia syndrome and hopefully will lessen over time with the addition of beta-blocker.  Should he develop symptomatic profound bradycardia and certainly would need pacer at that point.    3.  Angina, class III (H)-this prompted coronary artery angiography and bypass.    Resolved at this point time.  4.  Coronary artery disease- angiography 2 weeks ago showed left main distal 40% stenosis, proximal LAD 50% followed by 70% lesion, mid 80% with distal 70% lesion, second diagonal 70% stenosis and in third diagonal 50% stenosis.  Circumflex had a mid 70 with distal 70% stenosis and the second obtuse marginal had a 50% stenosis, right coronary artery had an ostial 80% followed with proximal 90% followed by mid 80% stenosis with the ostial right PDA 50% stenosis followed by a proximal 90% lesion.  This prompted coronary artery bypass grafting.  5.  S/P CABG (coronary artery bypass graft)-January 12 he had a LIMA to the LAD, vein graft to the  diagonal 2, vein graft to the obtuse marginal artery 2, and a sequential vein graft to the proximal PDA and mid right PDA.  Doing well postoperatively with cardiac rehab.  6.  Volume retention-IV diuretic.  Weight down 11 pounds, oxygen saturation 99% on room air, creatinine stable, symptoms resolved and oral diuretic today.  7.  Anemia-hemoglobin 8.9 which is slowly increasing.  8.  Hyperlipidemia LDL goal <70-LDL excellent at 53.    Plan:  1.  Switch to oral diuretic.  2.  No pacer at this time, could discharge patient home.    Discharge Plannin.  Home today per surgery.  2.  Follow-up closely for symptoms of bradycardia.  3.  Can follow with Dr. Torsten Herzog primary cardiologist.     LOS: 9 days     Subjective:  Interval History:    63-year-old [place] gentleman being seen on 10th day of hospitalization.   as well as wife are present.  He feels well without any fatigue, syncope, dizziness, chest discomfort, palpitations, shortness of breath, PND, orthopnea, peripheral edema, nausea, vomiting or bowel issues.    Medications  ? acetaminophen  650 mg Oral Q6H    Or   ? acetaminophen  650 mg Rectal Q6H   ? ascorbic acid (vitamin C)  500 mg Oral Daily with lunch   ? aspirin  81 mg Oral DAILY   ? atorvastatin  80 mg Oral QHS   ? atropine  1 mg Intravenous Once   ? bisacodyl  10 mg Rectal Once   ? clopidogrel  75 mg Oral DAILY   ? colchicine  0.6 mg Oral BID   ? docusate sodium  100 mg Oral BID   ? ferrous sulfate  325 mg Oral BID with meals   ? furosemide  40 mg Intravenous BID - diuretic   ? heparin (PF)  5,000 Units Subcutaneous Q8H FIXED TIMES   ? insulin aspart (NovoLOG) injection   Subcutaneous TID with meals   ? insulin aspart (NovoLOG) injection   Subcutaneous QHS   ? lidocaine  1 patch Transdermal DAILY   ? melatonin  3 mg Oral QHS   ? metoprolol tartrate  12.5 mg Oral BID   ? omeprazole  20 mg Oral QAM AC   ? polyethylene glycol  17 g Oral DAILY   ? potassium chloride ER  20 mEq Oral BID   ?  "predniSONE  20 mg Oral Daily with brkfst    Followed by   ? [START ON 1/21/2019] predniSONE  15 mg Oral Daily with brkfst    Followed by   ? [START ON 1/24/2019] predniSONE  10 mg Oral Daily with brkfst    Followed by   ? [START ON 1/27/2019] predniSONE  5 mg Oral Daily with brkfst   ? predniSONE  40 mg Oral DAILY   ? sodium chloride bacteriostatic  1-5 mL Intradermal Once   ? sodium chloride  10 mL Intravenous Q8H FIXED TIMES     Objective:   Vital signs in last 24 hours:  Temp:  [97.7  F (36.5  C)-98.5  F (36.9  C)] 97.8  F (36.6  C)  Heart Rate:  [60-85] 61  Resp:  [16-20] 16  BP: ()/(56-73) 136/73  Weight:   139 lb (63 kg)        Physical Exam:  /73 (Patient Position: Sitting)   Pulse 61   Temp 97.8  F (36.6  C) (Oral)   Resp 16   Ht 5' 1\" (1.549 m)   Wt 139 lb (63 kg)   SpO2 99%   BMI 26.26 kg/m      General Appearance:    Alert, cooperative, no distress, appears stated age   Head:    Normocephalic, without obvious abnormality, atraumatic   Throat:   Lips, mucosa, and tongue normal; teeth and gums normal   Neck:   Supple, symmetrical, trachea midline, no adenopathy;        thyroid:  No enlargement/tenderness/nodules; no carotid    bruit or JVD   Back:     Symmetric, no curvature, ROM normal, no CVA tenderness   Lungs:     Clear to auscultation bilaterally, respirations unlabored   Chest wall:    No tenderness, midline sternotomy scar   Heart:    Regular rate and rhythm, S1 and S2 normal, no murmur, rub   or gallop   Abdomen:     Soft, non-tender, bowel sounds active all four quadrants,     no masses, no organomegaly   Extremities:   Normal, atraumatic, no cyanosis or edema   Pulses:   2+ and symmetric all extremities   Skin:   Skin color, texture, turgor normal, no rashes or lesions     Cardiographics:      ECG: Personally reviewed by myself shows sinus rhythm, leftward axis, right bundle branch block pattern, no acute changes.      Echocardiogram:     Left ventricle ejection fraction is " normal. The calculated left ventricular ejection fraction is 63%.    Normal right ventricular size and systolic function.    No hemodynamically significant valvular heart abnormalities.    No previous study for comparison.     Lab Results:   Results from last 7 days   Lab Units 01/19/19  0627   LN-WHITE BLOOD CELL COUNT thou/uL 29.4*   LN-HEMOGLOBIN g/dL 9.4*   LN-HEMATOCRIT % 29.2*   LN-PLATELET COUNT thou/uL 454*     Results from last 7 days   Lab Units 01/19/19  0627 01/18/19  0524   LN-SODIUM mmol/L  --  138   LN-POTASSIUM mmol/L 4.4 4.2   LN-CHLORIDE mmol/L  --  103   LN-CO2 mmol/L  --  27   LN-BLOOD UREA NITROGEN mg/dL  --  18   LN-CREATININE mg/dL  --  0.74   LN-CALCIUM mg/dL  --  9.0   .       Lab Results   Component Value Date    TROPONINI 0.02 12/26/2018

## 2021-07-02 NOTE — DISCHARGE SUMMARY
Discharge Summary by Teresa Sebastian PA-C at 1/19/2019 11:11 AM     Author: Teresa Sebastian PA-C Service: Cardiothoracic Surgery Author Type: Physician Assistant    Filed: 1/19/2019 11:18 AM Date of Service: 1/19/2019 11:11 AM Status: Signed    : Teresa Sebastian PA-C (Physician Assistant)       Cardiovascular Surgery Discharge Summary    Primary Care Physician:  Provider, No Primary Care  Discharge Provider: Teresa Sebastian   Admission Date: 1/10/2019  Admission Diagnoses: Angina, class III (H) [I20.9]  Discharge Date: 1/19/2019  Disposition: Home*  Condition at Discharge: Good  Code Status: Full Code     Principal Diagnosis:   Angina, class III (H) s/p CABGx5    Discharge Diagnoses:    Principal Problem:    Angina, class III (H)  Active Problems:      Patient Active Problem List   Diagnosis   ? Angina, class III (H)   ? HTN (hypertension)   ? Unstable angina (H)   ? Hyperlipidemia LDL goal <70   ? S/P CABG (coronary artery bypass graft)   ? Paroxysmal atrial fibrillation (H)   ? Coronary artery disease due to lipid rich plaque   ? SSS (sick sinus syndrome) (H)         Consult/s: Dietary, critical care medicine, cardiology  Surgery:   1.  Epiaortic ultrasound of the ascending aorta.  2.  Quintuple vessel coronary artery bypass grafting procedures; left internal  mammary artery to left anterior descending coronary artery and separate reverse  saphenous vein grafts to the left anterior descending diagonal branch, 2, the obtuse  marginal 2 and a sequential vein graft to the proximal right posterior descending on  to the mid right posterior descending.  3.  Endoscopic vein procurement from the left lower extremity.      Discharge Medications:      Medication List      START taking these medications    clopidogrel 75 mg tablet  Commonly known as:  PLAVIX  Take 1 tablet (75 mg total) by mouth daily.     colchicine 0.6 mg tablet  Take 1 tablet (0.6 mg total) by mouth 2 (two) times a day.     docusate sodium 100 MG  capsule  Commonly known as:  COLACE  Take 1 capsule (100 mg total) by mouth 2 (two) times a day as needed for constipation.     ferrous sulfate 325 (65 FE) MG tablet  Take 1 tablet (325 mg total) by mouth 2 (two) times a day with meals.     furosemide 20 MG tablet  Commonly known as:  LASIX  Take 1 tablet (20 mg total) by mouth 2 (two) times a day at 9am and 6pm. For 7 days     lidocaine 4 % patch  Place 1 patch on the skin daily. Remove and discard patch with 12 hours or as directed by MD.     metoprolol tartrate 25 MG tablet  Commonly known as:  LOPRESSOR  Take 0.5 tablets (12.5 mg total) by mouth 2 (two) times a day.     potassium chloride 20 MEQ tablet  Commonly known as:  K-DUR,KLOR-CON  Take 1 tablet (20 mEq total) by mouth 2 (two) times a day for 7 days. For 7 days     * predniSONE 20 MG tablet  Commonly known as:  DELTASONE  Take 40 mg by mouth daily for 2 days.     * predniSONE 20 MG tablet  Commonly known as:  DELTASONE  Take 20 mg by mouth daily with breakfast for 3 days.     * predniSONE 5 MG tablet  Commonly known as:  DELTASONE  Take 15 mg by mouth daily with breakfast for 3 days.  Start taking on:  1/21/2019     * predniSONE 10 mg tablet  Commonly known as:  DELTASONE  Take 10 mg by mouth daily with breakfast for 3 days.  Start taking on:  1/24/2019     * predniSONE 5 MG tablet  Commonly known as:  DELTASONE  Take 5 mg by mouth daily with breakfast for 3 days.  Start taking on:  1/27/2019     traMADol 50 mg tablet  Commonly known as:  ULTRAM  Take 1 tablet (50 mg total) by mouth every 8 (eight) hours as needed for pain.         * This list has 5 medication(s) that are the same as other medications prescribed for you. Read the directions carefully, and ask your doctor or other care provider to review them with you.            CHANGE how you take these medications    atorvastatin 80 MG tablet  Commonly known as:  LIPITOR  Take 1 tablet (80 mg total) by mouth at bedtime.  What changed:      medication  "strength    how much to take        CONTINUE taking these medications    acetaminophen 325 MG tablet  Commonly known as:  TYLENOL  Take 650 mg by mouth every 6 (six) hours as needed for pain.     aspirin 81 mg chewable tablet  Chew 1 tablet (81 mg total) daily.        STOP taking these medications    carvedilol 12.5 MG tablet  Commonly known as:  COREG            Discharge Instructions:    Follow up appointment with Primary Care Physician: Provider, No Primary Care within 7 days of discharge.  Follow up appointment with Specialist:    Follow with CV Surgery as scheduled.   Follow-up with cardiology as scheduled    Diet: Cardiac    Activity/Restrictions: As tolerated with sternal precautions in mind (see below). No driving for 4 weeks or while on pain medication.     - Shower and wash your incisions daily with soap and water. No tub baths/hot tubs for 4 weeks. An antibacterial soap such as Dial or Safeguard is recommended.    - Check your incisions every day. If you notice any redness, drainage, or anything unusual, please call the surgeons office.    - No driving for 4 weeks after surgery or while on pain medication     - Do not lift anything more than 10 pounds for 6 weeks after surgery. After 6 weeks, advance lifting is tolerated.    - You may have watery drainage from your chest tube site for 2-3 weeks after surgery. Your may cover with a Band-Aid to protect your clothing. Remove the Band-Aid every day and wash the site.    - If you have a leg lesion, you may have swelling for 2-3 months. Elevate your leg any time you are not walking.    - If you feel any \"popping\" or \"clicking\" sensations in your chest, your arms are out too far or you are putting too much weight into arm movements. Do not reach over your head or out to the side to pull something. Do not do any arm exercises or use any exercise equipment that involves arm movement. If you feel your sternum moving, call the surgeon's office.    - Increase your " daily activity as explained by Cardiac Rehab. You are encouraged to enroll in an Outpatient Cardiac Rehab Program.    - No active sports using your upper arms for 3 months. This includes fishing, hunting, bowling, swimming, tennis or golf.    - No physical activity such as cutting the grass, raking, vacuuming, changing sheets on your bed, snow shoveling, or using a  for 3 months.    - Use incentive spirometer 6-8 times per day for 2 weeks.       Hospital Summary:   Main Carney is a 63 y.o. male who was admitted to [facility] on 1/10/2019 following an abnormal coronary angiogram demonstrating severe multi-vessel coronary artery disease. He was referred to CV surgery for evaluation for possible coronary artery revascularization.     Patient was deemed a candidate for coronary artery bypass surgery, and was taken to the operating room on 1/11/2019 where patient underwent five vessel coronary artery bypass and endoscopic vein harvest from the left leg. Surgery was uneventful and patient was brought to the ICU post-operatively. He was extubated on POD#0 and weaned from pressors. Patient was awake and alert, afebrile, and with stable vitals. Insulin drip was discontinued and he was transitioned to a sliding scale. He was transferred to the general telemetry floor. At this point, patient has had return of bowel function, is maintaining oxygen saturations on room air, had his chest tubes removed, and has no complaints of chest pain or shortness of breath. On 01/19/19, patient was stable enough to be discharged to home.    Of note, patient did have gout flare POD#3-4 in his right ankle. He is on colchicine at discharge as well as a steroid taper. His pain is under control at this point. WBC elevation likely due to steroids.     There was also concern for tachy/loren syndrome. He is asymptomatic. Cardiology was consulted and does not feel this warrants pacemaker placement at this time. Will follow.        Vital Signs in last 24 hours:    Temp:  [97.7  F (36.5  C)-98.5  F (36.9  C)] 97.8  F (36.6  C)  Heart Rate:  [60-85] 61  Resp:  [16-20] 16  BP: ()/(56-73) 136/73  SpO2:  [97 %-99 %] 99 %    Physical Exam:    Pertinent exam findings on day of discharge include:  Gen: Seen up in chair. NAD. Pleasant and conversant.   CV: RRR on monitor. No edema.  Pulm: Non-labored breathing on room air.  Abd: Soft, non-tender, non-distended  Neuro: CNs grossly intact  Inc: C/D/I    _______  Teresa Sebastian PA-C  Cardiothoracic Surgery  [phone]

## 2021-07-03 NOTE — H&P (VIEW-ONLY)
H&P (View-Only) by Torsten Herzog MD at 1/3/2019 11:20 AM     Author: Torsten Herzog MD Service: -- Author Type: Physician    Filed: 1/3/2019 12:08 PM Date of Service: 1/3/2019 11:20 AM Status: Signed    : Torsten Herzog MD (Physician)           Click to link to [facility]     [facility] NOTE    Thank you, Dr. Provider, No Primary Care, for asking the [facility] team to see Mr. Main Carney to evaluate Consult chest pain.      Assessment/Recommendations   Assessment:    1. Progressive exertional angina, class IIIb - this is slowly progressive over the course of a year but is fairly limiting in his functional capacity at this time. His symptoms are severe enough and typical enough that proceeding directly to invasive angiography with possible intervention is the best course of action.  2. hypertension - BP elevated today. Not taking any prescription medications for this.    Plan:  1. Continue aspirin  2. Start carvedilol 12.5 mg two times a day  3. Start atorvastatin 40 mg daily  4. Echocardiogram  5. Coronary angiogram with possible PCI. Discussed with the patient the risks and benefits of left heat catheterization with coronary angiogram including possible PCI. The risks include but are not limited to death, myocardial infarction, stroke, kidney dysfunction, vessel trauma, hemorrhage, need for emergency corrective surgery, allergy, and dysrhythmia.  6. Fasting lipid panel morning of angiogram.  7. Follow up with me in 4-6 weeks         History of Present Illness   Mr. Main Carney is a 63 y.o. male with a significant past history of hypertension who presents for urgent evaluation after an ER visit for chest pain.     Mr. Carney is Hmong speaking and presents today with his son and a professional . Mr. Carney has been having exertional dyspnea for about a year. It has been progressive in nature over the past year and now occurs with climbing less than one  flight of stairs. It is now also associated with chest pain and diaphoresis. He has not yet had any rest symptoms but does require several minutes of rest to recover. He works as a  in a school and is able to work at a slow enough pace to avoid significant symptoms.      Other than noted above, Mr. Carney denies any chest pain/pressure/tightness, shortness of breath at rest or with exertion, light headedness/dizziness, pre-syncope, syncope, lower extremity swelling, palpitations, paroxysmal nocturnal dyspnea (PND), or orthopnea.     Cardiac Problems and Cardiac Diagnostics     Most Recent Cardiac testing:  ECG dated 12/31/18 (personaly reviewed and interpreted): sinus rhythm with first degree AV block, RBBB, old inferior infarct. No acute ST/T wave changes.    ECHO (report reviewed): no prior echo.  Echo results:           Medications  Allergies   Current Outpatient Medications   Medication Sig Dispense Refill   ? acetaminophen (TYLENOL) 325 MG tablet Take 650 mg by mouth every 6 (six) hours as needed for pain.     ? aspirin 81 mg chewable tablet Chew 1 tablet (81 mg total) daily. 30 tablet 0   ? atorvastatin (LIPITOR) 40 MG tablet Take 1 tablet (40 mg total) by mouth at bedtime. 30 tablet 5   ? carvedilol (COREG) 12.5 MG tablet Take 1 tablet (12.5 mg total) by mouth 2 (two) times a day with meals. 60 tablet 5     No current facility-administered medications for this visit.       No Known Allergies     Physical Examination Review of Systems   Vitals:    01/03/19 1114   BP: 164/88   Pulse: 64   Resp: 16     Body mass index is 29.49 kg/m .  Wt Readings from Last 3 Encounters:   01/03/19 146 lb (66.2 kg)   12/26/18 145 lb (65.8 kg)       General Appearance:   Pleasant  male, appears stated age. no acute distress, normal body habitus   ENT/Mouth: membranes moist, no apparent gingival bleeding.      EYES:  no scleral icterus, normal conjunctivae   Neck: no carotid bruits. No anterior cervical lymphadenopaty    Respiratory:   lungs are clear to auscultation, no rales or wheezing, equal chest wall expansion    Cardiovascular:   Regular rhythm, normal rate. Normal first and second heart sounds with no murmurs, rubs, or gallops; the carotid, radial and posterior tibial pulses are intact, Jugular venous pressure normal, no edema bilaterally    Abdomen/GI:  no organomegaly, masses, bruits, or tenderness; bowel sounds are present   Extremities: no cyanosis or clubbing   Skin: no xanthelasma, warm.    Heme/lymph/ Immunology No apparent bleeding noted.   Neurologic: Alert and oriented. normal gait, no tremors     Psychiatric: Pleasant, calm, appropriate affect.    A complete 10 system review of systems was performed and is negative except as mentioned in the HPI or below:  General: Fever  Eyes: WNL  Ears/Nose/Throat: WNL  Lungs: Cough, Shortness of Breath, Snoring  Heart: Shortness of Breath with activity  Stomach: WNL  Bladder: WNL  Muscle/Joints: WNL  Skin: WNL  Nervous System: Daytime Sleepiness  Mental Health: WNL     Blood: WNL       Past History   Past Medical History:   Past Medical History:   Diagnosis Date   ? HTN (hypertension)        Past Surgical History:   Past Surgical History:   Procedure Laterality Date   ? APPENDECTOMY         Family History:   Family History   Problem Relation Age of Onset   ? No Medical Problems Mother    ? No Medical Problems Father    ? Heart disease Neg Hx         Social History:   Social History     Socioeconomic History   ? Marital status:      Spouse name: Not on file   ? Number of children: Not on file   ? Years of education: Not on file   ? Highest education level: Not on file   Social Needs   ? Financial resource strain: Not on file   ? Food insecurity - worry: Not on file   ? Food insecurity - inability: Not on file   ? Transportation needs - medical: Not on file   ? Transportation needs - non-medical: Not on file   Occupational History   ? Not on file   Tobacco Use   ?  Smoking status: Never Smoker   ? Smokeless tobacco: Never Used   Substance and Sexual Activity   ? Alcohol use: Not on file   ? Drug use: Not on file   ? Sexual activity: Not on file   Other Topics Concern   ? Not on file   Social History Narrative   ? Not on file              Lab Results    Chemistry/lipid CBC Cardiac Enzymes/BNP/TSH/INR   Lab Results   Component Value Date    CREATININE 0.76 12/26/2018    BUN 15 12/26/2018    K 4.0 12/26/2018     12/26/2018     12/26/2018    CO2 26 12/26/2018    Lab Results   Component Value Date    WBC 10.4 12/26/2018    HGB 14.5 12/26/2018    HCT 41.3 12/26/2018    MCV 91 12/26/2018     12/26/2018    Lab Results   Component Value Date    TROPONINI 0.02 12/26/2018    BNP 73 (H) 12/26/2018          Torsten Herzog MD  Non-invasive Cardiology  [facility]

## 2024-02-20 ENCOUNTER — HOSPITAL ENCOUNTER (EMERGENCY)
Facility: CLINIC | Age: 69
Discharge: HOME | End: 2024-02-21
Attending: EMERGENCY MEDICINE | Admitting: EMERGENCY MEDICINE
Payer: MEDICARE

## 2024-02-20 ENCOUNTER — APPOINTMENT (OUTPATIENT)
Dept: CT IMAGING | Facility: CLINIC | Age: 69
End: 2024-02-20
Attending: EMERGENCY MEDICINE
Payer: MEDICARE

## 2024-02-20 DIAGNOSIS — R51.9 ACUTE NONINTRACTABLE HEADACHE, UNSPECIFIED HEADACHE TYPE: ICD-10-CM

## 2024-02-20 LAB
ANION GAP SERPL CALCULATED.3IONS-SCNC: 9 MMOL/L (ref 7–15)
BASOPHILS # BLD AUTO: 0.1 10E3/UL (ref 0–0.2)
BASOPHILS NFR BLD AUTO: 1 %
BUN SERPL-MCNC: 14 MG/DL (ref 8–23)
CALCIUM SERPL-MCNC: 9.2 MG/DL (ref 8.8–10.2)
CHLORIDE SERPL-SCNC: 99 MMOL/L (ref 98–107)
CREAT SERPL-MCNC: 0.8 MG/DL (ref 0.67–1.17)
CRP SERPL-MCNC: <3 MG/L
DEPRECATED HCO3 PLAS-SCNC: 27 MMOL/L (ref 22–29)
EGFRCR SERPLBLD CKD-EPI 2021: >90 ML/MIN/1.73M2
EOSINOPHIL # BLD AUTO: 0.2 10E3/UL (ref 0–0.7)
EOSINOPHIL NFR BLD AUTO: 2 %
ERYTHROCYTE [DISTWIDTH] IN BLOOD BY AUTOMATED COUNT: 11.5 % (ref 10–15)
ERYTHROCYTE [SEDIMENTATION RATE] IN BLOOD BY WESTERGREN METHOD: 15 MM/HR (ref 0–20)
GLUCOSE SERPL-MCNC: 104 MG/DL (ref 70–99)
HCT VFR BLD AUTO: 40.2 % (ref 40–53)
HGB BLD-MCNC: 14 G/DL (ref 13.3–17.7)
IMM GRANULOCYTES # BLD: 0 10E3/UL
IMM GRANULOCYTES NFR BLD: 0 %
LYMPHOCYTES # BLD AUTO: 2.6 10E3/UL (ref 0.8–5.3)
LYMPHOCYTES NFR BLD AUTO: 26 %
MCH RBC QN AUTO: 32.3 PG (ref 26.5–33)
MCHC RBC AUTO-ENTMCNC: 34.8 G/DL (ref 31.5–36.5)
MCV RBC AUTO: 93 FL (ref 78–100)
MONOCYTES # BLD AUTO: 0.7 10E3/UL (ref 0–1.3)
MONOCYTES NFR BLD AUTO: 7 %
NEUTROPHILS # BLD AUTO: 6.3 10E3/UL (ref 1.6–8.3)
NEUTROPHILS NFR BLD AUTO: 64 %
NRBC # BLD AUTO: 0 10E3/UL
NRBC BLD AUTO-RTO: 0 /100
PLATELET # BLD AUTO: 230 10E3/UL (ref 150–450)
POTASSIUM SERPL-SCNC: 4 MMOL/L (ref 3.4–5.3)
RBC # BLD AUTO: 4.34 10E6/UL (ref 4.4–5.9)
SODIUM SERPL-SCNC: 135 MMOL/L (ref 135–145)
WBC # BLD AUTO: 9.9 10E3/UL (ref 4–11)

## 2024-02-20 PROCEDURE — 96374 THER/PROPH/DIAG INJ IV PUSH: CPT | Performed by: EMERGENCY MEDICINE

## 2024-02-20 PROCEDURE — 36415 COLL VENOUS BLD VENIPUNCTURE: CPT | Performed by: EMERGENCY MEDICINE

## 2024-02-20 PROCEDURE — 70450 CT HEAD/BRAIN W/O DYE: CPT | Mod: MG

## 2024-02-20 PROCEDURE — 85652 RBC SED RATE AUTOMATED: CPT | Performed by: EMERGENCY MEDICINE

## 2024-02-20 PROCEDURE — 99285 EMERGENCY DEPT VISIT HI MDM: CPT | Mod: 25 | Performed by: EMERGENCY MEDICINE

## 2024-02-20 PROCEDURE — 86140 C-REACTIVE PROTEIN: CPT | Performed by: EMERGENCY MEDICINE

## 2024-02-20 PROCEDURE — 80048 BASIC METABOLIC PNL TOTAL CA: CPT | Performed by: EMERGENCY MEDICINE

## 2024-02-20 PROCEDURE — 85025 COMPLETE CBC W/AUTO DIFF WBC: CPT | Performed by: EMERGENCY MEDICINE

## 2024-02-20 PROCEDURE — 99284 EMERGENCY DEPT VISIT MOD MDM: CPT | Performed by: EMERGENCY MEDICINE

## 2024-02-20 PROCEDURE — 250N000011 HC RX IP 250 OP 636: Performed by: EMERGENCY MEDICINE

## 2024-02-20 RX ORDER — ACETAMINOPHEN 500 MG
1000 TABLET ORAL ONCE
Status: COMPLETED | OUTPATIENT
Start: 2024-02-20 | End: 2024-02-21

## 2024-02-20 RX ORDER — OLANZAPINE 10 MG/1
1.25 INJECTION, POWDER, LYOPHILIZED, FOR SOLUTION INTRAMUSCULAR ONCE
Status: COMPLETED | OUTPATIENT
Start: 2024-02-20 | End: 2024-02-20

## 2024-02-20 RX ORDER — OLANZAPINE 10 MG/1
1.25 INJECTION, POWDER, LYOPHILIZED, FOR SOLUTION INTRAMUSCULAR ONCE
Status: COMPLETED | OUTPATIENT
Start: 2024-02-20 | End: 2024-02-21

## 2024-02-20 RX ADMIN — OLANZAPINE 1.25 MG: 10 INJECTION, POWDER, FOR SOLUTION INTRAMUSCULAR at 22:38

## 2024-02-20 ASSESSMENT — ACTIVITIES OF DAILY LIVING (ADL)
ADLS_ACUITY_SCORE: 33
ADLS_ACUITY_SCORE: 35

## 2024-02-21 VITALS
HEART RATE: 60 BPM | DIASTOLIC BLOOD PRESSURE: 82 MMHG | SYSTOLIC BLOOD PRESSURE: 164 MMHG | TEMPERATURE: 97.2 F | WEIGHT: 140 LBS | OXYGEN SATURATION: 99 % | RESPIRATION RATE: 15 BRPM | BODY MASS INDEX: 29.26 KG/M2

## 2024-02-21 PROCEDURE — 250N000013 HC RX MED GY IP 250 OP 250 PS 637: Performed by: EMERGENCY MEDICINE

## 2024-02-21 PROCEDURE — 96376 TX/PRO/DX INJ SAME DRUG ADON: CPT | Performed by: EMERGENCY MEDICINE

## 2024-02-21 PROCEDURE — 250N000011 HC RX IP 250 OP 636: Performed by: EMERGENCY MEDICINE

## 2024-02-21 RX ADMIN — ACETAMINOPHEN 1000 MG: 500 TABLET, FILM COATED ORAL at 00:12

## 2024-02-21 RX ADMIN — OLANZAPINE 1.25 MG: 10 INJECTION, POWDER, FOR SOLUTION INTRAMUSCULAR at 00:12

## 2024-02-21 ASSESSMENT — ACTIVITIES OF DAILY LIVING (ADL): ADLS_ACUITY_SCORE: 35

## 2024-02-21 NOTE — ED TRIAGE NOTES
HA, and blurred vision since 2/19 at 1400. Pt denied N/T, dizziness, weakness. Per son speech intact.  Neuro exam negative. Pt is on blood thinners. Hx of open heart surgery. Writer used int. Services.      Triage Assessment (Adult)       Row Name 02/20/24 2021          Triage Assessment    Airway WDL WDL        Respiratory WDL    Respiratory WDL WDL        Skin Circulation/Temperature WDL    Skin Circulation/Temperature WDL WDL        Cardiac WDL    Cardiac WDL WDL        Peripheral/Neurovascular WDL    Peripheral Neurovascular WDL WDL        Cognitive/Neuro/Behavioral WDL    Cognitive/Neuro/Behavioral WDL WDL

## 2024-02-21 NOTE — DISCHARGE INSTRUCTIONS
I am not sure what caused your headache but the testing has been reassuring so far.  Drink plenty of fluids, continue your home medications, return if worse.  Follow-up in clinic for recheck in the next 1 to 2 weeks.

## 2024-02-21 NOTE — ED PROVIDER NOTES
History     Chief Complaint   Patient presents with    Eye Problem    Headache     HPI  Main Carney is a 68 year old male who presents for headache and vision changes. This encounter was obtained with the assistance of a [facility] .  Symptoms started yesterday, headache started initially, he describes it as difficulty focusing and blurred vision.  Affects both eyes equally he says but he feels as if his vision is normal when he closes 1 eye or the other.  He does say that he feels like his right eye is worse than his left eye.  Denies dizziness but he says he feels like he is slightly unsteady when he gets up and walks around.  His son has not noticed any change in his gait, has been given walking around without difficulty per the son.  The patient denies double vision.  No nausea, vomiting, or focal numbness or weakness.  No chest pain or difficulty breathing or fevers.  No recent falls.  The patient has a history of coronary artery disease status post coronary bypass surgery and is on clopidogrel.    Allergies:  No Known Allergies    Problem List:    Patient Active Problem List    Diagnosis Date Noted    Hyperlipidemia LDL goal <70 01/23/2019     Priority: Medium    S/P CABG (coronary artery bypass graft) 01/23/2019     Priority: Medium    HTN (hypertension) 01/03/2019     Priority: Medium        Past Medical History:    Past Medical History:   Diagnosis Date    Hypertension        Past Surgical History:    Past Surgical History:   Procedure Laterality Date    APPENDECTOMY      APPENDECTOMY      CARDIAC SURGERY      bypass    CV CORONARY ANGIOGRAM N/A 1/10/2019    Procedure: Coronary Angiogram;  Surgeon: Glory Carl MD;  Location: [facility] Cath Lab;  Service: Cardiology    CV LEFT HEART CATHETERIZATION WITHOUT LEFT VENTRICULOGRAM Left 1/10/2019    Procedure: Left Heart Catheterization Without Left Ventriculogram;  Surgeon: Glory Carl MD;  Location: [facility] Cath Lab;  Service: Cardiology     OTHER SURGICAL HISTORY  01/12/2019    coronary artery bypass 4 vessle     PICC AND MIDLINE TEAM LINE INSERTION  1/12/2019            Family History:    Family History   Problem Relation Age of Onset    No Known Problems Mother     No Known Problems Father     Heart Disease No family hx of        Social History:  Marital Status:   [2]  Social History     Tobacco Use    Smoking status: Never    Smokeless tobacco: Never   Substance Use Topics    Alcohol use: Yes    Drug use: Yes        Medications:    acetaminophen (TYLENOL) 325 MG tablet  aspirin (ASA) 81 MG chewable tablet  atorvastatin (LIPITOR) 80 MG tablet  carboxymethylcellulose (REFRESH PLUS) 0.5 % SOLN ophthalmic solution  clopidogrel (PLAVIX) 75 MG tablet  colchicine (COLCYRS) 0.6 MG tablet  ferrous sulfate (FEROSUL) 325 (65 Fe) MG tablet  lisinopril (PRINIVIL/ZESTRIL) 10 MG tablet  metoprolol tartrate (LOPRESSOR) 25 MG tablet  traMADol (ULTRAM) 50 MG tablet          Review of Systems    Physical Exam   BP: (!) 198/90  Pulse: 65  Temp: 97.2  F (36.2  C)  Resp: 18  Weight: 63.5 kg (140 lb)  SpO2: 99 %      Physical Exam  Vitals and nursing note reviewed.   Constitutional:       Appearance: He is well-developed. He is not diaphoretic.   HENT:      Head: Normocephalic and atraumatic.      Right Ear: External ear normal.      Left Ear: External ear normal.      Nose: Nose normal.   Eyes:      General: No scleral icterus.     Conjunctiva/sclera: Conjunctivae normal.   Cardiovascular:      Rate and Rhythm: Normal rate and regular rhythm.   Pulmonary:      Effort: Pulmonary effort is normal. No respiratory distress.      Breath sounds: No stridor.   Abdominal:      General: There is no distension.      Palpations: Abdomen is soft.   Musculoskeletal:      Cervical back: Normal range of motion.   Skin:     General: Skin is warm and dry.   Neurological:      Mental Status: He is alert.      GCS: GCS eye subscore is 4. GCS verbal subscore is 5. GCS motor  subscore is 6.      Cranial Nerves: Cranial nerves 2-12 are intact.      Motor: No abnormal muscle tone or pronator drift.      Coordination: Finger-Nose-Finger Test normal. Rapid alternating movements normal.      Gait: Gait normal.   Psychiatric:         Behavior: Behavior normal.         ED Course                 Procedures              Critical Care time:  none               Results for orders placed or performed during the hospital encounter of 02/20/24 (from the past 24 hour(s))   Basic metabolic panel   Result Value Ref Range    Sodium 135 135 - 145 mmol/L    Potassium 4.0 3.4 - 5.3 mmol/L    Chloride 99 98 - 107 mmol/L    Carbon Dioxide (CO2) 27 22 - 29 mmol/L    Anion Gap 9 7 - 15 mmol/L    Urea Nitrogen 14.0 8.0 - 23.0 mg/dL    Creatinine 0.80 0.67 - 1.17 mg/dL    GFR Estimate >90 >60 mL/min/1.73m2    Calcium 9.2 8.8 - 10.2 mg/dL    Glucose 104 (H) 70 - 99 mg/dL   CBC with Platelets & Differential    Narrative    The following orders were created for panel order CBC with Platelets & Differential.  Procedure                               Abnormality         Status                     ---------                               -----------         ------                     CBC with platelets and d...[[id number]]  Abnormal            Final result                 Please view results for these tests on the individual orders.   Erythrocyte sedimentation rate auto   Result Value Ref Range    Erythrocyte Sedimentation Rate 15 0 - 20 mm/hr   CRP Inflammation   Result Value Ref Range    CRP Inflammation <3.00 <5.00 mg/L   CBC with platelets and differential   Result Value Ref Range    WBC Count 9.9 4.0 - 11.0 10e3/uL    RBC Count 4.34 (L) 4.40 - 5.90 10e6/uL    Hemoglobin 14.0 13.3 - 17.7 g/dL    Hematocrit 40.2 40.0 - 53.0 %    MCV 93 78 - 100 fL    MCH 32.3 26.5 - 33.0 pg    MCHC 34.8 31.5 - 36.5 g/dL    RDW 11.5 10.0 - 15.0 %    Platelet Count 230 150 - 450 10e3/uL    % Neutrophils 64 %    % Lymphocytes 26 %    %  Monocytes 7 %    % Eosinophils 2 %    % Basophils 1 %    % Immature Granulocytes 0 %    NRBCs per 100 WBC 0 <1 /100    Absolute Neutrophils 6.3 1.6 - 8.3 10e3/uL    Absolute Lymphocytes 2.6 0.8 - 5.3 10e3/uL    Absolute Monocytes 0.7 0.0 - 1.3 10e3/uL    Absolute Eosinophils 0.2 0.0 - 0.7 10e3/uL    Absolute Basophils 0.1 0.0 - 0.2 10e3/uL    Absolute Immature Granulocytes 0.0 <=0.4 10e3/uL    Absolute NRBCs 0.0 10e3/uL   CT Head w/o Contrast    Narrative    EXAM: CT HEAD W/O CONTRAST  LOCATION: [facility]  DATE: 2/20/2024    INDICATION: headache, light sensitivity  COMPARISON: None.  TECHNIQUE: Routine CT Head without IV contrast. Multiplanar reformats. Dose reduction techniques were used.    FINDINGS:  INTRACRANIAL CONTENTS: No intracranial hemorrhage, extraaxial collection, or mass effect.  Moderately sized chronic encephalomalacia right frontoparietal region Moderate presumed chronic small vessel ischemic changes. Mild generalized volume loss. No   hydrocephalus.     VISUALIZED ORBITS/SINUSES/MASTOIDS: No intraorbital abnormality. No paranasal sinus mucosal disease. No middle ear or mastoid effusion.    BONES/SOFT TISSUES: No acute abnormality.      Impression    IMPRESSION:  1.  No acute intracranial process.       Medications   OLANZapine (zyPREXA) IV injection 1.25 mg (1.25 mg Intravenous $Given 2/20/24 2238)   acetaminophen (TYLENOL) tablet 1,000 mg (1,000 mg Oral $Given 2/21/24 0012)   OLANZapine (zyPREXA) IV injection 1.25 mg (1.25 mg Intravenous $Given 2/21/24 0012)       Assessments & Plan (with Medical Decision Making)   68-year-old male presents with headache and vision changes.  He has a normal neurologic examination here.  Reassuring vital signs.  CT of the head obtained, images interpreted independently as well as radiology read reviewed, no signs of intracranial hemorrhage or mass.  Electrolytes are within normal limits.  ESR is normal and CRP is normal making giant cell  arteritis very unlikely.  White blood cell count is 9.9 and hemoglobin is 14, no signs of anemia.  He was given IV olanzapine and oral acetaminophen for symptoms with improvement.  He said his vision seemed back to normal now.  He felt comfortable going home and I think this is reasonable to discharge the patient to home with instructions to return if worse, otherwise follow-up in clinic.  The patient is in agreement with this plan.    I have reviewed the nursing notes.    I have reviewed the findings, diagnosis, plan and need for follow up with the patient.           Discharge Medication List as of 2/21/2024  1:14 AM          Final diagnoses:   Acute nonintractable headache, unspecified headache type       2/20/2024   [facility] EMERGENCY DEPT       Phillip Sheth MD  02/21/24 [id number]

## 2024-03-07 ENCOUNTER — TRANSCRIBE ORDERS (OUTPATIENT)
Dept: OTHER | Age: 69
End: 2024-03-07

## 2024-03-07 DIAGNOSIS — H49.01 THIRD NERVE PALSY OF RIGHT EYE: Primary | ICD-10-CM
